# Patient Record
Sex: MALE | Employment: OTHER | URBAN - METROPOLITAN AREA
[De-identification: names, ages, dates, MRNs, and addresses within clinical notes are randomized per-mention and may not be internally consistent; named-entity substitution may affect disease eponyms.]

---

## 2020-01-01 ENCOUNTER — PATIENT OUTREACH (OUTPATIENT)
Dept: HEALTH INFORMATION MANAGEMENT | Facility: OTHER | Age: 75
End: 2020-01-01

## 2020-01-01 ENCOUNTER — APPOINTMENT (OUTPATIENT)
Dept: RADIOLOGY | Facility: MEDICAL CENTER | Age: 75
DRG: 246 | End: 2020-01-01
Attending: INTERNAL MEDICINE
Payer: MEDICARE

## 2020-01-01 ENCOUNTER — APPOINTMENT (OUTPATIENT)
Dept: RADIOLOGY | Facility: MEDICAL CENTER | Age: 75
DRG: 246 | End: 2020-01-01
Attending: STUDENT IN AN ORGANIZED HEALTH CARE EDUCATION/TRAINING PROGRAM
Payer: MEDICARE

## 2020-01-01 ENCOUNTER — APPOINTMENT (OUTPATIENT)
Dept: CARDIOLOGY | Facility: MEDICAL CENTER | Age: 75
DRG: 246 | End: 2020-01-01
Attending: INTERNAL MEDICINE
Payer: MEDICARE

## 2020-01-01 ENCOUNTER — APPOINTMENT (OUTPATIENT)
Dept: RADIOLOGY | Facility: MEDICAL CENTER | Age: 75
DRG: 246 | End: 2020-01-01
Attending: EMERGENCY MEDICINE
Payer: MEDICARE

## 2020-01-01 ENCOUNTER — APPOINTMENT (OUTPATIENT)
Dept: CARDIOLOGY | Facility: MEDICAL CENTER | Age: 75
DRG: 246 | End: 2020-01-01
Attending: EMERGENCY MEDICINE
Payer: MEDICARE

## 2020-01-01 ENCOUNTER — HOSPITAL ENCOUNTER (INPATIENT)
Facility: MEDICAL CENTER | Age: 75
LOS: 11 days | DRG: 246 | End: 2020-08-13
Attending: EMERGENCY MEDICINE | Admitting: INTERNAL MEDICINE
Payer: MEDICARE

## 2020-01-01 ENCOUNTER — HOSPITAL ENCOUNTER (OUTPATIENT)
Facility: MEDICAL CENTER | Age: 75
End: 2020-08-02
Payer: MEDICARE

## 2020-01-01 VITALS
TEMPERATURE: 99.3 F | RESPIRATION RATE: 35 BRPM | WEIGHT: 161.16 LBS | BODY MASS INDEX: 27.51 KG/M2 | OXYGEN SATURATION: 83 % | SYSTOLIC BLOOD PRESSURE: 74 MMHG | HEIGHT: 64 IN | DIASTOLIC BLOOD PRESSURE: 54 MMHG | HEART RATE: 35 BPM

## 2020-01-01 DIAGNOSIS — I21.3 ST ELEVATION MYOCARDIAL INFARCTION (STEMI), UNSPECIFIED ARTERY (HCC): ICD-10-CM

## 2020-01-01 DIAGNOSIS — R41.82 ALTERED MENTAL STATUS, UNSPECIFIED ALTERED MENTAL STATUS TYPE: ICD-10-CM

## 2020-01-01 DIAGNOSIS — I49.01 VENTRICULAR FIBRILLATION (HCC): ICD-10-CM

## 2020-01-01 DIAGNOSIS — I46.9 CARDIAC ARREST (HCC): ICD-10-CM

## 2020-01-01 LAB
ABO + RH BLD: NORMAL
ABO GROUP BLD: ABNORMAL
ACT BLD: 202 SEC (ref 74–137)
ACT BLD: 241 SEC (ref 74–137)
ACT BLD: 268 SEC (ref 74–137)
ACTION RANGE TRIGGERED IACRT: NO
ACTION RANGE TRIGGERED IACRT: YES
ALBUMIN SERPL BCP-MCNC: 2.3 G/DL (ref 3.2–4.9)
ALBUMIN SERPL BCP-MCNC: 2.6 G/DL (ref 3.2–4.9)
ALBUMIN/GLOB SERPL: 0.7 G/DL
ALBUMIN/GLOB SERPL: 1.1 G/DL
ALP SERPL-CCNC: 64 U/L (ref 30–99)
ALP SERPL-CCNC: 72 U/L (ref 30–99)
ALT SERPL-CCNC: 48 U/L (ref 2–50)
ALT SERPL-CCNC: 55 U/L (ref 2–50)
AMPHET UR QL SCN: NEGATIVE
ANION GAP SERPL CALC-SCNC: 10 MMOL/L (ref 7–16)
ANION GAP SERPL CALC-SCNC: 11 MMOL/L (ref 7–16)
ANION GAP SERPL CALC-SCNC: 13 MMOL/L (ref 7–16)
ANION GAP SERPL CALC-SCNC: 14 MMOL/L (ref 7–16)
ANION GAP SERPL CALC-SCNC: 14 MMOL/L (ref 7–16)
ANION GAP SERPL CALC-SCNC: 15 MMOL/L (ref 7–16)
ANION GAP SERPL CALC-SCNC: 17 MMOL/L (ref 7–16)
ANION GAP SERPL CALC-SCNC: 19 MMOL/L (ref 7–16)
ANION GAP SERPL CALC-SCNC: 6 MMOL/L (ref 7–16)
ANISOCYTOSIS BLD QL SMEAR: ABNORMAL
APPEARANCE FLD: NORMAL
APPEARANCE UR: CLEAR
APTT PPP: 30.5 SEC (ref 24.7–36)
AST SERPL-CCNC: 192 U/L (ref 12–45)
AST SERPL-CCNC: 53 U/L (ref 12–45)
BACTERIA #/AREA URNS HPF: NEGATIVE /HPF
BACTERIA SPEC RESP CULT: NORMAL
BACTERIA SPEC RESP CULT: NORMAL
BARBITURATES UR QL SCN: NEGATIVE
BARCODED ABORH UBTYP: 600
BARCODED ABORH UBTYP: 600
BARCODED PRD CODE UBPRD: ABNORMAL
BARCODED PRD CODE UBPRD: ABNORMAL
BARCODED UNIT NUM UBUNT: ABNORMAL
BARCODED UNIT NUM UBUNT: ABNORMAL
BASE EXCESS BLDA CALC-SCNC: -10 MMOL/L (ref -4–3)
BASE EXCESS BLDA CALC-SCNC: -10 MMOL/L (ref -4–3)
BASE EXCESS BLDA CALC-SCNC: -4 MMOL/L (ref -4–3)
BASE EXCESS BLDA CALC-SCNC: -7 MMOL/L (ref -4–3)
BASE EXCESS BLDA CALC-SCNC: 3 MMOL/L (ref -4–3)
BASE EXCESS BLDA CALC-SCNC: 5 MMOL/L (ref -4–3)
BASE EXCESS BLDA CALC-SCNC: 9 MMOL/L (ref -4–3)
BASE EXCESS BLDV CALC-SCNC: 11 MMOL/L (ref -4–3)
BASE EXCESS BLDV CALC-SCNC: 13 MMOL/L (ref -4–3)
BASE EXCESS BLDV CALC-SCNC: 7 MMOL/L (ref -4–3)
BASOPHILS # BLD AUTO: 0 % (ref 0–1.8)
BASOPHILS # BLD AUTO: 0.1 % (ref 0–1.8)
BASOPHILS # BLD AUTO: 0.2 % (ref 0–1.8)
BASOPHILS # BLD AUTO: 0.2 % (ref 0–1.8)
BASOPHILS # BLD AUTO: 0.3 % (ref 0–1.8)
BASOPHILS # BLD AUTO: 0.4 % (ref 0–1.8)
BASOPHILS # BLD AUTO: 0.4 % (ref 0–1.8)
BASOPHILS # BLD AUTO: 0.5 % (ref 0–1.8)
BASOPHILS # BLD: 0 K/UL (ref 0–0.12)
BASOPHILS # BLD: 0.03 K/UL (ref 0–0.12)
BASOPHILS # BLD: 0.03 K/UL (ref 0–0.12)
BASOPHILS # BLD: 0.04 K/UL (ref 0–0.12)
BASOPHILS # BLD: 0.05 K/UL (ref 0–0.12)
BASOPHILS # BLD: 0.06 K/UL (ref 0–0.12)
BASOPHILS # BLD: 0.08 K/UL (ref 0–0.12)
BASOPHILS # BLD: 0.08 K/UL (ref 0–0.12)
BASOPHILS # BLD: 0.09 K/UL (ref 0–0.12)
BASOPHILS # BLD: 0.11 K/UL (ref 0–0.12)
BENZODIAZ UR QL SCN: POSITIVE
BILIRUB SERPL-MCNC: 0.6 MG/DL (ref 0.1–1.5)
BILIRUB SERPL-MCNC: 0.6 MG/DL (ref 0.1–1.5)
BILIRUB UR QL STRIP.AUTO: NEGATIVE
BLD GP AB SCN SERPL QL: ABNORMAL
BODY FLD TYPE: NORMAL
BODY TEMPERATURE: ABNORMAL DEGREES
BUN SERPL-MCNC: 24 MG/DL (ref 8–22)
BUN SERPL-MCNC: 25 MG/DL (ref 8–22)
BUN SERPL-MCNC: 26 MG/DL (ref 8–22)
BUN SERPL-MCNC: 29 MG/DL (ref 8–22)
BUN SERPL-MCNC: 29 MG/DL (ref 8–22)
BUN SERPL-MCNC: 32 MG/DL (ref 8–22)
BUN SERPL-MCNC: 38 MG/DL (ref 8–22)
BUN SERPL-MCNC: 40 MG/DL (ref 8–22)
BUN SERPL-MCNC: 41 MG/DL (ref 8–22)
BUN SERPL-MCNC: 47 MG/DL (ref 8–22)
BUN SERPL-MCNC: 48 MG/DL (ref 8–22)
BUN SERPL-MCNC: 50 MG/DL (ref 8–22)
BUN SERPL-MCNC: 52 MG/DL (ref 8–22)
BZE UR QL SCN: NEGATIVE
CA-I BLD ISE-SCNC: 1.14 MMOL/L (ref 1.1–1.3)
CA-I BLD ISE-SCNC: 1.17 MMOL/L (ref 1.1–1.3)
CA-I SERPL-SCNC: 1.1 MMOL/L (ref 1.1–1.3)
CA-I SERPL-SCNC: 1.1 MMOL/L (ref 1.1–1.3)
CALCIUM SERPL-MCNC: 6.9 MG/DL (ref 8.5–10.5)
CALCIUM SERPL-MCNC: 7.1 MG/DL (ref 8.5–10.5)
CALCIUM SERPL-MCNC: 7.5 MG/DL (ref 8.5–10.5)
CALCIUM SERPL-MCNC: 7.7 MG/DL (ref 8.5–10.5)
CALCIUM SERPL-MCNC: 7.8 MG/DL (ref 8.5–10.5)
CALCIUM SERPL-MCNC: 7.8 MG/DL (ref 8.5–10.5)
CALCIUM SERPL-MCNC: 8.1 MG/DL (ref 8.5–10.5)
CALCIUM SERPL-MCNC: 8.2 MG/DL (ref 8.5–10.5)
CALCIUM SERPL-MCNC: 8.2 MG/DL (ref 8.5–10.5)
CALCIUM SERPL-MCNC: 8.3 MG/DL (ref 8.5–10.5)
CALCIUM SERPL-MCNC: 8.3 MG/DL (ref 8.5–10.5)
CALCIUM SERPL-MCNC: 8.5 MG/DL (ref 8.5–10.5)
CANNABINOIDS UR QL SCN: NEGATIVE
CFT BLD TEG: 4.7 MIN (ref 5–10)
CFT P HPASE BLD TEG: 4.2 MIN (ref 5–10)
CHLORIDE SERPL-SCNC: 101 MMOL/L (ref 96–112)
CHLORIDE SERPL-SCNC: 102 MMOL/L (ref 96–112)
CHLORIDE SERPL-SCNC: 103 MMOL/L (ref 96–112)
CHLORIDE SERPL-SCNC: 103 MMOL/L (ref 96–112)
CHLORIDE SERPL-SCNC: 104 MMOL/L (ref 96–112)
CHLORIDE SERPL-SCNC: 104 MMOL/L (ref 96–112)
CHLORIDE SERPL-SCNC: 106 MMOL/L (ref 96–112)
CHLORIDE SERPL-SCNC: 106 MMOL/L (ref 96–112)
CHLORIDE SERPL-SCNC: 107 MMOL/L (ref 96–112)
CHLORIDE SERPL-SCNC: 108 MMOL/L (ref 96–112)
CHLORIDE SERPL-SCNC: 113 MMOL/L (ref 96–112)
CHLORIDE SERPL-SCNC: 113 MMOL/L (ref 96–112)
CHLORIDE SERPL-SCNC: 115 MMOL/L (ref 96–112)
CHLORIDE SERPL-SCNC: 116 MMOL/L (ref 96–112)
CHLORIDE SERPL-SCNC: 99 MMOL/L (ref 96–112)
CHOLEST SERPL-MCNC: 91 MG/DL (ref 100–199)
CLOT ANGLE BLD TEG: 65.3 DEGREES (ref 53–72)
CLOT ANGLE P HPASE BLD TEG: 67.6 DEGREES (ref 53–72)
CLOT INIT P HPASE BLD TEG: 1.6 MIN (ref 1–3)
CLOT LYSIS 30M P MA LENFR BLD TEG: 0 % (ref 0–8)
CLOT LYSIS 30M P MA LENFR BLD TEG: 0 % (ref 0–8)
CO2 BLDA-SCNC: 18 MMOL/L (ref 20–33)
CO2 BLDA-SCNC: 20 MMOL/L (ref 20–33)
CO2 BLDA-SCNC: 23 MMOL/L (ref 20–33)
CO2 BLDA-SCNC: 24 MMOL/L (ref 20–33)
CO2 BLDA-SCNC: 28 MMOL/L (ref 20–33)
CO2 BLDA-SCNC: 31 MMOL/L (ref 20–33)
CO2 BLDA-SCNC: 33 MMOL/L (ref 20–33)
CO2 BLDV-SCNC: 35 MMOL/L (ref 20–33)
CO2 BLDV-SCNC: 37 MMOL/L (ref 20–33)
CO2 BLDV-SCNC: 38 MMOL/L (ref 20–33)
CO2 SERPL-SCNC: 17 MMOL/L (ref 20–33)
CO2 SERPL-SCNC: 18 MMOL/L (ref 20–33)
CO2 SERPL-SCNC: 18 MMOL/L (ref 20–33)
CO2 SERPL-SCNC: 22 MMOL/L (ref 20–33)
CO2 SERPL-SCNC: 24 MMOL/L (ref 20–33)
CO2 SERPL-SCNC: 29 MMOL/L (ref 20–33)
CO2 SERPL-SCNC: 30 MMOL/L (ref 20–33)
CO2 SERPL-SCNC: 33 MMOL/L (ref 20–33)
CO2 SERPL-SCNC: 34 MMOL/L (ref 20–33)
CO2 SERPL-SCNC: 34 MMOL/L (ref 20–33)
COLOR FLD: NORMAL
COLOR UR: YELLOW
COMPONENT R 8504R: ABNORMAL
COMPONENT R 8504R: ABNORMAL
CORTIS SERPL-MCNC: 16.4 UG/DL (ref 0–23)
CREAT SERPL-MCNC: 0.91 MG/DL (ref 0.5–1.4)
CREAT SERPL-MCNC: 1 MG/DL (ref 0.5–1.4)
CREAT SERPL-MCNC: 1.02 MG/DL (ref 0.5–1.4)
CREAT SERPL-MCNC: 1.09 MG/DL (ref 0.5–1.4)
CREAT SERPL-MCNC: 1.14 MG/DL (ref 0.5–1.4)
CREAT SERPL-MCNC: 1.14 MG/DL (ref 0.5–1.4)
CREAT SERPL-MCNC: 1.16 MG/DL (ref 0.5–1.4)
CREAT SERPL-MCNC: 1.17 MG/DL (ref 0.5–1.4)
CREAT SERPL-MCNC: 1.18 MG/DL (ref 0.5–1.4)
CREAT SERPL-MCNC: 1.19 MG/DL (ref 0.5–1.4)
CREAT SERPL-MCNC: 1.2 MG/DL (ref 0.5–1.4)
CREAT SERPL-MCNC: 1.21 MG/DL (ref 0.5–1.4)
CREAT SERPL-MCNC: 1.23 MG/DL (ref 0.5–1.4)
CREAT SERPL-MCNC: 1.26 MG/DL (ref 0.5–1.4)
CREAT SERPL-MCNC: 1.35 MG/DL (ref 0.5–1.4)
CRP SERPL HS-MCNC: 16.17 MG/DL (ref 0–0.75)
CRP SERPL HS-MCNC: 6.9 MG/DL (ref 0–0.75)
CRP SERPL HS-MCNC: 8.71 MG/DL (ref 0–0.75)
CSF COMMENTS 1658: NORMAL
CT.EXTRINSIC BLD ROTEM: 1.8 MIN (ref 1–3)
CYTOLOGY REG CYTOL: NORMAL
EKG IMPRESSION: NORMAL
EOSINOPHIL # BLD AUTO: 0 K/UL (ref 0–0.51)
EOSINOPHIL # BLD AUTO: 0.01 K/UL (ref 0–0.51)
EOSINOPHIL # BLD AUTO: 0.01 K/UL (ref 0–0.51)
EOSINOPHIL # BLD AUTO: 0.02 K/UL (ref 0–0.51)
EOSINOPHIL # BLD AUTO: 0.04 K/UL (ref 0–0.51)
EOSINOPHIL # BLD AUTO: 0.05 K/UL (ref 0–0.51)
EOSINOPHIL # BLD AUTO: 0.05 K/UL (ref 0–0.51)
EOSINOPHIL # BLD AUTO: 0.08 K/UL (ref 0–0.51)
EOSINOPHIL # BLD AUTO: 0.09 K/UL (ref 0–0.51)
EOSINOPHIL # BLD AUTO: 0.09 K/UL (ref 0–0.51)
EOSINOPHIL # BLD AUTO: 0.13 K/UL (ref 0–0.51)
EOSINOPHIL # BLD AUTO: 0.53 K/UL (ref 0–0.51)
EOSINOPHIL NFR BLD: 0 % (ref 0–6.9)
EOSINOPHIL NFR BLD: 0 % (ref 0–6.9)
EOSINOPHIL NFR BLD: 0.1 % (ref 0–6.9)
EOSINOPHIL NFR BLD: 0.1 % (ref 0–6.9)
EOSINOPHIL NFR BLD: 0.2 % (ref 0–6.9)
EOSINOPHIL NFR BLD: 0.3 % (ref 0–6.9)
EOSINOPHIL NFR BLD: 0.3 % (ref 0–6.9)
EOSINOPHIL NFR BLD: 0.4 % (ref 0–6.9)
EOSINOPHIL NFR BLD: 0.6 % (ref 0–6.9)
EOSINOPHIL NFR BLD: 0.7 % (ref 0–6.9)
EOSINOPHIL NFR BLD: 1.1 % (ref 0–6.9)
EOSINOPHIL NFR BLD: 1.7 % (ref 0–6.9)
EOSINOPHIL NFR FLD: 1 %
EPI CELLS #/AREA URNS HPF: NEGATIVE /HPF
ERYTHROCYTE [DISTWIDTH] IN BLOOD BY AUTOMATED COUNT: 59.7 FL (ref 35.9–50)
ERYTHROCYTE [DISTWIDTH] IN BLOOD BY AUTOMATED COUNT: 60.1 FL (ref 35.9–50)
ERYTHROCYTE [DISTWIDTH] IN BLOOD BY AUTOMATED COUNT: 60.1 FL (ref 35.9–50)
ERYTHROCYTE [DISTWIDTH] IN BLOOD BY AUTOMATED COUNT: 60.3 FL (ref 35.9–50)
ERYTHROCYTE [DISTWIDTH] IN BLOOD BY AUTOMATED COUNT: 61.2 FL (ref 35.9–50)
ERYTHROCYTE [DISTWIDTH] IN BLOOD BY AUTOMATED COUNT: 61.4 FL (ref 35.9–50)
ERYTHROCYTE [DISTWIDTH] IN BLOOD BY AUTOMATED COUNT: 62.4 FL (ref 35.9–50)
ERYTHROCYTE [DISTWIDTH] IN BLOOD BY AUTOMATED COUNT: 62.4 FL (ref 35.9–50)
ERYTHROCYTE [DISTWIDTH] IN BLOOD BY AUTOMATED COUNT: 62.5 FL (ref 35.9–50)
ERYTHROCYTE [DISTWIDTH] IN BLOOD BY AUTOMATED COUNT: 64 FL (ref 35.9–50)
FUNGUS SPEC FUNGUS STN: NORMAL
FUNGUS SPEC FUNGUS STN: NORMAL
GLOBULIN SER CALC-MCNC: 2.4 G/DL (ref 1.9–3.5)
GLOBULIN SER CALC-MCNC: 3.3 G/DL (ref 1.9–3.5)
GLUCOSE BLD-MCNC: 110 MG/DL (ref 65–99)
GLUCOSE BLD-MCNC: 139 MG/DL (ref 65–99)
GLUCOSE BLD-MCNC: 142 MG/DL (ref 65–99)
GLUCOSE BLD-MCNC: 154 MG/DL (ref 65–99)
GLUCOSE BLD-MCNC: 199 MG/DL (ref 65–99)
GLUCOSE SERPL-MCNC: 107 MG/DL (ref 65–99)
GLUCOSE SERPL-MCNC: 120 MG/DL (ref 65–99)
GLUCOSE SERPL-MCNC: 124 MG/DL (ref 65–99)
GLUCOSE SERPL-MCNC: 125 MG/DL (ref 65–99)
GLUCOSE SERPL-MCNC: 126 MG/DL (ref 65–99)
GLUCOSE SERPL-MCNC: 131 MG/DL (ref 65–99)
GLUCOSE SERPL-MCNC: 136 MG/DL (ref 65–99)
GLUCOSE SERPL-MCNC: 141 MG/DL (ref 65–99)
GLUCOSE SERPL-MCNC: 148 MG/DL (ref 65–99)
GLUCOSE SERPL-MCNC: 152 MG/DL (ref 65–99)
GLUCOSE SERPL-MCNC: 160 MG/DL (ref 65–99)
GLUCOSE SERPL-MCNC: 172 MG/DL (ref 65–99)
GLUCOSE SERPL-MCNC: 188 MG/DL (ref 65–99)
GLUCOSE SERPL-MCNC: 202 MG/DL (ref 65–99)
GLUCOSE SERPL-MCNC: 209 MG/DL (ref 65–99)
GLUCOSE UR STRIP.AUTO-MCNC: NEGATIVE MG/DL
GRAM STN SPEC: NORMAL
HCO3 BLDA-SCNC: 17 MMOL/L (ref 17–25)
HCO3 BLDA-SCNC: 18.8 MMOL/L (ref 17–25)
HCO3 BLDA-SCNC: 21.5 MMOL/L (ref 17–25)
HCO3 BLDA-SCNC: 21.9 MMOL/L (ref 17–25)
HCO3 BLDA-SCNC: 26.7 MMOL/L (ref 17–25)
HCO3 BLDA-SCNC: 29.6 MMOL/L (ref 17–25)
HCO3 BLDA-SCNC: 32 MMOL/L (ref 17–25)
HCO3 BLDV-SCNC: 32.9 MMOL/L (ref 24–28)
HCO3 BLDV-SCNC: 35.7 MMOL/L (ref 24–28)
HCO3 BLDV-SCNC: 37.1 MMOL/L (ref 24–28)
HCT VFR BLD AUTO: 24.2 % (ref 42–52)
HCT VFR BLD AUTO: 29.8 % (ref 42–52)
HCT VFR BLD AUTO: 30 % (ref 42–52)
HCT VFR BLD AUTO: 30.1 % (ref 42–52)
HCT VFR BLD AUTO: 31.4 % (ref 42–52)
HCT VFR BLD AUTO: 31.9 % (ref 42–52)
HCT VFR BLD AUTO: 32.3 % (ref 42–52)
HCT VFR BLD AUTO: 33 % (ref 42–52)
HCT VFR BLD AUTO: 33.4 % (ref 42–52)
HCT VFR BLD AUTO: 37.1 % (ref 42–52)
HCT VFR BLD AUTO: 37.1 % (ref 42–52)
HCT VFR BLD AUTO: 37.8 % (ref 42–52)
HCT VFR BLD CALC: 31 % (ref 42–52)
HCT VFR BLD CALC: 33 % (ref 42–52)
HDLC SERPL-MCNC: 51 MG/DL
HGB BLD-MCNC: 10 G/DL (ref 14–18)
HGB BLD-MCNC: 10 G/DL (ref 14–18)
HGB BLD-MCNC: 10.5 G/DL (ref 14–18)
HGB BLD-MCNC: 10.9 G/DL (ref 14–18)
HGB BLD-MCNC: 11.2 G/DL (ref 14–18)
HGB BLD-MCNC: 11.9 G/DL (ref 14–18)
HGB BLD-MCNC: 12.4 G/DL (ref 14–18)
HGB BLD-MCNC: 12.6 G/DL (ref 14–18)
HGB BLD-MCNC: 12.6 G/DL (ref 14–18)
HGB BLD-MCNC: 12.8 G/DL (ref 14–18)
HGB BLD-MCNC: 7.8 G/DL (ref 14–18)
HGB BLD-MCNC: 9.6 G/DL (ref 14–18)
HGB BLD-MCNC: 9.7 G/DL (ref 14–18)
HGB BLD-MCNC: 9.7 G/DL (ref 14–18)
HOROWITZ INDEX BLDA+IHG-RTO: 150 MM[HG]
HOROWITZ INDEX BLDA+IHG-RTO: 153 MM[HG]
HOROWITZ INDEX BLDA+IHG-RTO: 180 MM[HG]
HOROWITZ INDEX BLDA+IHG-RTO: 200 MM[HG]
HOROWITZ INDEX BLDA+IHG-RTO: 245 MM[HG]
HOROWITZ INDEX BLDA+IHG-RTO: 277 MM[HG]
HOROWITZ INDEX BLDA+IHG-RTO: 306 MM[HG]
HOROWITZ INDEX BLDV+IHG-RTO: 113 MM[HG]
HOROWITZ INDEX BLDV+IHG-RTO: 130 MM[HG]
HOROWITZ INDEX BLDV+IHG-RTO: 90 MM[HG]
HYALINE CASTS #/AREA URNS LPF: ABNORMAL /LPF
HYPOCHROMIA BLD QL SMEAR: ABNORMAL
IMM GRANULOCYTES # BLD AUTO: 0.05 K/UL (ref 0–0.11)
IMM GRANULOCYTES # BLD AUTO: 0.06 K/UL (ref 0–0.11)
IMM GRANULOCYTES # BLD AUTO: 0.06 K/UL (ref 0–0.11)
IMM GRANULOCYTES # BLD AUTO: 0.07 K/UL (ref 0–0.11)
IMM GRANULOCYTES # BLD AUTO: 0.11 K/UL (ref 0–0.11)
IMM GRANULOCYTES # BLD AUTO: 0.16 K/UL (ref 0–0.11)
IMM GRANULOCYTES # BLD AUTO: 0.2 K/UL (ref 0–0.11)
IMM GRANULOCYTES # BLD AUTO: 0.21 K/UL (ref 0–0.11)
IMM GRANULOCYTES # BLD AUTO: 0.23 K/UL (ref 0–0.11)
IMM GRANULOCYTES # BLD AUTO: 0.32 K/UL (ref 0–0.11)
IMM GRANULOCYTES # BLD AUTO: 0.38 K/UL (ref 0–0.11)
IMM GRANULOCYTES NFR BLD AUTO: 0.4 % (ref 0–0.9)
IMM GRANULOCYTES NFR BLD AUTO: 0.5 % (ref 0–0.9)
IMM GRANULOCYTES NFR BLD AUTO: 0.7 % (ref 0–0.9)
IMM GRANULOCYTES NFR BLD AUTO: 0.7 % (ref 0–0.9)
IMM GRANULOCYTES NFR BLD AUTO: 1 % (ref 0–0.9)
IMM GRANULOCYTES NFR BLD AUTO: 1.2 % (ref 0–0.9)
IMM GRANULOCYTES NFR BLD AUTO: 1.3 % (ref 0–0.9)
INST. QUALIFIED PATIENT IIQPT: YES
KETONES UR STRIP.AUTO-MCNC: NEGATIVE MG/DL
LDLC SERPL CALC-MCNC: 32 MG/DL
LEUKOCYTE ESTERASE UR QL STRIP.AUTO: ABNORMAL
LV EJECT FRACT  99904: 35
LV EJECT FRACT MOD 2C 99903: 21.47
LV EJECT FRACT MOD 4C 99902: 15.53
LV EJECT FRACT MOD BP 99901: 19.78
LYMPHOCYTES # BLD AUTO: 0.11 K/UL (ref 1–4.8)
LYMPHOCYTES # BLD AUTO: 0.35 K/UL (ref 1–4.8)
LYMPHOCYTES # BLD AUTO: 0.44 K/UL (ref 1–4.8)
LYMPHOCYTES # BLD AUTO: 0.47 K/UL (ref 1–4.8)
LYMPHOCYTES # BLD AUTO: 0.7 K/UL (ref 1–4.8)
LYMPHOCYTES # BLD AUTO: 0.78 K/UL (ref 1–4.8)
LYMPHOCYTES # BLD AUTO: 0.95 K/UL (ref 1–4.8)
LYMPHOCYTES # BLD AUTO: 0.95 K/UL (ref 1–4.8)
LYMPHOCYTES # BLD AUTO: 1.13 K/UL (ref 1–4.8)
LYMPHOCYTES # BLD AUTO: 1.42 K/UL (ref 1–4.8)
LYMPHOCYTES # BLD AUTO: 1.5 K/UL (ref 1–4.8)
LYMPHOCYTES # BLD AUTO: 2.47 K/UL (ref 1–4.8)
LYMPHOCYTES NFR BLD: 0.9 % (ref 22–41)
LYMPHOCYTES NFR BLD: 11.6 % (ref 22–41)
LYMPHOCYTES NFR BLD: 2.3 % (ref 22–41)
LYMPHOCYTES NFR BLD: 2.9 % (ref 22–41)
LYMPHOCYTES NFR BLD: 3.7 % (ref 22–41)
LYMPHOCYTES NFR BLD: 4 % (ref 22–41)
LYMPHOCYTES NFR BLD: 4.7 % (ref 22–41)
LYMPHOCYTES NFR BLD: 4.8 % (ref 22–41)
LYMPHOCYTES NFR BLD: 4.9 % (ref 22–41)
LYMPHOCYTES NFR BLD: 5 % (ref 22–41)
LYMPHOCYTES NFR BLD: 5.2 % (ref 22–41)
LYMPHOCYTES NFR BLD: 5.6 % (ref 22–41)
LYMPHOCYTES NFR FLD: 6 %
MACROCYTES BLD QL SMEAR: ABNORMAL
MAGNESIUM SERPL-MCNC: 1.9 MG/DL (ref 1.5–2.5)
MAGNESIUM SERPL-MCNC: 2 MG/DL (ref 1.5–2.5)
MAGNESIUM SERPL-MCNC: 2 MG/DL (ref 1.5–2.5)
MAGNESIUM SERPL-MCNC: 2.1 MG/DL (ref 1.5–2.5)
MAGNESIUM SERPL-MCNC: 2.5 MG/DL (ref 1.5–2.5)
MAGNESIUM SERPL-MCNC: 2.5 MG/DL (ref 1.5–2.5)
MANUAL DIFF BLD: NORMAL
MCF BLD TEG: 61.1 MM (ref 50–70)
MCF P HPASE BLD TEG: 67.6 MM (ref 50–70)
MCH RBC QN AUTO: 30.8 PG (ref 27–33)
MCH RBC QN AUTO: 30.9 PG (ref 27–33)
MCH RBC QN AUTO: 30.9 PG (ref 27–33)
MCH RBC QN AUTO: 31 PG (ref 27–33)
MCH RBC QN AUTO: 31.1 PG (ref 27–33)
MCH RBC QN AUTO: 31.1 PG (ref 27–33)
MCH RBC QN AUTO: 31.3 PG (ref 27–33)
MCH RBC QN AUTO: 31.5 PG (ref 27–33)
MCH RBC QN AUTO: 31.6 PG (ref 27–33)
MCH RBC QN AUTO: 31.7 PG (ref 27–33)
MCH RBC QN AUTO: 32.6 PG (ref 27–33)
MCH RBC QN AUTO: 33.1 PG (ref 27–33)
MCHC RBC AUTO-ENTMCNC: 31.3 G/DL (ref 33.7–35.3)
MCHC RBC AUTO-ENTMCNC: 31.8 G/DL (ref 33.7–35.3)
MCHC RBC AUTO-ENTMCNC: 32.1 G/DL (ref 33.7–35.3)
MCHC RBC AUTO-ENTMCNC: 32.2 G/DL (ref 33.7–35.3)
MCHC RBC AUTO-ENTMCNC: 32.2 G/DL (ref 33.7–35.3)
MCHC RBC AUTO-ENTMCNC: 32.3 G/DL (ref 33.7–35.3)
MCHC RBC AUTO-ENTMCNC: 32.4 G/DL (ref 33.7–35.3)
MCHC RBC AUTO-ENTMCNC: 32.5 G/DL (ref 33.7–35.3)
MCHC RBC AUTO-ENTMCNC: 32.6 G/DL (ref 33.7–35.3)
MCHC RBC AUTO-ENTMCNC: 32.8 G/DL (ref 33.7–35.3)
MCHC RBC AUTO-ENTMCNC: 33.4 G/DL (ref 33.7–35.3)
MCHC RBC AUTO-ENTMCNC: 34.5 G/DL (ref 33.7–35.3)
MCV RBC AUTO: 100.8 FL (ref 81.4–97.8)
MCV RBC AUTO: 103.3 FL (ref 81.4–97.8)
MCV RBC AUTO: 89.8 FL (ref 81.4–97.8)
MCV RBC AUTO: 93.8 FL (ref 81.4–97.8)
MCV RBC AUTO: 94.3 FL (ref 81.4–97.8)
MCV RBC AUTO: 95.6 FL (ref 81.4–97.8)
MCV RBC AUTO: 96.2 FL (ref 81.4–97.8)
MCV RBC AUTO: 96.8 FL (ref 81.4–97.8)
MCV RBC AUTO: 97.1 FL (ref 81.4–97.8)
MCV RBC AUTO: 97.1 FL (ref 81.4–97.8)
MCV RBC AUTO: 98.3 FL (ref 81.4–97.8)
MCV RBC AUTO: 98.5 FL (ref 81.4–97.8)
METHADONE UR QL SCN: NEGATIVE
MICRO URNS: ABNORMAL
MONOCYTES # BLD AUTO: 0.43 K/UL (ref 0–0.85)
MONOCYTES # BLD AUTO: 0.98 K/UL (ref 0–0.85)
MONOCYTES # BLD AUTO: 1.06 K/UL (ref 0–0.85)
MONOCYTES # BLD AUTO: 1.08 K/UL (ref 0–0.85)
MONOCYTES # BLD AUTO: 1.08 K/UL (ref 0–0.85)
MONOCYTES # BLD AUTO: 1.11 K/UL (ref 0–0.85)
MONOCYTES # BLD AUTO: 1.34 K/UL (ref 0–0.85)
MONOCYTES # BLD AUTO: 1.37 K/UL (ref 0–0.85)
MONOCYTES # BLD AUTO: 1.47 K/UL (ref 0–0.85)
MONOCYTES # BLD AUTO: 1.54 K/UL (ref 0–0.85)
MONOCYTES # BLD AUTO: 1.68 K/UL (ref 0–0.85)
MONOCYTES # BLD AUTO: 1.87 K/UL (ref 0–0.85)
MONOCYTES NFR BLD AUTO: 10.2 % (ref 0–13.4)
MONOCYTES NFR BLD AUTO: 12.5 % (ref 0–13.4)
MONOCYTES NFR BLD AUTO: 3.5 % (ref 0–13.4)
MONOCYTES NFR BLD AUTO: 3.5 % (ref 0–13.4)
MONOCYTES NFR BLD AUTO: 5.1 % (ref 0–13.4)
MONOCYTES NFR BLD AUTO: 5.4 % (ref 0–13.4)
MONOCYTES NFR BLD AUTO: 5.5 % (ref 0–13.4)
MONOCYTES NFR BLD AUTO: 6.4 % (ref 0–13.4)
MONOCYTES NFR BLD AUTO: 6.5 % (ref 0–13.4)
MONOCYTES NFR BLD AUTO: 7.3 % (ref 0–13.4)
MONOCYTES NFR BLD AUTO: 8.1 % (ref 0–13.4)
MONOCYTES NFR BLD AUTO: 9.1 % (ref 0–13.4)
MONONUC CELLS NFR FLD: 5 %
MORPHOLOGY BLD-IMP: NORMAL
MRSA DNA SPEC QL NAA+PROBE: NORMAL
MYELOCYTES NFR BLD MANUAL: 0.9 %
NEUTROPHILS # BLD AUTO: 10.08 K/UL (ref 1.82–7.42)
NEUTROPHILS # BLD AUTO: 11.59 K/UL (ref 1.82–7.42)
NEUTROPHILS # BLD AUTO: 11.74 K/UL (ref 1.82–7.42)
NEUTROPHILS # BLD AUTO: 12.47 K/UL (ref 1.82–7.42)
NEUTROPHILS # BLD AUTO: 13.07 K/UL (ref 1.82–7.42)
NEUTROPHILS # BLD AUTO: 14.23 K/UL (ref 1.82–7.42)
NEUTROPHILS # BLD AUTO: 14.74 K/UL (ref 1.82–7.42)
NEUTROPHILS # BLD AUTO: 17.23 K/UL (ref 1.82–7.42)
NEUTROPHILS # BLD AUTO: 17.51 K/UL (ref 1.82–7.42)
NEUTROPHILS # BLD AUTO: 25.46 K/UL (ref 1.82–7.42)
NEUTROPHILS # BLD AUTO: 26.76 K/UL (ref 1.82–7.42)
NEUTROPHILS # BLD AUTO: 27.24 K/UL (ref 1.82–7.42)
NEUTROPHILS NFR BLD: 80.9 % (ref 44–72)
NEUTROPHILS NFR BLD: 83 % (ref 44–72)
NEUTROPHILS NFR BLD: 85.1 % (ref 44–72)
NEUTROPHILS NFR BLD: 85.9 % (ref 44–72)
NEUTROPHILS NFR BLD: 86.2 % (ref 44–72)
NEUTROPHILS NFR BLD: 86.3 % (ref 44–72)
NEUTROPHILS NFR BLD: 86.7 % (ref 44–72)
NEUTROPHILS NFR BLD: 87.9 % (ref 44–72)
NEUTROPHILS NFR BLD: 88.3 % (ref 44–72)
NEUTROPHILS NFR BLD: 88.8 % (ref 44–72)
NEUTROPHILS NFR BLD: 89.2 % (ref 44–72)
NEUTROPHILS NFR BLD: 92.1 % (ref 44–72)
NEUTROPHILS NFR FLD: 88 %
NEUTS BAND NFR BLD MANUAL: 2.6 % (ref 0–10)
NITRITE UR QL STRIP.AUTO: NEGATIVE
NRBC # BLD AUTO: 0 K/UL
NRBC # BLD AUTO: 0.02 K/UL
NRBC # BLD AUTO: 0.03 K/UL
NRBC # BLD AUTO: 0.03 K/UL
NRBC BLD-RTO: 0 /100 WBC
NRBC BLD-RTO: 0.1 /100 WBC
NRBC BLD-RTO: 0.2 /100 WBC
NRBC BLD-RTO: 0.2 /100 WBC
NT-PROBNP SERPL IA-MCNC: 2024 PG/ML (ref 0–125)
O2/TOTAL GAS SETTING VFR VENT: 100 %
O2/TOTAL GAS SETTING VFR VENT: 100 %
O2/TOTAL GAS SETTING VFR VENT: 30 %
O2/TOTAL GAS SETTING VFR VENT: 40 %
O2/TOTAL GAS SETTING VFR VENT: 60 %
OPIATES UR QL SCN: POSITIVE
OVALOCYTES BLD QL SMEAR: NORMAL
OXYCODONE UR QL SCN: NEGATIVE
PA AA BLD-ACNC: 91.3 %
PA ADP BLD-ACNC: ABNORMAL %
PCO2 BLDA: 37.9 MMHG (ref 26–37)
PCO2 BLDA: 38.4 MMHG (ref 26–37)
PCO2 BLDA: 38.8 MMHG (ref 26–37)
PCO2 BLDA: 41.2 MMHG (ref 26–37)
PCO2 BLDA: 42 MMHG (ref 26–37)
PCO2 BLDA: 51.3 MMHG (ref 26–37)
PCO2 BLDA: 57.9 MMHG (ref 26–37)
PCO2 BLDV: 44.7 MMHG (ref 41–51)
PCO2 BLDV: 46.7 MMHG (ref 41–51)
PCO2 BLDV: 52.2 MMHG (ref 41–51)
PCO2 TEMP ADJ BLDA: 31.9 MMHG (ref 26–37)
PCO2 TEMP ADJ BLDA: 35.6 MMHG (ref 26–37)
PCO2 TEMP ADJ BLDA: 38.5 MMHG (ref 26–37)
PCO2 TEMP ADJ BLDA: 39.4 MMHG (ref 26–37)
PCO2 TEMP ADJ BLDA: 42 MMHG (ref 26–37)
PCO2 TEMP ADJ BLDA: 45 MMHG (ref 26–37)
PCO2 TEMP ADJ BLDA: 57.9 MMHG (ref 26–37)
PCO2 TEMP ADJ BLDV: 44.1 MMHG (ref 41–51)
PCO2 TEMP ADJ BLDV: 45.9 MMHG (ref 41–51)
PCO2 TEMP ADJ BLDV: 54.8 MMHG (ref 41–51)
PCP UR QL SCN: NEGATIVE
PH BLDA: 7.17 [PH] (ref 7.4–7.5)
PH BLDA: 7.18 [PH] (ref 7.4–7.5)
PH BLDA: 7.21 [PH] (ref 7.4–7.5)
PH BLDA: 7.35 [PH] (ref 7.4–7.5)
PH BLDA: 7.46 [PH] (ref 7.4–7.5)
PH BLDA: 7.46 [PH] (ref 7.4–7.5)
PH BLDA: 7.53 [PH] (ref 7.4–7.5)
PH BLDV: 7.41 [PH] (ref 7.31–7.45)
PH BLDV: 7.49 [PH] (ref 7.31–7.45)
PH BLDV: 7.53 [PH] (ref 7.31–7.45)
PH TEMP ADJ BLDA: 7.18 [PH] (ref 7.4–7.5)
PH TEMP ADJ BLDA: 7.21 [PH] (ref 7.4–7.5)
PH TEMP ADJ BLDA: 7.21 [PH] (ref 7.4–7.5)
PH TEMP ADJ BLDA: 7.42 [PH] (ref 7.4–7.5)
PH TEMP ADJ BLDA: 7.48 [PH] (ref 7.4–7.5)
PH TEMP ADJ BLDA: 7.48 [PH] (ref 7.4–7.5)
PH TEMP ADJ BLDA: 7.53 [PH] (ref 7.4–7.5)
PH TEMP ADJ BLDV: 7.39 [PH] (ref 7.31–7.45)
PH TEMP ADJ BLDV: 7.5 [PH] (ref 7.31–7.45)
PH TEMP ADJ BLDV: 7.53 [PH] (ref 7.31–7.45)
PH UR STRIP.AUTO: 8.5 [PH] (ref 5–8)
PHOSPHATE SERPL-MCNC: 1.4 MG/DL (ref 2.5–4.5)
PHOSPHATE SERPL-MCNC: 2.1 MG/DL (ref 2.5–4.5)
PHOSPHATE SERPL-MCNC: 2.3 MG/DL (ref 2.5–4.5)
PHOSPHATE SERPL-MCNC: 3.2 MG/DL (ref 2.5–4.5)
PHOSPHATE SERPL-MCNC: 6 MG/DL (ref 2.5–4.5)
PHOSPHATE SERPL-MCNC: 6.6 MG/DL (ref 2.5–4.5)
PLATELET # BLD AUTO: 150 K/UL (ref 164–446)
PLATELET # BLD AUTO: 155 K/UL (ref 164–446)
PLATELET # BLD AUTO: 168 K/UL (ref 164–446)
PLATELET # BLD AUTO: 205 K/UL (ref 164–446)
PLATELET # BLD AUTO: 206 K/UL (ref 164–446)
PLATELET # BLD AUTO: 217 K/UL (ref 164–446)
PLATELET # BLD AUTO: 237 K/UL (ref 164–446)
PLATELET # BLD AUTO: 278 K/UL (ref 164–446)
PLATELET # BLD AUTO: 297 K/UL (ref 164–446)
PLATELET # BLD AUTO: 359 K/UL (ref 164–446)
PLATELET # BLD AUTO: 366 K/UL (ref 164–446)
PLATELET # BLD AUTO: 370 K/UL (ref 164–446)
PLATELET BLD QL SMEAR: NORMAL
PMV BLD AUTO: 10 FL (ref 9–12.9)
PMV BLD AUTO: 10.4 FL (ref 9–12.9)
PMV BLD AUTO: 10.5 FL (ref 9–12.9)
PMV BLD AUTO: 10.6 FL (ref 9–12.9)
PMV BLD AUTO: 10.7 FL (ref 9–12.9)
PMV BLD AUTO: 10.7 FL (ref 9–12.9)
PMV BLD AUTO: 11 FL (ref 9–12.9)
PMV BLD AUTO: 9.3 FL (ref 9–12.9)
PMV BLD AUTO: 9.3 FL (ref 9–12.9)
PMV BLD AUTO: 9.5 FL (ref 9–12.9)
PO2 BLDA: 108 MMHG (ref 64–87)
PO2 BLDA: 277 MMHG (ref 64–87)
PO2 BLDA: 306 MMHG (ref 64–87)
PO2 BLDA: 60 MMHG (ref 64–87)
PO2 BLDA: 60 MMHG (ref 64–87)
PO2 BLDA: 61 MMHG (ref 64–87)
PO2 BLDA: 98 MMHG (ref 64–87)
PO2 BLDV: 27 MMHG (ref 25–40)
PO2 BLDV: 39 MMHG (ref 25–40)
PO2 BLDV: 45 MMHG (ref 25–40)
PO2 TEMP ADJ BLDA: 263 MMHG (ref 64–87)
PO2 TEMP ADJ BLDA: 285 MMHG (ref 64–87)
PO2 TEMP ADJ BLDA: 56 MMHG (ref 64–87)
PO2 TEMP ADJ BLDA: 60 MMHG (ref 64–87)
PO2 TEMP ADJ BLDA: 61 MMHG (ref 64–87)
PO2 TEMP ADJ BLDA: 98 MMHG (ref 64–87)
PO2 TEMP ADJ BLDA: 99 MMHG (ref 64–87)
PO2 TEMP ADJ BLDV: 26 MMHG (ref 25–40)
PO2 TEMP ADJ BLDV: 38 MMHG (ref 25–40)
PO2 TEMP ADJ BLDV: 48 MMHG (ref 25–40)
POIKILOCYTOSIS BLD QL SMEAR: NORMAL
POTASSIUM BLD-SCNC: 4 MMOL/L (ref 3.6–5.5)
POTASSIUM BLD-SCNC: 5.1 MMOL/L (ref 3.6–5.5)
POTASSIUM SERPL-SCNC: 2.9 MMOL/L (ref 3.6–5.5)
POTASSIUM SERPL-SCNC: 3.1 MMOL/L (ref 3.6–5.5)
POTASSIUM SERPL-SCNC: 3.1 MMOL/L (ref 3.6–5.5)
POTASSIUM SERPL-SCNC: 3.2 MMOL/L (ref 3.6–5.5)
POTASSIUM SERPL-SCNC: 3.3 MMOL/L (ref 3.6–5.5)
POTASSIUM SERPL-SCNC: 3.4 MMOL/L (ref 3.6–5.5)
POTASSIUM SERPL-SCNC: 3.5 MMOL/L (ref 3.6–5.5)
POTASSIUM SERPL-SCNC: 3.7 MMOL/L (ref 3.6–5.5)
POTASSIUM SERPL-SCNC: 3.7 MMOL/L (ref 3.6–5.5)
POTASSIUM SERPL-SCNC: 4 MMOL/L (ref 3.6–5.5)
POTASSIUM SERPL-SCNC: 4.6 MMOL/L (ref 3.6–5.5)
POTASSIUM SERPL-SCNC: 5.4 MMOL/L (ref 3.6–5.5)
PREALB SERPL-MCNC: 11.8 MG/DL (ref 18–38)
PREALB SERPL-MCNC: 8.9 MG/DL (ref 18–38)
PROCALCITONIN SERPL-MCNC: 0.35 NG/ML
PRODUCT TYPE UPROD: ABNORMAL
PRODUCT TYPE UPROD: ABNORMAL
PROPOXYPH UR QL SCN: NEGATIVE
PROT SERPL-MCNC: 5 G/DL (ref 6–8.2)
PROT SERPL-MCNC: 5.6 G/DL (ref 6–8.2)
PROT UR QL STRIP: NEGATIVE MG/DL
RBC # BLD AUTO: 2.36 M/UL (ref 4.7–6.1)
RBC # BLD AUTO: 3.03 M/UL (ref 4.7–6.1)
RBC # BLD AUTO: 3.1 M/UL (ref 4.7–6.1)
RBC # BLD AUTO: 3.12 M/UL (ref 4.7–6.1)
RBC # BLD AUTO: 3.24 M/UL (ref 4.7–6.1)
RBC # BLD AUTO: 3.33 M/UL (ref 4.7–6.1)
RBC # BLD AUTO: 3.38 M/UL (ref 4.7–6.1)
RBC # BLD AUTO: 3.4 M/UL (ref 4.7–6.1)
RBC # BLD AUTO: 3.45 M/UL (ref 4.7–6.1)
RBC # BLD AUTO: 3.59 M/UL (ref 4.7–6.1)
RBC # BLD AUTO: 4.03 M/UL (ref 4.7–6.1)
RBC # BLD AUTO: 4.13 M/UL (ref 4.7–6.1)
RBC # URNS HPF: ABNORMAL /HPF
RBC BLD AUTO: PRESENT
RBC UR QL AUTO: NEGATIVE
RH BLD: ABNORMAL
SAO2 % BLDA: 100 % (ref 93–99)
SAO2 % BLDA: 100 % (ref 93–99)
SAO2 % BLDA: 85 % (ref 93–99)
SAO2 % BLDA: 92 % (ref 93–99)
SAO2 % BLDA: 93 % (ref 93–99)
SAO2 % BLDA: 95 % (ref 93–99)
SAO2 % BLDA: 98 % (ref 93–99)
SAO2 % BLDV: 57 %
SAO2 % BLDV: 78 %
SAO2 % BLDV: 80 %
SIGNIFICANT IND 70042: NORMAL
SITE SITE: NORMAL
SODIUM BLD-SCNC: 132 MMOL/L (ref 135–145)
SODIUM BLD-SCNC: 136 MMOL/L (ref 135–145)
SODIUM SERPL-SCNC: 135 MMOL/L (ref 135–145)
SODIUM SERPL-SCNC: 137 MMOL/L (ref 135–145)
SODIUM SERPL-SCNC: 139 MMOL/L (ref 135–145)
SODIUM SERPL-SCNC: 140 MMOL/L (ref 135–145)
SODIUM SERPL-SCNC: 141 MMOL/L (ref 135–145)
SODIUM SERPL-SCNC: 141 MMOL/L (ref 135–145)
SODIUM SERPL-SCNC: 142 MMOL/L (ref 135–145)
SODIUM SERPL-SCNC: 147 MMOL/L (ref 135–145)
SODIUM SERPL-SCNC: 147 MMOL/L (ref 135–145)
SODIUM SERPL-SCNC: 152 MMOL/L (ref 135–145)
SODIUM SERPL-SCNC: 153 MMOL/L (ref 135–145)
SODIUM SERPL-SCNC: 153 MMOL/L (ref 135–145)
SODIUM SERPL-SCNC: 155 MMOL/L (ref 135–145)
SODIUM SERPL-SCNC: 157 MMOL/L (ref 135–145)
SODIUM SERPL-SCNC: 159 MMOL/L (ref 135–145)
SOURCE SOURCE: NORMAL
SP GR UR STRIP.AUTO: 1.02
SPECIMEN DRAWN FROM PATIENT: ABNORMAL
TEG ALGORITHM TGALG: ABNORMAL
TRIGL SERPL-MCNC: 38 MG/DL (ref 0–149)
TROPONIN T SERPL-MCNC: 323 NG/L (ref 6–19)
TROPONIN T SERPL-MCNC: 3931 NG/L (ref 6–19)
TSH SERPL DL<=0.005 MIU/L-ACNC: 4.72 UIU/ML (ref 0.38–5.33)
UNIT STATUS USTAT: ABNORMAL
UNIT STATUS USTAT: ABNORMAL
UROBILINOGEN UR STRIP.AUTO-MCNC: 1 MG/DL
WBC # BLD AUTO: 11.9 K/UL (ref 4.8–10.8)
WBC # BLD AUTO: 12.4 K/UL (ref 4.8–10.8)
WBC # BLD AUTO: 13.5 K/UL (ref 4.8–10.8)
WBC # BLD AUTO: 15 K/UL (ref 4.8–10.8)
WBC # BLD AUTO: 15.1 K/UL (ref 4.8–10.8)
WBC # BLD AUTO: 16.6 K/UL (ref 4.8–10.8)
WBC # BLD AUTO: 17 K/UL (ref 4.8–10.8)
WBC # BLD AUTO: 19.9 K/UL (ref 4.8–10.8)
WBC # BLD AUTO: 21.3 K/UL (ref 4.8–10.8)
WBC # BLD AUTO: 28.7 K/UL (ref 4.8–10.8)
WBC # BLD AUTO: 29.7 K/UL (ref 4.8–10.8)
WBC # BLD AUTO: 30.3 K/UL (ref 4.8–10.8)
WBC # FLD: NORMAL CELLS/UL
WBC #/AREA URNS HPF: ABNORMAL /HPF

## 2020-01-01 PROCEDURE — 87641 MR-STAPH DNA AMP PROBE: CPT

## 2020-01-01 PROCEDURE — 86901 BLOOD TYPING SEROLOGIC RH(D): CPT

## 2020-01-01 PROCEDURE — 96375 TX/PRO/DX INJ NEW DRUG ADDON: CPT

## 2020-01-01 PROCEDURE — 302978 HCHG BRONCHOSCOPY-DIAGNOSTIC

## 2020-01-01 PROCEDURE — 93005 ELECTROCARDIOGRAM TRACING: CPT | Performed by: EMERGENCY MEDICINE

## 2020-01-01 PROCEDURE — 93010 ELECTROCARDIOGRAM REPORT: CPT | Performed by: INTERNAL MEDICINE

## 2020-01-01 PROCEDURE — A9270 NON-COVERED ITEM OR SERVICE: HCPCS | Performed by: INTERNAL MEDICINE

## 2020-01-01 PROCEDURE — 700105 HCHG RX REV CODE 258

## 2020-01-01 PROCEDURE — 700101 HCHG RX REV CODE 250: Performed by: INTERNAL MEDICINE

## 2020-01-01 PROCEDURE — A9270 NON-COVERED ITEM OR SERVICE: HCPCS | Performed by: STUDENT IN AN ORGANIZED HEALTH CARE EDUCATION/TRAINING PROGRAM

## 2020-01-01 PROCEDURE — 99233 SBSQ HOSP IP/OBS HIGH 50: CPT | Performed by: INTERNAL MEDICINE

## 2020-01-01 PROCEDURE — 85014 HEMATOCRIT: CPT

## 2020-01-01 PROCEDURE — 94003 VENT MGMT INPAT SUBQ DAY: CPT

## 2020-01-01 PROCEDURE — 99291 CRITICAL CARE FIRST HOUR: CPT | Performed by: INTERNAL MEDICINE

## 2020-01-01 PROCEDURE — 80048 BASIC METABOLIC PNL TOTAL CA: CPT

## 2020-01-01 PROCEDURE — 84484 ASSAY OF TROPONIN QUANT: CPT

## 2020-01-01 PROCEDURE — 4A023N7 MEASUREMENT OF CARDIAC SAMPLING AND PRESSURE, LEFT HEART, PERCUTANEOUS APPROACH: ICD-10-PCS | Performed by: INTERNAL MEDICINE

## 2020-01-01 PROCEDURE — 700105 HCHG RX REV CODE 258: Performed by: STUDENT IN AN ORGANIZED HEALTH CARE EDUCATION/TRAINING PROGRAM

## 2020-01-01 PROCEDURE — 5A1955Z RESPIRATORY VENTILATION, GREATER THAN 96 CONSECUTIVE HOURS: ICD-10-PCS | Performed by: INTERNAL MEDICINE

## 2020-01-01 PROCEDURE — 88305 TISSUE EXAM BY PATHOLOGIST: CPT

## 2020-01-01 PROCEDURE — 700111 HCHG RX REV CODE 636 W/ 250 OVERRIDE (IP): Performed by: STUDENT IN AN ORGANIZED HEALTH CARE EDUCATION/TRAINING PROGRAM

## 2020-01-01 PROCEDURE — 31645 BRNCHSC W/THER ASPIR 1ST: CPT | Performed by: INTERNAL MEDICINE

## 2020-01-01 PROCEDURE — 700111 HCHG RX REV CODE 636 W/ 250 OVERRIDE (IP): Performed by: INTERNAL MEDICINE

## 2020-01-01 PROCEDURE — 85730 THROMBOPLASTIN TIME PARTIAL: CPT

## 2020-01-01 PROCEDURE — 0B988ZZ DRAINAGE OF LEFT UPPER LOBE BRONCHUS, VIA NATURAL OR ARTIFICIAL OPENING ENDOSCOPIC: ICD-10-PCS | Performed by: INTERNAL MEDICINE

## 2020-01-01 PROCEDURE — 36600 WITHDRAWAL OF ARTERIAL BLOOD: CPT

## 2020-01-01 PROCEDURE — 99232 SBSQ HOSP IP/OBS MODERATE 35: CPT | Performed by: INTERNAL MEDICINE

## 2020-01-01 PROCEDURE — 71045 X-RAY EXAM CHEST 1 VIEW: CPT

## 2020-01-01 PROCEDURE — 70450 CT HEAD/BRAIN W/O DYE: CPT

## 2020-01-01 PROCEDURE — 700105 HCHG RX REV CODE 258: Performed by: INTERNAL MEDICINE

## 2020-01-01 PROCEDURE — 700102 HCHG RX REV CODE 250 W/ 637 OVERRIDE(OP): Performed by: INTERNAL MEDICINE

## 2020-01-01 PROCEDURE — 770022 HCHG ROOM/CARE - ICU (200)

## 2020-01-01 PROCEDURE — 93010 ELECTROCARDIOGRAM REPORT: CPT | Mod: 76 | Performed by: INTERNAL MEDICINE

## 2020-01-01 PROCEDURE — 94640 AIRWAY INHALATION TREATMENT: CPT

## 2020-01-01 PROCEDURE — 99291 CRITICAL CARE FIRST HOUR: CPT | Mod: 25 | Performed by: INTERNAL MEDICINE

## 2020-01-01 PROCEDURE — 92526 ORAL FUNCTION THERAPY: CPT

## 2020-01-01 PROCEDURE — 027035Z DILATION OF CORONARY ARTERY, ONE ARTERY WITH TWO DRUG-ELUTING INTRALUMINAL DEVICES, PERCUTANEOUS APPROACH: ICD-10-PCS | Performed by: INTERNAL MEDICINE

## 2020-01-01 PROCEDURE — 700102 HCHG RX REV CODE 250 W/ 637 OVERRIDE(OP): Performed by: STUDENT IN AN ORGANIZED HEALTH CARE EDUCATION/TRAINING PROGRAM

## 2020-01-01 PROCEDURE — 37799 UNLISTED PX VASCULAR SURGERY: CPT

## 2020-01-01 PROCEDURE — 700117 HCHG RX CONTRAST REV CODE 255: Performed by: EMERGENCY MEDICINE

## 2020-01-01 PROCEDURE — 93922 UPR/L XTREMITY ART 2 LEVELS: CPT

## 2020-01-01 PROCEDURE — 99152 MOD SED SAME PHYS/QHP 5/>YRS: CPT | Performed by: INTERNAL MEDICINE

## 2020-01-01 PROCEDURE — 86140 C-REACTIVE PROTEIN: CPT

## 2020-01-01 PROCEDURE — 85025 COMPLETE CBC W/AUTO DIFF WBC: CPT

## 2020-01-01 PROCEDURE — 84295 ASSAY OF SERUM SODIUM: CPT

## 2020-01-01 PROCEDURE — 700111 HCHG RX REV CODE 636 W/ 250 OVERRIDE (IP)

## 2020-01-01 PROCEDURE — 93005 ELECTROCARDIOGRAM TRACING: CPT | Performed by: INTERNAL MEDICINE

## 2020-01-01 PROCEDURE — C1751 CATH, INF, PER/CENT/MIDLINE: HCPCS

## 2020-01-01 PROCEDURE — 82947 ASSAY GLUCOSE BLOOD QUANT: CPT

## 2020-01-01 PROCEDURE — 85018 HEMOGLOBIN: CPT | Mod: 91

## 2020-01-01 PROCEDURE — 88112 CYTOPATH CELL ENHANCE TECH: CPT

## 2020-01-01 PROCEDURE — 99291 CRITICAL CARE FIRST HOUR: CPT

## 2020-01-01 PROCEDURE — 99231 SBSQ HOSP IP/OBS SF/LOW 25: CPT | Performed by: INTERNAL MEDICINE

## 2020-01-01 PROCEDURE — 31624 DX BRONCHOSCOPE/LAVAGE: CPT | Performed by: INTERNAL MEDICINE

## 2020-01-01 PROCEDURE — 82533 TOTAL CORTISOL: CPT

## 2020-01-01 PROCEDURE — 99292 CRITICAL CARE ADDL 30 MIN: CPT | Mod: 25 | Performed by: INTERNAL MEDICINE

## 2020-01-01 PROCEDURE — 84100 ASSAY OF PHOSPHORUS: CPT

## 2020-01-01 PROCEDURE — 0B9B8ZZ DRAINAGE OF LEFT LOWER LOBE BRONCHUS, VIA NATURAL OR ARTIFICIAL OPENING ENDOSCOPIC: ICD-10-PCS | Performed by: INTERNAL MEDICINE

## 2020-01-01 PROCEDURE — 94690 O2 UPTK REST INDIRECT: CPT

## 2020-01-01 PROCEDURE — 87102 FUNGUS ISOLATION CULTURE: CPT

## 2020-01-01 PROCEDURE — 36620 INSERTION CATHETER ARTERY: CPT | Performed by: INTERNAL MEDICINE

## 2020-01-01 PROCEDURE — 96365 THER/PROPH/DIAG IV INF INIT: CPT

## 2020-01-01 PROCEDURE — 306565 RIGID MIT RESTRAINT(PAIR): Performed by: STUDENT IN AN ORGANIZED HEALTH CARE EDUCATION/TRAINING PROGRAM

## 2020-01-01 PROCEDURE — 84132 ASSAY OF SERUM POTASSIUM: CPT

## 2020-01-01 PROCEDURE — 93925 LOWER EXTREMITY STUDY: CPT | Mod: 26 | Performed by: INTERNAL MEDICINE

## 2020-01-01 PROCEDURE — 92610 EVALUATE SWALLOWING FUNCTION: CPT

## 2020-01-01 PROCEDURE — 86900 BLOOD TYPING SEROLOGIC ABO: CPT

## 2020-01-01 PROCEDURE — 86923 COMPATIBILITY TEST ELECTRIC: CPT | Mod: 91

## 2020-01-01 PROCEDURE — 82803 BLOOD GASES ANY COMBINATION: CPT

## 2020-01-01 PROCEDURE — 96361 HYDRATE IV INFUSION ADD-ON: CPT

## 2020-01-01 PROCEDURE — 82803 BLOOD GASES ANY COMBINATION: CPT | Mod: 91

## 2020-01-01 PROCEDURE — 80048 BASIC METABOLIC PNL TOTAL CA: CPT | Mod: 91

## 2020-01-01 PROCEDURE — 85025 COMPLETE CBC W/AUTO DIFF WBC: CPT | Mod: 91

## 2020-01-01 PROCEDURE — 03HY32Z INSERTION OF MONITORING DEVICE INTO UPPER ARTERY, PERCUTANEOUS APPROACH: ICD-10-PCS | Performed by: INTERNAL MEDICINE

## 2020-01-01 PROCEDURE — 0B9G8ZX DRAINAGE OF LEFT UPPER LUNG LOBE, VIA NATURAL OR ARTIFICIAL OPENING ENDOSCOPIC, DIAGNOSTIC: ICD-10-PCS | Performed by: INTERNAL MEDICINE

## 2020-01-01 PROCEDURE — 36430 TRANSFUSION BLD/BLD COMPNT: CPT

## 2020-01-01 PROCEDURE — 96368 THER/DIAG CONCURRENT INF: CPT

## 2020-01-01 PROCEDURE — 92941 PRQ TRLML REVSC TOT OCCL AMI: CPT | Mod: RC | Performed by: INTERNAL MEDICINE

## 2020-01-01 PROCEDURE — 76937 US GUIDE VASCULAR ACCESS: CPT

## 2020-01-01 PROCEDURE — 93306 TTE W/DOPPLER COMPLETE: CPT

## 2020-01-01 PROCEDURE — 36620 INSERTION CATHETER ARTERY: CPT

## 2020-01-01 PROCEDURE — 94150 VITAL CAPACITY TEST: CPT

## 2020-01-01 PROCEDURE — 94002 VENT MGMT INPAT INIT DAY: CPT

## 2020-01-01 PROCEDURE — 93458 L HRT ARTERY/VENTRICLE ANGIO: CPT | Mod: 26,59 | Performed by: INTERNAL MEDICINE

## 2020-01-01 PROCEDURE — 302136 NUTRITION PUMP: Performed by: INTERNAL MEDICINE

## 2020-01-01 PROCEDURE — 85007 BL SMEAR W/DIFF WBC COUNT: CPT

## 2020-01-01 PROCEDURE — C9803 HOPD COVID-19 SPEC COLLECT: HCPCS | Performed by: INTERNAL MEDICINE

## 2020-01-01 PROCEDURE — 5A12012 PERFORMANCE OF CARDIAC OUTPUT, SINGLE, MANUAL: ICD-10-PCS | Performed by: INTERNAL MEDICINE

## 2020-01-01 PROCEDURE — B2111ZZ FLUOROSCOPY OF MULTIPLE CORONARY ARTERIES USING LOW OSMOLAR CONTRAST: ICD-10-PCS | Performed by: INTERNAL MEDICINE

## 2020-01-01 PROCEDURE — 36556 INSERT NON-TUNNEL CV CATH: CPT

## 2020-01-01 PROCEDURE — 87070 CULTURE OTHR SPECIMN AEROBIC: CPT

## 2020-01-01 PROCEDURE — 81001 URINALYSIS AUTO W/SCOPE: CPT

## 2020-01-01 PROCEDURE — 94770 HCHG CO2 EXPIRED GAS DETERMINATION: CPT

## 2020-01-01 PROCEDURE — 94660 CPAP INITIATION&MGMT: CPT

## 2020-01-01 PROCEDURE — 84145 PROCALCITONIN (PCT): CPT

## 2020-01-01 PROCEDURE — 71260 CT THORAX DX C+: CPT

## 2020-01-01 PROCEDURE — 0B938ZZ DRAINAGE OF RIGHT MAIN BRONCHUS, VIA NATURAL OR ARTIFICIAL OPENING ENDOSCOPIC: ICD-10-PCS | Performed by: INTERNAL MEDICINE

## 2020-01-01 PROCEDURE — 05HC33Z INSERTION OF INFUSION DEVICE INTO LEFT BASILIC VEIN, PERCUTANEOUS APPROACH: ICD-10-PCS | Performed by: INTERNAL MEDICINE

## 2020-01-01 PROCEDURE — 83735 ASSAY OF MAGNESIUM: CPT | Mod: 91

## 2020-01-01 PROCEDURE — 83880 ASSAY OF NATRIURETIC PEPTIDE: CPT

## 2020-01-01 PROCEDURE — 99153 MOD SED SAME PHYS/QHP EA: CPT

## 2020-01-01 PROCEDURE — 0BB38ZZ EXCISION OF RIGHT MAIN BRONCHUS, VIA NATURAL OR ARTIFICIAL OPENING ENDOSCOPIC: ICD-10-PCS | Performed by: INTERNAL MEDICINE

## 2020-01-01 PROCEDURE — 82962 GLUCOSE BLOOD TEST: CPT

## 2020-01-01 PROCEDURE — 86850 RBC ANTIBODY SCREEN: CPT

## 2020-01-01 PROCEDURE — 84443 ASSAY THYROID STIM HORMONE: CPT

## 2020-01-01 PROCEDURE — 96360 HYDRATION IV INFUSION INIT: CPT

## 2020-01-01 PROCEDURE — 99221 1ST HOSP IP/OBS SF/LOW 40: CPT | Mod: 25 | Performed by: INTERNAL MEDICINE

## 2020-01-01 PROCEDURE — 85384 FIBRINOGEN ACTIVITY: CPT | Mod: 91

## 2020-01-01 PROCEDURE — 302146: Performed by: INTERNAL MEDICINE

## 2020-01-01 PROCEDURE — 85027 COMPLETE CBC AUTOMATED: CPT

## 2020-01-01 PROCEDURE — 96366 THER/PROPH/DIAG IV INF ADDON: CPT

## 2020-01-01 PROCEDURE — 93306 TTE W/DOPPLER COMPLETE: CPT | Mod: 26 | Performed by: INTERNAL MEDICINE

## 2020-01-01 PROCEDURE — P9016 RBC LEUKOCYTES REDUCED: HCPCS | Mod: 91

## 2020-01-01 PROCEDURE — 84134 ASSAY OF PREALBUMIN: CPT

## 2020-01-01 PROCEDURE — 93922 UPR/L XTREMITY ART 2 LEVELS: CPT | Mod: 26 | Performed by: INTERNAL MEDICINE

## 2020-01-01 PROCEDURE — 85347 COAGULATION TIME ACTIVATED: CPT

## 2020-01-01 PROCEDURE — 82330 ASSAY OF CALCIUM: CPT

## 2020-01-01 PROCEDURE — 85576 BLOOD PLATELET AGGREGATION: CPT | Mod: 91

## 2020-01-01 PROCEDURE — 30233N1 TRANSFUSION OF NONAUTOLOGOUS RED BLOOD CELLS INTO PERIPHERAL VEIN, PERCUTANEOUS APPROACH: ICD-10-PCS | Performed by: INTERNAL MEDICINE

## 2020-01-01 PROCEDURE — 93925 LOWER EXTREMITY STUDY: CPT

## 2020-01-01 PROCEDURE — 87040 BLOOD CULTURE FOR BACTERIA: CPT

## 2020-01-01 PROCEDURE — 97166 OT EVAL MOD COMPLEX 45 MIN: CPT

## 2020-01-01 PROCEDURE — 80053 COMPREHEN METABOLIC PANEL: CPT

## 2020-01-01 PROCEDURE — 700117 HCHG RX CONTRAST REV CODE 255: Performed by: INTERNAL MEDICINE

## 2020-01-01 PROCEDURE — 80061 LIPID PANEL: CPT

## 2020-01-01 PROCEDURE — 36620 INSERTION CATHETER ARTERY: CPT | Mod: GC | Performed by: INTERNAL MEDICINE

## 2020-01-01 PROCEDURE — 83735 ASSAY OF MAGNESIUM: CPT

## 2020-01-01 PROCEDURE — 36415 COLL VENOUS BLD VENIPUNCTURE: CPT

## 2020-01-01 PROCEDURE — 87205 SMEAR GRAM STAIN: CPT | Mod: 91

## 2020-01-01 PROCEDURE — 80307 DRUG TEST PRSMV CHEM ANLYZR: CPT

## 2020-01-01 PROCEDURE — 92950 HEART/LUNG RESUSCITATION CPR: CPT | Performed by: INTERNAL MEDICINE

## 2020-01-01 PROCEDURE — 97162 PT EVAL MOD COMPLEX 30 MIN: CPT

## 2020-01-01 PROCEDURE — 0B968ZZ DRAINAGE OF RIGHT LOWER LOBE BRONCHUS, VIA NATURAL OR ARTIFICIAL OPENING ENDOSCOPIC: ICD-10-PCS | Performed by: INTERNAL MEDICINE

## 2020-01-01 PROCEDURE — 05HY33Z INSERTION OF INFUSION DEVICE INTO UPPER VEIN, PERCUTANEOUS APPROACH: ICD-10-PCS | Performed by: INTERNAL MEDICINE

## 2020-01-01 PROCEDURE — 36556 INSERT NON-TUNNEL CV CATH: CPT | Mod: RT,GC | Performed by: INTERNAL MEDICINE

## 2020-01-01 PROCEDURE — 04HY32Z INSERTION OF MONITORING DEVICE INTO LOWER ARTERY, PERCUTANEOUS APPROACH: ICD-10-PCS | Performed by: INTERNAL MEDICINE

## 2020-01-01 PROCEDURE — 85347 COAGULATION TIME ACTIVATED: CPT | Mod: 91

## 2020-01-01 PROCEDURE — 92523 SPEECH SOUND LANG COMPREHEN: CPT

## 2020-01-01 PROCEDURE — 700101 HCHG RX REV CODE 250

## 2020-01-01 PROCEDURE — 89051 BODY FLUID CELL COUNT: CPT

## 2020-01-01 RX ORDER — SODIUM CHLORIDE 9 MG/ML
INJECTION, SOLUTION INTRAVENOUS
Status: COMPLETED
Start: 2020-01-01 | End: 2020-01-01

## 2020-01-01 RX ORDER — DEXTROSE MONOHYDRATE 25 G/50ML
50 INJECTION, SOLUTION INTRAVENOUS
Status: DISCONTINUED | OUTPATIENT
Start: 2020-01-01 | End: 2020-01-01

## 2020-01-01 RX ORDER — LIDOCAINE HYDROCHLORIDE 20 MG/ML
JELLY TOPICAL
Status: COMPLETED | OUTPATIENT
Start: 2020-01-01 | End: 2020-01-01

## 2020-01-01 RX ORDER — MIDAZOLAM HYDROCHLORIDE 1 MG/ML
INJECTION INTRAMUSCULAR; INTRAVENOUS
Status: ACTIVE
Start: 2020-01-01 | End: 2020-01-01

## 2020-01-01 RX ORDER — ONDANSETRON 2 MG/ML
8 INJECTION INTRAMUSCULAR; INTRAVENOUS EVERY 8 HOURS PRN
Status: DISCONTINUED | OUTPATIENT
Start: 2020-01-01 | End: 2020-01-01 | Stop reason: HOSPADM

## 2020-01-01 RX ORDER — BISACODYL 10 MG
10 SUPPOSITORY, RECTAL RECTAL
Status: DISCONTINUED | OUTPATIENT
Start: 2020-01-01 | End: 2020-01-01 | Stop reason: HOSPADM

## 2020-01-01 RX ORDER — ONDANSETRON 4 MG/1
4 TABLET, ORALLY DISINTEGRATING ORAL EVERY 4 HOURS PRN
Status: DISCONTINUED | OUTPATIENT
Start: 2020-01-01 | End: 2020-01-01

## 2020-01-01 RX ORDER — POLYETHYLENE GLYCOL 3350 17 G/17G
1 POWDER, FOR SOLUTION ORAL
Status: DISCONTINUED | OUTPATIENT
Start: 2020-01-01 | End: 2020-01-01

## 2020-01-01 RX ORDER — HEPARIN SODIUM 1000 [USP'U]/ML
3700 INJECTION, SOLUTION INTRAVENOUS; SUBCUTANEOUS PRN
Status: DISCONTINUED | OUTPATIENT
Start: 2020-01-01 | End: 2020-01-01

## 2020-01-01 RX ORDER — METOPROLOL TARTRATE 1 MG/ML
5 INJECTION, SOLUTION INTRAVENOUS
Status: DISCONTINUED | OUTPATIENT
Start: 2020-01-01 | End: 2020-01-01 | Stop reason: HOSPADM

## 2020-01-01 RX ORDER — ASPIRIN 300 MG/1
300 SUPPOSITORY RECTAL DAILY
Status: DISCONTINUED | OUTPATIENT
Start: 2020-01-01 | End: 2020-01-01

## 2020-01-01 RX ORDER — LISINOPRIL 5 MG/1
2.5 TABLET ORAL
Status: DISCONTINUED | OUTPATIENT
Start: 2020-01-01 | End: 2020-01-01

## 2020-01-01 RX ORDER — ASPIRIN 325 MG
325 TABLET ORAL DAILY
Status: DISCONTINUED | OUTPATIENT
Start: 2020-01-01 | End: 2020-01-01

## 2020-01-01 RX ORDER — NOREPINEPHRINE BITARTRATE 0.03 MG/ML
INJECTION, SOLUTION INTRAVENOUS
Status: ACTIVE
Start: 2020-01-01 | End: 2020-01-01

## 2020-01-01 RX ORDER — CLOPIDOGREL BISULFATE 75 MG/1
75 TABLET ORAL DAILY
Status: DISCONTINUED | OUTPATIENT
Start: 2020-01-01 | End: 2020-01-01

## 2020-01-01 RX ORDER — HEPARIN SODIUM 5000 [USP'U]/100ML
INJECTION, SOLUTION INTRAVENOUS CONTINUOUS
Status: DISCONTINUED | OUTPATIENT
Start: 2020-01-01 | End: 2020-01-01

## 2020-01-01 RX ORDER — DEXTROSE MONOHYDRATE 50 MG/ML
INJECTION, SOLUTION INTRAVENOUS CONTINUOUS
Status: DISCONTINUED | OUTPATIENT
Start: 2020-01-01 | End: 2020-01-01

## 2020-01-01 RX ORDER — FUROSEMIDE 10 MG/ML
20 INJECTION INTRAMUSCULAR; INTRAVENOUS 2 TIMES DAILY
Status: DISCONTINUED | OUTPATIENT
Start: 2020-01-01 | End: 2020-01-01

## 2020-01-01 RX ORDER — FUROSEMIDE 10 MG/ML
20 INJECTION INTRAMUSCULAR; INTRAVENOUS
Status: DISCONTINUED | OUTPATIENT
Start: 2020-01-01 | End: 2020-01-01

## 2020-01-01 RX ORDER — ATORVASTATIN CALCIUM 40 MG/1
40 TABLET, FILM COATED ORAL EVERY EVENING
Status: DISCONTINUED | OUTPATIENT
Start: 2020-01-01 | End: 2020-01-01

## 2020-01-01 RX ORDER — IPRATROPIUM BROMIDE AND ALBUTEROL SULFATE 2.5; .5 MG/3ML; MG/3ML
3 SOLUTION RESPIRATORY (INHALATION)
Status: DISCONTINUED | OUTPATIENT
Start: 2020-01-01 | End: 2020-01-01

## 2020-01-01 RX ORDER — MORPHINE SULFATE 10 MG/ML
10 INJECTION, SOLUTION INTRAMUSCULAR; INTRAVENOUS
Status: DISCONTINUED | OUTPATIENT
Start: 2020-01-01 | End: 2020-01-01 | Stop reason: HOSPADM

## 2020-01-01 RX ORDER — EPINEPHRINE HCL IN 0.9 % NACL 4MG/250ML
0-20 PLASTIC BAG, INJECTION (ML) INTRAVENOUS CONTINUOUS
Status: DISCONTINUED | OUTPATIENT
Start: 2020-01-01 | End: 2020-01-01

## 2020-01-01 RX ORDER — ASPIRIN 81 MG/1
81 TABLET, CHEWABLE ORAL DAILY
Status: DISCONTINUED | OUTPATIENT
Start: 2020-01-01 | End: 2020-01-01

## 2020-01-01 RX ORDER — HEPARIN SODIUM,PORCINE 1000/ML
VIAL (ML) INJECTION
Status: COMPLETED
Start: 2020-01-01 | End: 2020-01-01

## 2020-01-01 RX ORDER — FAMOTIDINE 20 MG/1
20 TABLET, FILM COATED ORAL DAILY
Status: DISCONTINUED | OUTPATIENT
Start: 2020-01-01 | End: 2020-01-01

## 2020-01-01 RX ORDER — MAGNESIUM SULFATE HEPTAHYDRATE 40 MG/ML
2 INJECTION, SOLUTION INTRAVENOUS ONCE
Status: COMPLETED | OUTPATIENT
Start: 2020-01-01 | End: 2020-01-01

## 2020-01-01 RX ORDER — ACETAMINOPHEN 325 MG/1
650 TABLET ORAL EVERY 6 HOURS PRN
Status: DISCONTINUED | OUTPATIENT
Start: 2020-01-01 | End: 2020-01-01

## 2020-01-01 RX ORDER — LIDOCAINE HYDROCHLORIDE 20 MG/ML
INJECTION, SOLUTION INFILTRATION; PERINEURAL
Status: COMPLETED
Start: 2020-01-01 | End: 2020-01-01

## 2020-01-01 RX ORDER — POTASSIUM CHLORIDE 29.8 MG/ML
40 INJECTION INTRAVENOUS ONCE
Status: COMPLETED | OUTPATIENT
Start: 2020-01-01 | End: 2020-01-01

## 2020-01-01 RX ORDER — VERAPAMIL HYDROCHLORIDE 2.5 MG/ML
INJECTION, SOLUTION INTRAVENOUS
Status: COMPLETED
Start: 2020-01-01 | End: 2020-01-01

## 2020-01-01 RX ORDER — BUDESONIDE AND FORMOTEROL FUMARATE DIHYDRATE 80; 4.5 UG/1; UG/1
2 AEROSOL RESPIRATORY (INHALATION)
Status: DISCONTINUED | OUTPATIENT
Start: 2020-01-01 | End: 2020-01-01

## 2020-01-01 RX ORDER — ATROPINE SULFATE 10 MG/ML
2 SOLUTION/ DROPS OPHTHALMIC EVERY 4 HOURS PRN
Status: DISCONTINUED | OUTPATIENT
Start: 2020-01-01 | End: 2020-01-01 | Stop reason: HOSPADM

## 2020-01-01 RX ORDER — HEPARIN SODIUM 200 [USP'U]/100ML
INJECTION, SOLUTION INTRAVENOUS
Status: COMPLETED
Start: 2020-01-01 | End: 2020-01-01

## 2020-01-01 RX ORDER — FAMOTIDINE 20 MG/1
20 TABLET, FILM COATED ORAL EVERY 12 HOURS
Status: DISCONTINUED | OUTPATIENT
Start: 2020-01-01 | End: 2020-01-01

## 2020-01-01 RX ORDER — NOREPINEPHRINE BITARTRATE 0.03 MG/ML
0-30 INJECTION, SOLUTION INTRAVENOUS CONTINUOUS
Status: CANCELLED | OUTPATIENT
Start: 2020-01-01

## 2020-01-01 RX ORDER — ENEMA 19; 7 G/133ML; G/133ML
1 ENEMA RECTAL ONCE
Status: COMPLETED | OUTPATIENT
Start: 2020-01-01 | End: 2020-01-01

## 2020-01-01 RX ORDER — LABETALOL HYDROCHLORIDE 5 MG/ML
10 INJECTION, SOLUTION INTRAVENOUS EVERY 4 HOURS PRN
Status: DISCONTINUED | OUTPATIENT
Start: 2020-01-01 | End: 2020-01-01 | Stop reason: HOSPADM

## 2020-01-01 RX ORDER — DOPAMINE HYDROCHLORIDE 160 MG/100ML
0-20 INJECTION, SOLUTION INTRAVENOUS CONTINUOUS
Status: DISCONTINUED | OUTPATIENT
Start: 2020-01-01 | End: 2020-01-01

## 2020-01-01 RX ORDER — NOREPINEPHRINE BITARTRATE 0.03 MG/ML
0-30 INJECTION, SOLUTION INTRAVENOUS CONTINUOUS
Status: DISCONTINUED | OUTPATIENT
Start: 2020-01-01 | End: 2020-01-01

## 2020-01-01 RX ORDER — MIDAZOLAM HYDROCHLORIDE 1 MG/ML
2 INJECTION INTRAMUSCULAR; INTRAVENOUS ONCE
Status: COMPLETED | OUTPATIENT
Start: 2020-01-01 | End: 2020-01-01

## 2020-01-01 RX ORDER — EPINEPHRINE IN SOD CHLOR,ISO 1 MG/10 ML
SYRINGE (ML) INTRAVENOUS
Status: COMPLETED | OUTPATIENT
Start: 2020-01-01 | End: 2020-01-01

## 2020-01-01 RX ORDER — LORAZEPAM 2 MG/ML
INJECTION INTRAMUSCULAR
Status: COMPLETED
Start: 2020-01-01 | End: 2020-01-01

## 2020-01-01 RX ORDER — MIDAZOLAM HYDROCHLORIDE 1 MG/ML
INJECTION INTRAMUSCULAR; INTRAVENOUS
Status: COMPLETED
Start: 2020-01-01 | End: 2020-01-01

## 2020-01-01 RX ORDER — CLOPIDOGREL 300 MG/1
300 TABLET, FILM COATED ORAL ONCE
Status: COMPLETED | OUTPATIENT
Start: 2020-01-01 | End: 2020-01-01

## 2020-01-01 RX ORDER — PROTAMINE SULFATE 10 MG/ML
10 INJECTION, SOLUTION INTRAVENOUS ONCE
Status: COMPLETED | OUTPATIENT
Start: 2020-01-01 | End: 2020-01-01

## 2020-01-01 RX ORDER — FAMOTIDINE 20 MG/1
20 TABLET, FILM COATED ORAL 2 TIMES DAILY
Status: DISCONTINUED | OUTPATIENT
Start: 2020-01-01 | End: 2020-01-01

## 2020-01-01 RX ORDER — METOPROLOL TARTRATE 1 MG/ML
5 INJECTION, SOLUTION INTRAVENOUS ONCE
Status: COMPLETED | OUTPATIENT
Start: 2020-01-01 | End: 2020-01-01

## 2020-01-01 RX ORDER — DOPAMINE HYDROCHLORIDE 160 MG/100ML
INJECTION, SOLUTION INTRAVENOUS
Status: COMPLETED
Start: 2020-01-01 | End: 2020-01-01

## 2020-01-01 RX ORDER — ONDANSETRON 4 MG/1
8 TABLET, ORALLY DISINTEGRATING ORAL EVERY 8 HOURS PRN
Status: DISCONTINUED | OUTPATIENT
Start: 2020-01-01 | End: 2020-01-01 | Stop reason: HOSPADM

## 2020-01-01 RX ORDER — AMOXICILLIN 250 MG
2 CAPSULE ORAL 2 TIMES DAILY
Status: DISCONTINUED | OUTPATIENT
Start: 2020-01-01 | End: 2020-01-01

## 2020-01-01 RX ORDER — BISACODYL 10 MG
10 SUPPOSITORY, RECTAL RECTAL
Status: DISCONTINUED | OUTPATIENT
Start: 2020-01-01 | End: 2020-01-01

## 2020-01-01 RX ORDER — IBUPROFEN 600 MG/1
600 TABLET ORAL EVERY 6 HOURS PRN
Status: DISCONTINUED | OUTPATIENT
Start: 2020-01-01 | End: 2020-01-01 | Stop reason: HOSPADM

## 2020-01-01 RX ORDER — HALOPERIDOL 2 MG/ML
1 SOLUTION ORAL EVERY 6 HOURS PRN
Status: DISCONTINUED | OUTPATIENT
Start: 2020-01-01 | End: 2020-01-01 | Stop reason: HOSPADM

## 2020-01-01 RX ORDER — POTASSIUM CHLORIDE 20 MEQ/1
60 TABLET, EXTENDED RELEASE ORAL ONCE
Status: COMPLETED | OUTPATIENT
Start: 2020-01-01 | End: 2020-01-01

## 2020-01-01 RX ORDER — LORAZEPAM 2 MG/ML
1 INJECTION INTRAMUSCULAR ONCE
Status: COMPLETED | OUTPATIENT
Start: 2020-01-01 | End: 2020-01-01

## 2020-01-01 RX ORDER — LACTULOSE 20 G/30ML
10 SOLUTION ORAL
Status: DISCONTINUED | OUTPATIENT
Start: 2020-01-01 | End: 2020-01-01 | Stop reason: HOSPADM

## 2020-01-01 RX ORDER — AMIODARONE HYDROCHLORIDE 200 MG/1
400 TABLET ORAL
Status: DISCONTINUED | OUTPATIENT
Start: 2020-01-01 | End: 2020-01-01

## 2020-01-01 RX ORDER — PHENYLEPHRINE HCL IN 0.9% NACL 0.5 MG/5ML
SYRINGE (ML) INTRAVENOUS
Status: COMPLETED
Start: 2020-01-01 | End: 2020-01-01

## 2020-01-01 RX ORDER — DEXMEDETOMIDINE HYDROCHLORIDE 4 UG/ML
0-1.5 INJECTION, SOLUTION INTRAVENOUS CONTINUOUS
Status: DISCONTINUED | OUTPATIENT
Start: 2020-01-01 | End: 2020-01-01

## 2020-01-01 RX ORDER — ASPIRIN 81 MG/1
324 TABLET, CHEWABLE ORAL DAILY
Status: DISCONTINUED | OUTPATIENT
Start: 2020-01-01 | End: 2020-01-01

## 2020-01-01 RX ORDER — MORPHINE SULFATE 10 MG/ML
5 INJECTION, SOLUTION INTRAMUSCULAR; INTRAVENOUS
Status: DISCONTINUED | OUTPATIENT
Start: 2020-01-01 | End: 2020-01-01 | Stop reason: HOSPADM

## 2020-01-01 RX ORDER — SODIUM CHLORIDE 9 MG/ML
INJECTION, SOLUTION INTRAVENOUS
Status: ACTIVE
Start: 2020-01-01 | End: 2020-01-01

## 2020-01-01 RX ORDER — SODIUM CHLORIDE 9 MG/ML
INJECTION, SOLUTION INTRAVENOUS CONTINUOUS
Status: ACTIVE | OUTPATIENT
Start: 2020-01-01 | End: 2020-01-01

## 2020-01-01 RX ORDER — HALOPERIDOL 5 MG/ML
1 INJECTION INTRAMUSCULAR EVERY 6 HOURS PRN
Status: DISCONTINUED | OUTPATIENT
Start: 2020-01-01 | End: 2020-01-01 | Stop reason: HOSPADM

## 2020-01-01 RX ORDER — RISPERIDONE 1 MG/1
1 TABLET ORAL 2 TIMES DAILY
Status: DISCONTINUED | OUTPATIENT
Start: 2020-01-01 | End: 2020-01-01

## 2020-01-01 RX ORDER — TRANEXAMIC ACID 100 MG/ML
1000 INJECTION, SOLUTION INTRAVENOUS ONCE
Status: COMPLETED | OUTPATIENT
Start: 2020-01-01 | End: 2020-01-01

## 2020-01-01 RX ORDER — ONDANSETRON 2 MG/ML
4 INJECTION INTRAMUSCULAR; INTRAVENOUS EVERY 4 HOURS PRN
Status: DISCONTINUED | OUTPATIENT
Start: 2020-01-01 | End: 2020-01-01

## 2020-01-01 RX ORDER — DOCUSATE SODIUM 100 MG/1
100 CAPSULE, LIQUID FILLED ORAL EVERY 12 HOURS
Status: DISCONTINUED | OUTPATIENT
Start: 2020-01-01 | End: 2020-01-01

## 2020-01-01 RX ADMIN — HYDROCORTISONE SODIUM SUCCINATE 50 MG: 100 INJECTION, POWDER, FOR SOLUTION INTRAMUSCULAR; INTRAVENOUS at 05:06

## 2020-01-01 RX ADMIN — MAGNESIUM SULFATE 2 G: 2 INJECTION INTRAVENOUS at 02:50

## 2020-01-01 RX ADMIN — AMPICILLIN AND SULBACTAM 3 G: 2; 1 INJECTION, POWDER, FOR SOLUTION INTRAVENOUS at 12:14

## 2020-01-01 RX ADMIN — AMIODARONE HYDROCHLORIDE 1 MG/MIN: 1.8 INJECTION, SOLUTION INTRAVENOUS at 01:01

## 2020-01-01 RX ADMIN — MAGNESIUM SULFATE 2 G: 2 INJECTION INTRAVENOUS at 21:18

## 2020-01-01 RX ADMIN — FAMOTIDINE 20 MG: 10 INJECTION, SOLUTION INTRAVENOUS at 00:28

## 2020-01-01 RX ADMIN — DEXMEDETOMIDINE HYDROCHLORIDE 1 MCG/KG/HR: 100 INJECTION, SOLUTION INTRAVENOUS at 23:46

## 2020-01-01 RX ADMIN — DEXMEDETOMIDINE HYDROCHLORIDE 0.7 MCG/KG/HR: 100 INJECTION, SOLUTION INTRAVENOUS at 05:26

## 2020-01-01 RX ADMIN — AMIODARONE HYDROCHLORIDE 1 MG/MIN: 1.8 INJECTION, SOLUTION INTRAVENOUS at 05:28

## 2020-01-01 RX ADMIN — ACETAMINOPHEN 650 MG: 325 TABLET, FILM COATED ORAL at 00:35

## 2020-01-01 RX ADMIN — CEFEPIME 2 G: 2 INJECTION, POWDER, FOR SOLUTION INTRAVENOUS at 09:43

## 2020-01-01 RX ADMIN — FAMOTIDINE 20 MG: 20 TABLET, FILM COATED ORAL at 05:06

## 2020-01-01 RX ADMIN — AMPICILLIN AND SULBACTAM 3 G: 2; 1 INJECTION, POWDER, FOR SOLUTION INTRAVENOUS at 05:13

## 2020-01-01 RX ADMIN — SODIUM BICARBONATE: 84 INJECTION, SOLUTION INTRAVENOUS at 13:37

## 2020-01-01 RX ADMIN — AMIODARONE HYDROCHLORIDE 400 MG: 200 TABLET ORAL at 06:15

## 2020-01-01 RX ADMIN — FAMOTIDINE 20 MG: 20 TABLET, FILM COATED ORAL at 05:01

## 2020-01-01 RX ADMIN — POTASSIUM BICARBONATE 25 MEQ: 978 TABLET, EFFERVESCENT ORAL at 17:28

## 2020-01-01 RX ADMIN — CLOPIDOGREL BISULFATE 75 MG: 75 TABLET ORAL at 05:01

## 2020-01-01 RX ADMIN — HYDROCORTISONE SODIUM SUCCINATE 50 MG: 100 INJECTION, POWDER, FOR SOLUTION INTRAMUSCULAR; INTRAVENOUS at 21:58

## 2020-01-01 RX ADMIN — HEPARIN SODIUM 3700 UNITS: 1000 INJECTION, SOLUTION INTRAVENOUS; SUBCUTANEOUS at 20:07

## 2020-01-01 RX ADMIN — HEPARIN SODIUM 750 UNITS: 5000 INJECTION, SOLUTION INTRAVENOUS at 20:10

## 2020-01-01 RX ADMIN — LABETALOL HYDROCHLORIDE 10 MG: 5 INJECTION, SOLUTION INTRAVENOUS at 01:10

## 2020-01-01 RX ADMIN — AMPICILLIN AND SULBACTAM 3 G: 2; 1 INJECTION, POWDER, FOR SOLUTION INTRAVENOUS at 17:49

## 2020-01-01 RX ADMIN — ASPIRIN 81 MG 81 MG: 81 TABLET ORAL at 06:15

## 2020-01-01 RX ADMIN — IPRATROPIUM BROMIDE AND ALBUTEROL SULFATE 3 ML: .5; 3 SOLUTION RESPIRATORY (INHALATION) at 23:39

## 2020-01-01 RX ADMIN — AMPICILLIN AND SULBACTAM 3 G: 2; 1 INJECTION, POWDER, FOR SOLUTION INTRAVENOUS at 00:30

## 2020-01-01 RX ADMIN — SODIUM BICARBONATE: 84 INJECTION, SOLUTION INTRAVENOUS at 02:11

## 2020-01-01 RX ADMIN — AMIODARONE HYDROCHLORIDE 1 MG/MIN: 1.8 INJECTION, SOLUTION INTRAVENOUS at 10:05

## 2020-01-01 RX ADMIN — AMPICILLIN AND SULBACTAM 3 G: 2; 1 INJECTION, POWDER, FOR SOLUTION INTRAVENOUS at 23:17

## 2020-01-01 RX ADMIN — LABETALOL HYDROCHLORIDE 10 MG: 5 INJECTION, SOLUTION INTRAVENOUS at 01:11

## 2020-01-01 RX ADMIN — APIXABAN 5 MG: 5 TABLET, FILM COATED ORAL at 16:52

## 2020-01-01 RX ADMIN — MIDAZOLAM HYDROCHLORIDE 2 MG: 1 INJECTION, SOLUTION INTRAMUSCULAR; INTRAVENOUS at 23:20

## 2020-01-01 RX ADMIN — HYDROCORTISONE SODIUM SUCCINATE 50 MG: 100 INJECTION, POWDER, FOR SOLUTION INTRAMUSCULAR; INTRAVENOUS at 05:01

## 2020-01-01 RX ADMIN — LORAZEPAM 2 MG: 2 INJECTION INTRAMUSCULAR; INTRAVENOUS at 12:41

## 2020-01-01 RX ADMIN — ATORVASTATIN CALCIUM 40 MG: 40 TABLET, FILM COATED ORAL at 16:50

## 2020-01-01 RX ADMIN — DEXMEDETOMIDINE HYDROCHLORIDE 0.8 MCG/KG/HR: 100 INJECTION, SOLUTION INTRAVENOUS at 03:15

## 2020-01-01 RX ADMIN — DOCUSATE SODIUM 50 MG AND SENNOSIDES 8.6 MG 2 TABLET: 8.6; 5 TABLET, FILM COATED ORAL at 17:34

## 2020-01-01 RX ADMIN — DEXMEDETOMIDINE HYDROCHLORIDE 0.2 MCG/KG/HR: 100 INJECTION, SOLUTION INTRAVENOUS at 00:01

## 2020-01-01 RX ADMIN — METHYLNALTREXONE BROMIDE 12 MG: 12 INJECTION, SOLUTION SUBCUTANEOUS at 13:48

## 2020-01-01 RX ADMIN — IPRATROPIUM BROMIDE AND ALBUTEROL SULFATE 3 ML: .5; 3 SOLUTION RESPIRATORY (INHALATION) at 10:16

## 2020-01-01 RX ADMIN — ATORVASTATIN CALCIUM 40 MG: 40 TABLET, FILM COATED ORAL at 17:34

## 2020-01-01 RX ADMIN — NOREPINEPHRINE BITARTRATE 14 MCG/MIN: 1 INJECTION INTRAVENOUS at 20:28

## 2020-01-01 RX ADMIN — ASPIRIN 81 MG 81 MG: 81 TABLET ORAL at 05:45

## 2020-01-01 RX ADMIN — IPRATROPIUM BROMIDE AND ALBUTEROL SULFATE 3 ML: .5; 3 SOLUTION RESPIRATORY (INHALATION) at 06:42

## 2020-01-01 RX ADMIN — ASPIRIN 81 MG 81 MG: 81 TABLET ORAL at 06:05

## 2020-01-01 RX ADMIN — NOREPINEPHRINE BITARTRATE 1 MCG/MIN: 1 INJECTION INTRAVENOUS at 02:46

## 2020-01-01 RX ADMIN — DOCUSATE SODIUM 50 MG AND SENNOSIDES 8.6 MG 2 TABLET: 8.6; 5 TABLET, FILM COATED ORAL at 17:11

## 2020-01-01 RX ADMIN — POTASSIUM BICARBONATE 50 MEQ: 978 TABLET, EFFERVESCENT ORAL at 07:34

## 2020-01-01 RX ADMIN — IPRATROPIUM BROMIDE AND ALBUTEROL SULFATE 3 ML: .5; 3 SOLUTION RESPIRATORY (INHALATION) at 02:06

## 2020-01-01 RX ADMIN — FUROSEMIDE 20 MG: 10 INJECTION, SOLUTION INTRAMUSCULAR; INTRAVENOUS at 18:00

## 2020-01-01 RX ADMIN — POTASSIUM CHLORIDE 60 MEQ: 1500 TABLET, EXTENDED RELEASE ORAL at 06:16

## 2020-01-01 RX ADMIN — IPRATROPIUM BROMIDE AND ALBUTEROL SULFATE 3 ML: .5; 3 SOLUTION RESPIRATORY (INHALATION) at 14:32

## 2020-01-01 RX ADMIN — METOPROLOL TARTRATE 5 MG: 5 INJECTION, SOLUTION INTRAVENOUS at 15:04

## 2020-01-01 RX ADMIN — BUDESONIDE AND FORMOTEROL FUMARATE DIHYDRATE 2 PUFF: 80; 4.5 AEROSOL RESPIRATORY (INHALATION) at 07:41

## 2020-01-01 RX ADMIN — POTASSIUM BICARBONATE 50 MEQ: 978 TABLET, EFFERVESCENT ORAL at 17:48

## 2020-01-01 RX ADMIN — FENTANYL CITRATE 100 MCG: 50 INJECTION INTRAMUSCULAR; INTRAVENOUS at 20:42

## 2020-01-01 RX ADMIN — IOHEXOL 75 ML: 350 INJECTION, SOLUTION INTRAVENOUS at 19:36

## 2020-01-01 RX ADMIN — HYDROCORTISONE SODIUM SUCCINATE 50 MG: 100 INJECTION, POWDER, FOR SOLUTION INTRAMUSCULAR; INTRAVENOUS at 22:27

## 2020-01-01 RX ADMIN — MAGNESIUM HYDROXIDE 30 ML: 400 SUSPENSION ORAL at 17:32

## 2020-01-01 RX ADMIN — AMIODARONE HYDROCHLORIDE 400 MG: 200 TABLET ORAL at 06:05

## 2020-01-01 RX ADMIN — POTASSIUM BICARBONATE 25 MEQ: 978 TABLET, EFFERVESCENT ORAL at 06:46

## 2020-01-01 RX ADMIN — DOCUSATE SODIUM 50 MG AND SENNOSIDES 8.6 MG 2 TABLET: 8.6; 5 TABLET, FILM COATED ORAL at 06:15

## 2020-01-01 RX ADMIN — ATORVASTATIN CALCIUM 40 MG: 40 TABLET, FILM COATED ORAL at 17:25

## 2020-01-01 RX ADMIN — HEPARIN SODIUM 2000 UNITS: 200 INJECTION, SOLUTION INTRAVENOUS at 22:19

## 2020-01-01 RX ADMIN — POTASSIUM BICARBONATE 50 MEQ: 978 TABLET, EFFERVESCENT ORAL at 05:01

## 2020-01-01 RX ADMIN — APIXABAN 5 MG: 5 TABLET, FILM COATED ORAL at 09:54

## 2020-01-01 RX ADMIN — FENTANYL CITRATE 100 MCG: 50 INJECTION INTRAMUSCULAR; INTRAVENOUS at 03:03

## 2020-01-01 RX ADMIN — FUROSEMIDE 20 MG: 10 INJECTION, SOLUTION INTRAMUSCULAR; INTRAVENOUS at 05:07

## 2020-01-01 RX ADMIN — AMPICILLIN AND SULBACTAM 3 G: 2; 1 INJECTION, POWDER, FOR SOLUTION INTRAVENOUS at 05:45

## 2020-01-01 RX ADMIN — HYDROCORTISONE SODIUM SUCCINATE 50 MG: 100 INJECTION, POWDER, FOR SOLUTION INTRAMUSCULAR; INTRAVENOUS at 16:50

## 2020-01-01 RX ADMIN — DIBASIC SODIUM PHOSPHATE, MONOBASIC POTASSIUM PHOSPHATE AND MONOBASIC SODIUM PHOSPHATE 250 MG: 852; 155; 130 TABLET ORAL at 07:53

## 2020-01-01 RX ADMIN — ASPIRIN 81 MG: 81 TABLET, COATED ORAL at 06:12

## 2020-01-01 RX ADMIN — IPRATROPIUM BROMIDE AND ALBUTEROL SULFATE 3 ML: .5; 3 SOLUTION RESPIRATORY (INHALATION) at 06:30

## 2020-01-01 RX ADMIN — IOHEXOL 55 ML: 350 INJECTION, SOLUTION INTRAVENOUS at 22:23

## 2020-01-01 RX ADMIN — FUROSEMIDE 20 MG: 10 INJECTION, SOLUTION INTRAMUSCULAR; INTRAVENOUS at 07:54

## 2020-01-01 RX ADMIN — NOREPINEPHRINE BITARTRATE 25 MCG/MIN: 1 INJECTION INTRAVENOUS at 01:25

## 2020-01-01 RX ADMIN — IPRATROPIUM BROMIDE AND ALBUTEROL SULFATE 3 ML: .5; 3 SOLUTION RESPIRATORY (INHALATION) at 15:03

## 2020-01-01 RX ADMIN — IPRATROPIUM BROMIDE AND ALBUTEROL SULFATE 3 ML: .5; 3 SOLUTION RESPIRATORY (INHALATION) at 15:01

## 2020-01-01 RX ADMIN — FENTANYL CITRATE 100 MCG: 50 INJECTION INTRAMUSCULAR; INTRAVENOUS at 11:09

## 2020-01-01 RX ADMIN — HEPARIN SODIUM: 1000 INJECTION, SOLUTION INTRAVENOUS; SUBCUTANEOUS at 21:45

## 2020-01-01 RX ADMIN — AMIODARONE HYDROCHLORIDE 0.5 MG/MIN: 1.8 INJECTION, SOLUTION INTRAVENOUS at 10:00

## 2020-01-01 RX ADMIN — IPRATROPIUM BROMIDE AND ALBUTEROL SULFATE 3 ML: .5; 3 SOLUTION RESPIRATORY (INHALATION) at 07:41

## 2020-01-01 RX ADMIN — CLOPIDOGREL BISULFATE 75 MG: 75 TABLET ORAL at 06:16

## 2020-01-01 RX ADMIN — IPRATROPIUM BROMIDE AND ALBUTEROL SULFATE 3 ML: .5; 3 SOLUTION RESPIRATORY (INHALATION) at 06:23

## 2020-01-01 RX ADMIN — IPRATROPIUM BROMIDE AND ALBUTEROL SULFATE 3 ML: .5; 3 SOLUTION RESPIRATORY (INHALATION) at 14:53

## 2020-01-01 RX ADMIN — AMPICILLIN AND SULBACTAM 3 G: 2; 1 INJECTION, POWDER, FOR SOLUTION INTRAVENOUS at 13:29

## 2020-01-01 RX ADMIN — IPRATROPIUM BROMIDE AND ALBUTEROL SULFATE 3 ML: .5; 3 SOLUTION RESPIRATORY (INHALATION) at 02:51

## 2020-01-01 RX ADMIN — POTASSIUM BICARBONATE 50 MEQ: 978 TABLET, EFFERVESCENT ORAL at 09:54

## 2020-01-01 RX ADMIN — DEXMEDETOMIDINE HYDROCHLORIDE 0.8 MCG/KG/HR: 100 INJECTION, SOLUTION INTRAVENOUS at 01:01

## 2020-01-01 RX ADMIN — POTASSIUM PHOSPHATE, MONOBASIC AND POTASSIUM PHOSPHATE, DIBASIC 15 MMOL: 224; 236 INJECTION, SOLUTION, CONCENTRATE INTRAVENOUS at 07:46

## 2020-01-01 RX ADMIN — ASPIRIN 81 MG 81 MG: 81 TABLET ORAL at 04:59

## 2020-01-01 RX ADMIN — DOCUSATE SODIUM 50 MG AND SENNOSIDES 8.6 MG 2 TABLET: 8.6; 5 TABLET, FILM COATED ORAL at 17:42

## 2020-01-01 RX ADMIN — RISPERIDONE 1 MG: 1 TABLET ORAL at 16:50

## 2020-01-01 RX ADMIN — CLOPIDOGREL BISULFATE 75 MG: 75 TABLET ORAL at 05:06

## 2020-01-01 RX ADMIN — CLOPIDOGREL BISULFATE 75 MG: 75 TABLET ORAL at 13:30

## 2020-01-01 RX ADMIN — DOCUSATE SODIUM 50 MG AND SENNOSIDES 8.6 MG 2 TABLET: 8.6; 5 TABLET, FILM COATED ORAL at 16:52

## 2020-01-01 RX ADMIN — AMIODARONE HYDROCHLORIDE 0.5 MG/MIN: 1.8 INJECTION, SOLUTION INTRAVENOUS at 00:37

## 2020-01-01 RX ADMIN — DEXMEDETOMIDINE HYDROCHLORIDE 1.5 MCG/KG/HR: 100 INJECTION, SOLUTION INTRAVENOUS at 03:49

## 2020-01-01 RX ADMIN — AMPICILLIN AND SULBACTAM 3 G: 2; 1 INJECTION, POWDER, FOR SOLUTION INTRAVENOUS at 11:31

## 2020-01-01 RX ADMIN — NITROGLYCERIN 10 ML: 20 INJECTION INTRAVENOUS at 21:44

## 2020-01-01 RX ADMIN — FENTANYL CITRATE 100 MCG: 50 INJECTION INTRAMUSCULAR; INTRAVENOUS at 13:33

## 2020-01-01 RX ADMIN — LIDOCAINE HYDROCHLORIDE: 20 INJECTION, SOLUTION INFILTRATION; PERINEURAL at 21:44

## 2020-01-01 RX ADMIN — AMPICILLIN AND SULBACTAM 3 G: 2; 1 INJECTION, POWDER, FOR SOLUTION INTRAVENOUS at 04:59

## 2020-01-01 RX ADMIN — CLOPIDOGREL BISULFATE 75 MG: 75 TABLET ORAL at 06:04

## 2020-01-01 RX ADMIN — BISACODYL 10 MG: 10 SUPPOSITORY RECTAL at 17:35

## 2020-01-01 RX ADMIN — DOCUSATE SODIUM 50 MG AND SENNOSIDES 8.6 MG 2 TABLET: 8.6; 5 TABLET, FILM COATED ORAL at 05:45

## 2020-01-01 RX ADMIN — FENTANYL CITRATE 100 MCG: 50 INJECTION INTRAMUSCULAR; INTRAVENOUS at 11:50

## 2020-01-01 RX ADMIN — CALCIUM CHLORIDE 1000 MG: 100 INJECTION, SOLUTION INTRAVENOUS at 19:56

## 2020-01-01 RX ADMIN — LORAZEPAM 2 MG: 2 INJECTION INTRAMUSCULAR at 12:41

## 2020-01-01 RX ADMIN — CALCIUM CHLORIDE 1000 MG: 100 INJECTION, SOLUTION INTRAVENOUS at 10:20

## 2020-01-01 RX ADMIN — BUDESONIDE AND FORMOTEROL FUMARATE DIHYDRATE 2 PUFF: 80; 4.5 AEROSOL RESPIRATORY (INHALATION) at 18:31

## 2020-01-01 RX ADMIN — DOPAMINE HYDROCHLORIDE 5 MCG/KG/MIN: 160 INJECTION, SOLUTION INTRAVENOUS at 22:03

## 2020-01-01 RX ADMIN — IPRATROPIUM BROMIDE AND ALBUTEROL SULFATE 3 ML: .5; 3 SOLUTION RESPIRATORY (INHALATION) at 22:40

## 2020-01-01 RX ADMIN — FENTANYL CITRATE 100 MCG: 50 INJECTION INTRAMUSCULAR; INTRAVENOUS at 14:38

## 2020-01-01 RX ADMIN — MIDAZOLAM HYDROCHLORIDE 2 MG: 1 INJECTION, SOLUTION INTRAMUSCULAR; INTRAVENOUS at 21:44

## 2020-01-01 RX ADMIN — DOCUSATE SODIUM 50 MG AND SENNOSIDES 8.6 MG 2 TABLET: 8.6; 5 TABLET, FILM COATED ORAL at 05:27

## 2020-01-01 RX ADMIN — PROTAMINE SULFATE 10 MG: 10 INJECTION, SOLUTION INTRAVENOUS at 02:50

## 2020-01-01 RX ADMIN — FENTANYL CITRATE 200 MCG: 50 INJECTION INTRAMUSCULAR; INTRAVENOUS at 06:00

## 2020-01-01 RX ADMIN — DOCUSATE SODIUM 50 MG AND SENNOSIDES 8.6 MG 2 TABLET: 8.6; 5 TABLET, FILM COATED ORAL at 06:04

## 2020-01-01 RX ADMIN — DOCUSATE SODIUM 50 MG AND SENNOSIDES 8.6 MG 2 TABLET: 8.6; 5 TABLET, FILM COATED ORAL at 05:21

## 2020-01-01 RX ADMIN — IPRATROPIUM BROMIDE AND ALBUTEROL SULFATE 3 ML: .5; 3 SOLUTION RESPIRATORY (INHALATION) at 19:24

## 2020-01-01 RX ADMIN — EPINEPHRINE 1 MG: 0.1 INJECTION, SOLUTION ENDOTRACHEAL; INTRACARDIAC; INTRAVENOUS at 01:20

## 2020-01-01 RX ADMIN — CLOPIDOGREL BISULFATE 75 MG: 75 TABLET ORAL at 06:12

## 2020-01-01 RX ADMIN — METOPROLOL TARTRATE 25 MG: 25 TABLET, FILM COATED ORAL at 13:30

## 2020-01-01 RX ADMIN — FAMOTIDINE 20 MG: 20 TABLET, FILM COATED ORAL at 17:42

## 2020-01-01 RX ADMIN — METOPROLOL TARTRATE 25 MG: 25 TABLET, FILM COATED ORAL at 16:50

## 2020-01-01 RX ADMIN — ASPIRIN 81 MG 81 MG: 81 TABLET ORAL at 05:27

## 2020-01-01 RX ADMIN — SODIUM CHLORIDE: 9 INJECTION, SOLUTION INTRAVENOUS at 23:00

## 2020-01-01 RX ADMIN — ASPIRIN 81 MG 81 MG: 81 TABLET ORAL at 13:31

## 2020-01-01 RX ADMIN — IPRATROPIUM BROMIDE AND ALBUTEROL SULFATE 3 ML: .5; 3 SOLUTION RESPIRATORY (INHALATION) at 14:12

## 2020-01-01 RX ADMIN — APIXABAN 5 MG: 5 TABLET, FILM COATED ORAL at 16:50

## 2020-01-01 RX ADMIN — HYDROCORTISONE SODIUM SUCCINATE 50 MG: 100 INJECTION, POWDER, FOR SOLUTION INTRAMUSCULAR; INTRAVENOUS at 15:28

## 2020-01-01 RX ADMIN — IPRATROPIUM BROMIDE AND ALBUTEROL SULFATE 3 ML: .5; 3 SOLUTION RESPIRATORY (INHALATION) at 13:44

## 2020-01-01 RX ADMIN — IPRATROPIUM BROMIDE AND ALBUTEROL SULFATE 3 ML: .5; 3 SOLUTION RESPIRATORY (INHALATION) at 18:29

## 2020-01-01 RX ADMIN — CLOPIDOGREL BISULFATE 300 MG: 300 TABLET, FILM COATED ORAL at 20:04

## 2020-01-01 RX ADMIN — Medication 1000 MCG: at 01:20

## 2020-01-01 RX ADMIN — Medication 150 MCG/HR: at 04:32

## 2020-01-01 RX ADMIN — ASPIRIN 81 MG 81 MG: 81 TABLET ORAL at 05:06

## 2020-01-01 RX ADMIN — IPRATROPIUM BROMIDE AND ALBUTEROL SULFATE 3 ML: .5; 3 SOLUTION RESPIRATORY (INHALATION) at 19:08

## 2020-01-01 RX ADMIN — IPRATROPIUM BROMIDE AND ALBUTEROL SULFATE 3 ML: .5; 3 SOLUTION RESPIRATORY (INHALATION) at 02:02

## 2020-01-01 RX ADMIN — ACETAMINOPHEN 650 MG: 325 TABLET, FILM COATED ORAL at 14:26

## 2020-01-01 RX ADMIN — AMPICILLIN AND SULBACTAM 3 G: 2; 1 INJECTION, POWDER, FOR SOLUTION INTRAVENOUS at 05:06

## 2020-01-01 RX ADMIN — RISPERIDONE 1 MG: 1 TABLET ORAL at 04:59

## 2020-01-01 RX ADMIN — IPRATROPIUM BROMIDE AND ALBUTEROL SULFATE 3 ML: .5; 3 SOLUTION RESPIRATORY (INHALATION) at 06:25

## 2020-01-01 RX ADMIN — Medication 150 MCG/HR: at 10:42

## 2020-01-01 RX ADMIN — FENTANYL CITRATE 100 MCG: 50 INJECTION INTRAMUSCULAR; INTRAVENOUS at 21:44

## 2020-01-01 RX ADMIN — FAMOTIDINE 20 MG: 10 INJECTION, SOLUTION INTRAVENOUS at 06:12

## 2020-01-01 RX ADMIN — FENTANYL CITRATE 100 MCG: 50 INJECTION INTRAMUSCULAR; INTRAVENOUS at 23:38

## 2020-01-01 RX ADMIN — AMPICILLIN AND SULBACTAM 3 G: 2; 1 INJECTION, POWDER, FOR SOLUTION INTRAVENOUS at 22:51

## 2020-01-01 RX ADMIN — IPRATROPIUM BROMIDE AND ALBUTEROL SULFATE 3 ML: .5; 3 SOLUTION RESPIRATORY (INHALATION) at 02:40

## 2020-01-01 RX ADMIN — Medication 300 MCG/HR: at 22:46

## 2020-01-01 RX ADMIN — TRANEXAMIC ACID 1000 MG: 100 INJECTION, SOLUTION INTRAVENOUS at 01:03

## 2020-01-01 RX ADMIN — APIXABAN 5 MG: 5 TABLET, FILM COATED ORAL at 09:45

## 2020-01-01 RX ADMIN — IPRATROPIUM BROMIDE AND ALBUTEROL SULFATE 3 ML: .5; 3 SOLUTION RESPIRATORY (INHALATION) at 06:26

## 2020-01-01 RX ADMIN — DOCUSATE SODIUM 50 MG AND SENNOSIDES 8.6 MG 2 TABLET: 8.6; 5 TABLET, FILM COATED ORAL at 17:33

## 2020-01-01 RX ADMIN — METOPROLOL TARTRATE 25 MG: 25 TABLET, FILM COATED ORAL at 07:37

## 2020-01-01 RX ADMIN — DOCUSATE SODIUM 50 MG AND SENNOSIDES 8.6 MG 2 TABLET: 8.6; 5 TABLET, FILM COATED ORAL at 17:48

## 2020-01-01 RX ADMIN — MORPHINE SULFATE 5 MG: 10 INJECTION INTRAVENOUS at 06:18

## 2020-01-01 RX ADMIN — BUDESONIDE AND FORMOTEROL FUMARATE DIHYDRATE 2 PUFF: 80; 4.5 AEROSOL RESPIRATORY (INHALATION) at 06:28

## 2020-01-01 RX ADMIN — BUDESONIDE AND FORMOTEROL FUMARATE DIHYDRATE 2 PUFF: 80; 4.5 AEROSOL RESPIRATORY (INHALATION) at 18:49

## 2020-01-01 RX ADMIN — DEXMEDETOMIDINE HYDROCHLORIDE 0.2 MCG/KG/HR: 100 INJECTION, SOLUTION INTRAVENOUS at 23:24

## 2020-01-01 RX ADMIN — IPRATROPIUM BROMIDE AND ALBUTEROL SULFATE 3 ML: .5; 3 SOLUTION RESPIRATORY (INHALATION) at 18:49

## 2020-01-01 RX ADMIN — AMPICILLIN AND SULBACTAM 3 G: 2; 1 INJECTION, POWDER, FOR SOLUTION INTRAVENOUS at 00:46

## 2020-01-01 RX ADMIN — Medication 150 MCG/HR: at 18:55

## 2020-01-01 RX ADMIN — IPRATROPIUM BROMIDE AND ALBUTEROL SULFATE 3 ML: .5; 3 SOLUTION RESPIRATORY (INHALATION) at 22:43

## 2020-01-01 RX ADMIN — FAMOTIDINE 20 MG: 20 TABLET, FILM COATED ORAL at 17:33

## 2020-01-01 RX ADMIN — DOCUSATE SODIUM 50 MG AND SENNOSIDES 8.6 MG 2 TABLET: 8.6; 5 TABLET, FILM COATED ORAL at 05:06

## 2020-01-01 RX ADMIN — LABETALOL HYDROCHLORIDE 10 MG: 5 INJECTION, SOLUTION INTRAVENOUS at 20:16

## 2020-01-01 RX ADMIN — HYDROCORTISONE SODIUM SUCCINATE 50 MG: 100 INJECTION, POWDER, FOR SOLUTION INTRAMUSCULAR; INTRAVENOUS at 00:09

## 2020-01-01 RX ADMIN — AMPICILLIN AND SULBACTAM 3 G: 2; 1 INJECTION, POWDER, FOR SOLUTION INTRAVENOUS at 17:53

## 2020-01-01 RX ADMIN — DEXMEDETOMIDINE HYDROCHLORIDE 0.4 MCG/KG/HR: 100 INJECTION, SOLUTION INTRAVENOUS at 08:29

## 2020-01-01 RX ADMIN — APIXABAN 5 MG: 5 TABLET, FILM COATED ORAL at 09:36

## 2020-01-01 RX ADMIN — HYDROCORTISONE SODIUM SUCCINATE 50 MG: 100 INJECTION, POWDER, FOR SOLUTION INTRAMUSCULAR; INTRAVENOUS at 07:54

## 2020-01-01 RX ADMIN — NOREPINEPHRINE BITARTRATE 16 MCG/MIN: 1 INJECTION INTRAVENOUS at 13:30

## 2020-01-01 RX ADMIN — RISPERIDONE 1 MG: 1 TABLET ORAL at 09:54

## 2020-01-01 RX ADMIN — IPRATROPIUM BROMIDE AND ALBUTEROL SULFATE 3 ML: .5; 3 SOLUTION RESPIRATORY (INHALATION) at 23:08

## 2020-01-01 RX ADMIN — AMPICILLIN AND SULBACTAM 3 G: 2; 1 INJECTION, POWDER, FOR SOLUTION INTRAVENOUS at 17:34

## 2020-01-01 RX ADMIN — AMPICILLIN AND SULBACTAM 3 G: 2; 1 INJECTION, POWDER, FOR SOLUTION INTRAVENOUS at 00:09

## 2020-01-01 RX ADMIN — DEXMEDETOMIDINE HYDROCHLORIDE 1.5 MCG/KG/HR: 100 INJECTION, SOLUTION INTRAVENOUS at 22:46

## 2020-01-01 RX ADMIN — DOCUSATE SODIUM 50 MG AND SENNOSIDES 8.6 MG 2 TABLET: 8.6; 5 TABLET, FILM COATED ORAL at 17:27

## 2020-01-01 RX ADMIN — FENTANYL CITRATE 200 MCG: 50 INJECTION INTRAMUSCULAR; INTRAVENOUS at 09:04

## 2020-01-01 RX ADMIN — SODIUM PHOSPHATE, MONOBASIC, MONOHYDRATE 15 MMOL: 276; 142 INJECTION, SOLUTION INTRAVENOUS at 07:51

## 2020-01-01 RX ADMIN — DIBASIC SODIUM PHOSPHATE, MONOBASIC POTASSIUM PHOSPHATE AND MONOBASIC SODIUM PHOSPHATE 250 MG: 852; 155; 130 TABLET ORAL at 11:34

## 2020-01-01 RX ADMIN — AMIODARONE HYDROCHLORIDE 1 MG/MIN: 1.8 INJECTION, SOLUTION INTRAVENOUS at 19:37

## 2020-01-01 RX ADMIN — IPRATROPIUM BROMIDE AND ALBUTEROL SULFATE 3 ML: .5; 3 SOLUTION RESPIRATORY (INHALATION) at 06:11

## 2020-01-01 RX ADMIN — ATORVASTATIN CALCIUM 40 MG: 40 TABLET, FILM COATED ORAL at 17:33

## 2020-01-01 RX ADMIN — HYDROCORTISONE SODIUM SUCCINATE 50 MG: 100 INJECTION, POWDER, FOR SOLUTION INTRAMUSCULAR; INTRAVENOUS at 12:41

## 2020-01-01 RX ADMIN — INSULIN HUMAN 1 UNITS: 100 INJECTION, SOLUTION PARENTERAL at 00:31

## 2020-01-01 RX ADMIN — APIXABAN 5 MG: 5 TABLET, FILM COATED ORAL at 21:50

## 2020-01-01 RX ADMIN — HYDROCORTISONE SODIUM SUCCINATE 50 MG: 100 INJECTION, POWDER, FOR SOLUTION INTRAMUSCULAR; INTRAVENOUS at 15:13

## 2020-01-01 RX ADMIN — LISINOPRIL 2.5 MG: 5 TABLET ORAL at 06:45

## 2020-01-01 RX ADMIN — DEXMEDETOMIDINE HYDROCHLORIDE 0.5 MCG/KG/HR: 100 INJECTION, SOLUTION INTRAVENOUS at 15:07

## 2020-01-01 RX ADMIN — POTASSIUM BICARBONATE 50 MEQ: 978 TABLET, EFFERVESCENT ORAL at 07:40

## 2020-01-01 RX ADMIN — IPRATROPIUM BROMIDE AND ALBUTEROL SULFATE 3 ML: .5; 3 SOLUTION RESPIRATORY (INHALATION) at 19:00

## 2020-01-01 RX ADMIN — FUROSEMIDE 20 MG: 10 INJECTION, SOLUTION INTRAMUSCULAR; INTRAVENOUS at 05:01

## 2020-01-01 RX ADMIN — AMIODARONE HYDROCHLORIDE 1 MG/MIN: 1.8 INJECTION, SOLUTION INTRAVENOUS at 01:00

## 2020-01-01 RX ADMIN — HEPARIN SODIUM: 1000 INJECTION, SOLUTION INTRAVENOUS; SUBCUTANEOUS at 22:18

## 2020-01-01 RX ADMIN — FAMOTIDINE 20 MG: 10 INJECTION, SOLUTION INTRAVENOUS at 05:27

## 2020-01-01 RX ADMIN — SODIUM PHOSPHATE 133 ML: 7; 19 ENEMA RECTAL at 19:30

## 2020-01-01 RX ADMIN — AMPICILLIN AND SULBACTAM 3 G: 2; 1 INJECTION, POWDER, FOR SOLUTION INTRAVENOUS at 11:55

## 2020-01-01 RX ADMIN — DEXMEDETOMIDINE HYDROCHLORIDE 1 MCG/KG/HR: 100 INJECTION, SOLUTION INTRAVENOUS at 21:01

## 2020-01-01 RX ADMIN — ATORVASTATIN CALCIUM 40 MG: 40 TABLET, FILM COATED ORAL at 16:52

## 2020-01-01 RX ADMIN — AMPICILLIN AND SULBACTAM 3 G: 2; 1 INJECTION, POWDER, FOR SOLUTION INTRAVENOUS at 06:00

## 2020-01-01 RX ADMIN — POTASSIUM BICARBONATE 50 MEQ: 978 TABLET, EFFERVESCENT ORAL at 05:41

## 2020-01-01 RX ADMIN — FAMOTIDINE 20 MG: 20 TABLET, FILM COATED ORAL at 05:46

## 2020-01-01 RX ADMIN — FENTANYL CITRATE 200 MCG: 50 INJECTION INTRAMUSCULAR; INTRAVENOUS at 13:56

## 2020-01-01 RX ADMIN — APIXABAN 5 MG: 5 TABLET, FILM COATED ORAL at 13:29

## 2020-01-01 RX ADMIN — IPRATROPIUM BROMIDE AND ALBUTEROL SULFATE 3 ML: .5; 3 SOLUTION RESPIRATORY (INHALATION) at 19:38

## 2020-01-01 RX ADMIN — IPRATROPIUM BROMIDE AND ALBUTEROL SULFATE 3 ML: .5; 3 SOLUTION RESPIRATORY (INHALATION) at 01:20

## 2020-01-01 RX ADMIN — POTASSIUM BICARBONATE 25 MEQ: 978 TABLET, EFFERVESCENT ORAL at 06:04

## 2020-01-01 RX ADMIN — POLYETHYLENE GLYCOL 3350 1 PACKET: 17 POWDER, FOR SOLUTION ORAL at 17:25

## 2020-01-01 RX ADMIN — POTASSIUM PHOSPHATE, MONOBASIC AND POTASSIUM PHOSPHATE, DIBASIC 15 MMOL: 224; 236 INJECTION, SOLUTION, CONCENTRATE INTRAVENOUS at 16:35

## 2020-01-01 RX ADMIN — LIDOCAINE HYDROCHLORIDE 1 APPLICATION: 20 JELLY TOPICAL at 12:36

## 2020-01-01 RX ADMIN — FENTANYL CITRATE 100 MCG: 50 INJECTION INTRAMUSCULAR; INTRAVENOUS at 08:19

## 2020-01-01 RX ADMIN — AMIODARONE HYDROCHLORIDE 150 MG: 1.5 INJECTION, SOLUTION INTRAVENOUS at 09:54

## 2020-01-01 RX ADMIN — LABETALOL HYDROCHLORIDE 10 MG: 5 INJECTION, SOLUTION INTRAVENOUS at 10:08

## 2020-01-01 RX ADMIN — IPRATROPIUM BROMIDE AND ALBUTEROL SULFATE 3 ML: .5; 3 SOLUTION RESPIRATORY (INHALATION) at 23:02

## 2020-01-01 RX ADMIN — AMIODARONE HYDROCHLORIDE 400 MG: 200 TABLET ORAL at 16:50

## 2020-01-01 RX ADMIN — IPRATROPIUM BROMIDE AND ALBUTEROL SULFATE 3 ML: .5; 3 SOLUTION RESPIRATORY (INHALATION) at 15:04

## 2020-01-01 RX ADMIN — CLOPIDOGREL BISULFATE 75 MG: 75 TABLET ORAL at 05:21

## 2020-01-01 RX ADMIN — FAMOTIDINE 20 MG: 20 TABLET, FILM COATED ORAL at 17:48

## 2020-01-01 RX ADMIN — AMIODARONE HYDROCHLORIDE 1 MG/MIN: 1.8 INJECTION, SOLUTION INTRAVENOUS at 19:54

## 2020-01-01 RX ADMIN — FENTANYL CITRATE 200 MCG: 50 INJECTION INTRAMUSCULAR; INTRAVENOUS at 17:03

## 2020-01-01 RX ADMIN — ASPIRIN 81 MG 81 MG: 81 TABLET ORAL at 05:21

## 2020-01-01 RX ADMIN — IPRATROPIUM BROMIDE AND ALBUTEROL SULFATE 3 ML: .5; 3 SOLUTION RESPIRATORY (INHALATION) at 09:01

## 2020-01-01 RX ADMIN — DIBASIC SODIUM PHOSPHATE, MONOBASIC POTASSIUM PHOSPHATE AND MONOBASIC SODIUM PHOSPHATE 250 MG: 852; 155; 130 TABLET ORAL at 17:41

## 2020-01-01 RX ADMIN — IPRATROPIUM BROMIDE AND ALBUTEROL SULFATE 3 ML: .5; 3 SOLUTION RESPIRATORY (INHALATION) at 10:01

## 2020-01-01 RX ADMIN — LISINOPRIL 2.5 MG: 5 TABLET ORAL at 07:35

## 2020-01-01 RX ADMIN — POLYETHYLENE GLYCOL 3350 1 PACKET: 17 POWDER, FOR SOLUTION ORAL at 17:29

## 2020-01-01 RX ADMIN — SODIUM CHLORIDE 500 ML: 9 INJECTION, SOLUTION INTRAVENOUS at 02:48

## 2020-01-01 RX ADMIN — AMPICILLIN AND SULBACTAM 3 G: 2; 1 INJECTION, POWDER, FOR SOLUTION INTRAVENOUS at 12:00

## 2020-01-01 RX ADMIN — LABETALOL HYDROCHLORIDE 10 MG: 5 INJECTION, SOLUTION INTRAVENOUS at 17:04

## 2020-01-01 RX ADMIN — ATORVASTATIN CALCIUM 40 MG: 40 TABLET, FILM COATED ORAL at 17:11

## 2020-01-01 RX ADMIN — SODIUM CHLORIDE 500 ML: 9 INJECTION, SOLUTION INTRAVENOUS at 11:46

## 2020-01-01 RX ADMIN — EPINEPHRINE 8 MCG/MIN: 1 INJECTION INTRAMUSCULAR; INTRAVENOUS; SUBCUTANEOUS at 01:13

## 2020-01-01 RX ADMIN — METOPROLOL TARTRATE 25 MG: 25 TABLET, FILM COATED ORAL at 06:15

## 2020-01-01 RX ADMIN — AMPICILLIN AND SULBACTAM 3 G: 2; 1 INJECTION, POWDER, FOR SOLUTION INTRAVENOUS at 12:42

## 2020-01-01 RX ADMIN — POLYETHYLENE GLYCOL 3350 1 PACKET: 17 POWDER, FOR SOLUTION ORAL at 10:15

## 2020-01-01 RX ADMIN — IPRATROPIUM BROMIDE AND ALBUTEROL SULFATE 3 ML: .5; 3 SOLUTION RESPIRATORY (INHALATION) at 03:04

## 2020-01-01 RX ADMIN — ASPIRIN 81 MG 81 MG: 81 TABLET ORAL at 05:01

## 2020-01-01 RX ADMIN — FENTANYL CITRATE 100 MCG: 50 INJECTION INTRAMUSCULAR; INTRAVENOUS at 12:40

## 2020-01-01 RX ADMIN — NOREPINEPHRINE BITARTRATE 10 MCG/MIN: 1 INJECTION INTRAVENOUS at 05:26

## 2020-01-01 RX ADMIN — IPRATROPIUM BROMIDE AND ALBUTEROL SULFATE 3 ML: .5; 3 SOLUTION RESPIRATORY (INHALATION) at 02:38

## 2020-01-01 RX ADMIN — AMPICILLIN AND SULBACTAM 3 G: 2; 1 INJECTION, POWDER, FOR SOLUTION INTRAVENOUS at 11:46

## 2020-01-01 RX ADMIN — ATORVASTATIN CALCIUM 40 MG: 40 TABLET, FILM COATED ORAL at 17:41

## 2020-01-01 RX ADMIN — SODIUM BICARBONATE: 84 INJECTION, SOLUTION INTRAVENOUS at 23:24

## 2020-01-01 RX ADMIN — FAMOTIDINE 20 MG: 20 TABLET, FILM COATED ORAL at 05:21

## 2020-01-01 RX ADMIN — AMPICILLIN AND SULBACTAM 3 G: 2; 1 INJECTION, POWDER, FOR SOLUTION INTRAVENOUS at 05:21

## 2020-01-01 RX ADMIN — POTASSIUM BICARBONATE 25 MEQ: 978 TABLET, EFFERVESCENT ORAL at 07:56

## 2020-01-01 RX ADMIN — IPRATROPIUM BROMIDE AND ALBUTEROL SULFATE 3 ML: .5; 3 SOLUTION RESPIRATORY (INHALATION) at 10:20

## 2020-01-01 RX ADMIN — IPRATROPIUM BROMIDE AND ALBUTEROL SULFATE 3 ML: .5; 3 SOLUTION RESPIRATORY (INHALATION) at 22:34

## 2020-01-01 RX ADMIN — IPRATROPIUM BROMIDE AND ALBUTEROL SULFATE 3 ML: .5; 3 SOLUTION RESPIRATORY (INHALATION) at 19:18

## 2020-01-01 RX ADMIN — AMPICILLIN AND SULBACTAM 3 G: 2; 1 INJECTION, POWDER, FOR SOLUTION INTRAVENOUS at 05:27

## 2020-01-01 RX ADMIN — AMPICILLIN AND SULBACTAM 3 G: 2; 1 INJECTION, POWDER, FOR SOLUTION INTRAVENOUS at 01:02

## 2020-01-01 RX ADMIN — VERAPAMIL HYDROCHLORIDE 5 MG: 2.5 INJECTION, SOLUTION INTRAVENOUS at 21:45

## 2020-01-01 RX ADMIN — METOPROLOL TARTRATE 25 MG: 25 TABLET, FILM COATED ORAL at 17:23

## 2020-01-01 RX ADMIN — HYDROCORTISONE SODIUM SUCCINATE 50 MG: 100 INJECTION, POWDER, FOR SOLUTION INTRAMUSCULAR; INTRAVENOUS at 06:01

## 2020-01-01 RX ADMIN — POTASSIUM CHLORIDE 40 MEQ: 29.8 INJECTION, SOLUTION INTRAVENOUS at 05:42

## 2020-01-01 RX ADMIN — POTASSIUM PHOSPHATE, MONOBASIC AND POTASSIUM PHOSPHATE, DIBASIC 30 MMOL: 224; 236 INJECTION, SOLUTION, CONCENTRATE INTRAVENOUS at 07:48

## 2020-01-01 RX ADMIN — METOPROLOL TARTRATE 25 MG: 25 TABLET, FILM COATED ORAL at 16:52

## 2020-01-01 RX ADMIN — IPRATROPIUM BROMIDE AND ALBUTEROL SULFATE 3 ML: .5; 3 SOLUTION RESPIRATORY (INHALATION) at 11:37

## 2020-01-01 RX ADMIN — AMPICILLIN AND SULBACTAM 3 G: 2; 1 INJECTION, POWDER, FOR SOLUTION INTRAVENOUS at 17:11

## 2020-01-01 RX ADMIN — AMIODARONE HYDROCHLORIDE 1 MG/MIN: 1.8 INJECTION, SOLUTION INTRAVENOUS at 15:31

## 2020-01-01 RX ADMIN — DEXMEDETOMIDINE HYDROCHLORIDE 0.2 MCG/KG/HR: 100 INJECTION, SOLUTION INTRAVENOUS at 17:48

## 2020-01-01 RX ADMIN — DOPAMINE HYDROCHLORIDE 10 MCG/KG/MIN: 160 INJECTION, SOLUTION INTRAVENOUS at 23:46

## 2020-01-01 RX ADMIN — METOPROLOL TARTRATE 25 MG: 25 TABLET, FILM COATED ORAL at 04:59

## 2020-01-01 RX ADMIN — IPRATROPIUM BROMIDE AND ALBUTEROL SULFATE 3 ML: .5; 3 SOLUTION RESPIRATORY (INHALATION) at 23:10

## 2020-01-01 RX ADMIN — IPRATROPIUM BROMIDE AND ALBUTEROL SULFATE 3 ML: .5; 3 SOLUTION RESPIRATORY (INHALATION) at 10:00

## 2020-01-01 RX ADMIN — IPRATROPIUM BROMIDE AND ALBUTEROL SULFATE 3 ML: .5; 3 SOLUTION RESPIRATORY (INHALATION) at 11:19

## 2020-01-01 RX ADMIN — FENTANYL CITRATE 100 MCG: 50 INJECTION INTRAMUSCULAR; INTRAVENOUS at 17:11

## 2020-01-01 RX ADMIN — ATORVASTATIN CALCIUM 40 MG: 40 TABLET, FILM COATED ORAL at 17:48

## 2020-01-01 RX ADMIN — AMPICILLIN AND SULBACTAM 3 G: 2; 1 INJECTION, POWDER, FOR SOLUTION INTRAVENOUS at 23:46

## 2020-01-01 RX ADMIN — IPRATROPIUM BROMIDE AND ALBUTEROL SULFATE 3 ML: .5; 3 SOLUTION RESPIRATORY (INHALATION) at 14:50

## 2020-01-01 RX ADMIN — AMIODARONE HYDROCHLORIDE 0.5 MG/MIN: 1.8 INJECTION, SOLUTION INTRAVENOUS at 13:06

## 2020-01-01 RX ADMIN — AMPICILLIN AND SULBACTAM 3 G: 2; 1 INJECTION, POWDER, FOR SOLUTION INTRAVENOUS at 16:51

## 2020-01-01 RX ADMIN — INSULIN HUMAN 1 UNITS: 100 INJECTION, SOLUTION PARENTERAL at 06:13

## 2020-01-01 RX ADMIN — DOCUSATE SODIUM 50 MG AND SENNOSIDES 8.6 MG 2 TABLET: 8.6; 5 TABLET, FILM COATED ORAL at 05:01

## 2020-01-01 RX ADMIN — AMPICILLIN AND SULBACTAM 3 G: 2; 1 INJECTION, POWDER, FOR SOLUTION INTRAVENOUS at 17:33

## 2020-01-01 RX ADMIN — DEXMEDETOMIDINE HYDROCHLORIDE 0.6 MCG/KG/HR: 100 INJECTION, SOLUTION INTRAVENOUS at 00:34

## 2020-01-01 RX ADMIN — HYDROCORTISONE SODIUM SUCCINATE 50 MG: 100 INJECTION, POWDER, FOR SOLUTION INTRAMUSCULAR; INTRAVENOUS at 04:59

## 2020-01-01 RX ADMIN — POTASSIUM BICARBONATE 50 MEQ: 978 TABLET, EFFERVESCENT ORAL at 17:41

## 2020-01-01 RX ADMIN — CLOPIDOGREL BISULFATE 75 MG: 75 TABLET ORAL at 05:27

## 2020-01-01 RX ADMIN — IPRATROPIUM BROMIDE AND ALBUTEROL SULFATE 3 ML: .5; 3 SOLUTION RESPIRATORY (INHALATION) at 11:13

## 2020-01-01 RX ADMIN — DEXMEDETOMIDINE HYDROCHLORIDE 0.6 MCG/KG/HR: 100 INJECTION, SOLUTION INTRAVENOUS at 15:19

## 2020-01-01 RX ADMIN — APIXABAN 5 MG: 5 TABLET, FILM COATED ORAL at 04:58

## 2020-01-01 RX ADMIN — CLOPIDOGREL BISULFATE 75 MG: 75 TABLET ORAL at 05:46

## 2020-01-01 RX ADMIN — FENTANYL CITRATE 100 MCG: 50 INJECTION INTRAMUSCULAR; INTRAVENOUS at 07:36

## 2020-01-01 RX ADMIN — AMPICILLIN AND SULBACTAM 3 G: 2; 1 INJECTION, POWDER, FOR SOLUTION INTRAVENOUS at 17:50

## 2020-01-01 RX ADMIN — FAMOTIDINE 20 MG: 20 TABLET, FILM COATED ORAL at 17:11

## 2020-01-01 RX ADMIN — DEXMEDETOMIDINE HYDROCHLORIDE 1 MCG/KG/HR: 100 INJECTION, SOLUTION INTRAVENOUS at 17:54

## 2020-01-01 RX ADMIN — AMIODARONE HYDROCHLORIDE 400 MG: 200 TABLET ORAL at 04:59

## 2020-01-01 RX ADMIN — BUDESONIDE AND FORMOTEROL FUMARATE DIHYDRATE 2 PUFF: 80; 4.5 AEROSOL RESPIRATORY (INHALATION) at 19:46

## 2020-01-01 RX ADMIN — CLOPIDOGREL BISULFATE 75 MG: 75 TABLET ORAL at 04:59

## 2020-01-01 RX ADMIN — DEXTROSE MONOHYDRATE: 50 INJECTION, SOLUTION INTRAVENOUS at 10:20

## 2020-01-01 RX ADMIN — Medication 100 MCG/HR: at 00:00

## 2020-01-01 RX ADMIN — IPRATROPIUM BROMIDE AND ALBUTEROL SULFATE 3 ML: .5; 3 SOLUTION RESPIRATORY (INHALATION) at 18:16

## 2020-01-01 RX ADMIN — Medication 150 MCG/HR: at 01:59

## 2020-01-01 ASSESSMENT — COGNITIVE AND FUNCTIONAL STATUS - GENERAL
TURNING FROM BACK TO SIDE WHILE IN FLAT BAD: UNABLE
MOBILITY SCORE: 6
SUGGESTED CMS G CODE MODIFIER MOBILITY: CN
HELP NEEDED FOR BATHING: A LOT
CLIMB 3 TO 5 STEPS WITH RAILING: TOTAL
DAILY ACTIVITIY SCORE: 12
WALKING IN HOSPITAL ROOM: TOTAL
WALKING IN HOSPITAL ROOM: TOTAL
SUGGESTED CMS G CODE MODIFIER DAILY ACTIVITY: CN
CLIMB 3 TO 5 STEPS WITH RAILING: TOTAL
STANDING UP FROM CHAIR USING ARMS: TOTAL
SUGGESTED CMS G CODE MODIFIER MOBILITY: CN
DRESSING REGULAR LOWER BODY CLOTHING: A LOT
HELP NEEDED FOR BATHING: TOTAL
PERSONAL GROOMING: TOTAL
EATING MEALS: A LOT
PERSONAL GROOMING: A LOT
DRESSING REGULAR LOWER BODY CLOTHING: TOTAL
MOVING FROM LYING ON BACK TO SITTING ON SIDE OF FLAT BED: UNABLE
STANDING UP FROM CHAIR USING ARMS: TOTAL
SUGGESTED CMS G CODE MODIFIER DAILY ACTIVITY: CL
TOILETING: TOTAL
TURNING FROM BACK TO SIDE WHILE IN FLAT BAD: UNABLE
DRESSING REGULAR UPPER BODY CLOTHING: TOTAL
TOILETING: A LOT
MOVING TO AND FROM BED TO CHAIR: UNABLE
MOVING TO AND FROM BED TO CHAIR: UNABLE
DAILY ACTIVITIY SCORE: 6
MOVING FROM LYING ON BACK TO SITTING ON SIDE OF FLAT BED: UNABLE
MOBILITY SCORE: 6
DRESSING REGULAR UPPER BODY CLOTHING: A LOT
EATING MEALS: TOTAL

## 2020-01-01 ASSESSMENT — ENCOUNTER SYMPTOMS
ABDOMINAL PAIN: 0
COUGH: 0
NAUSEA: 0
PHOTOPHOBIA: 0
FEVER: 0
BLOOD IN STOOL: 0
FOCAL WEAKNESS: 0
HEADACHES: 0
CONSTIPATION: 0
INSOMNIA: 0
TINGLING: 0
SHORTNESS OF BREATH: 0
SHORTNESS OF BREATH: 0
ORTHOPNEA: 0
COUGH: 0
DIARRHEA: 0
SINUS PAIN: 0
FOCAL WEAKNESS: 0
COUGH: 0
SHORTNESS OF BREATH: 0
BLURRED VISION: 0
DIARRHEA: 0
TINGLING: 0
SINUS PAIN: 0
CONSTIPATION: 0
FEVER: 0
DOUBLE VISION: 0
WEAKNESS: 1
PHOTOPHOBIA: 0
NERVOUS/ANXIOUS: 0
FEVER: 0
SPUTUM PRODUCTION: 1
BLURRED VISION: 0
ORTHOPNEA: 0
ORTHOPNEA: 0
NAUSEA: 0
STRIDOR: 0
SINUS PAIN: 0
STRIDOR: 0
ORTHOPNEA: 0
STRIDOR: 0
NERVOUS/ANXIOUS: 0
ABDOMINAL PAIN: 0
PHOTOPHOBIA: 0
STRIDOR: 0
SPUTUM PRODUCTION: 1
INSOMNIA: 0
NERVOUS/ANXIOUS: 0
WEAKNESS: 1
BLOOD IN STOOL: 0
SINUS PAIN: 0
WEAKNESS: 1
NERVOUS/ANXIOUS: 0
MYALGIAS: 0
ABDOMINAL PAIN: 0
DOUBLE VISION: 0
CONSTIPATION: 0
BLURRED VISION: 0
INSOMNIA: 0
BLOOD IN STOOL: 0
SHORTNESS OF BREATH: 0
WEAKNESS: 1
FOCAL WEAKNESS: 0
FEVER: 0
DOUBLE VISION: 0
TINGLING: 0
SPUTUM PRODUCTION: 1
HEADACHES: 0
PHOTOPHOBIA: 0
FOCAL WEAKNESS: 0
MYALGIAS: 0
NAUSEA: 0
TINGLING: 0
ABDOMINAL PAIN: 0
SPUTUM PRODUCTION: 1
COUGH: 0
VOMITING: 0
NAUSEA: 0
BLURRED VISION: 0
HEADACHES: 0
VOMITING: 0
DIARRHEA: 0
CONSTIPATION: 0
INSOMNIA: 0
VOMITING: 0
MYALGIAS: 0
DIARRHEA: 0
DOUBLE VISION: 0
BLOOD IN STOOL: 0
VOMITING: 0
HEADACHES: 0
MYALGIAS: 0

## 2020-01-01 ASSESSMENT — LIFESTYLE VARIABLES: EVER_SMOKED: YES

## 2020-01-01 ASSESSMENT — GAIT ASSESSMENTS: GAIT LEVEL OF ASSIST: UNABLE TO PARTICIPATE

## 2020-01-01 ASSESSMENT — FIBROSIS 4 INDEX
FIB4 SCORE: 8.83
FIB4 SCORE: 12.36
FIB4 SCORE: 9.3
FIB4 SCORE: 8.83
FIB4 SCORE: 1.55
FIB4 SCORE: 1.58
FIB4 SCORE: 6.89
FIB4 SCORE: 11.4
FIB4 SCORE: 8.08

## 2020-01-01 ASSESSMENT — CHA2DS2 SCORE
CHA2DS2 VASC SCORE: 3
HYPERTENSION: NO
VASCULAR DISEASE: YES
AGE 75 OR GREATER: NO
SEX: MALE
AGE 65 TO 74: YES
DIABETES: NO
CHF OR LEFT VENTRICULAR DYSFUNCTION: YES
PRIOR STROKE OR TIA OR THROMBOEMBOLISM: NO

## 2020-01-01 ASSESSMENT — PULMONARY FUNCTION TESTS
EPAP_CMH2O: 8
FVC: 1.2
EPAP_CMH2O: 8

## 2020-01-01 ASSESSMENT — ACTIVITIES OF DAILY LIVING (ADL): TOILETING: UNABLE TO DETERMINE AT THIS TIME

## 2020-01-01 ASSESSMENT — PATIENT HEALTH QUESTIONNAIRE - PHQ9
1. LITTLE INTEREST OR PLEASURE IN DOING THINGS: NOT AT ALL
2. FEELING DOWN, DEPRESSED, IRRITABLE, OR HOPELESS: NOT AT ALL
SUM OF ALL RESPONSES TO PHQ9 QUESTIONS 1 AND 2: 0

## 2020-08-03 PROBLEM — R73.9 HYPERGLYCEMIA: Status: ACTIVE | Noted: 2020-01-01

## 2020-08-03 PROBLEM — R04.2 HEMOPTYSIS: Status: ACTIVE | Noted: 2020-01-01

## 2020-08-03 PROBLEM — E87.20 METABOLIC ACIDOSIS: Status: ACTIVE | Noted: 2020-01-01

## 2020-08-03 PROBLEM — I21.3 ACUTE ST ELEVATION MYOCARDIAL INFARCTION (STEMI) DUE TO OCCLUSION OF RIGHT CORONARY ARTERY (HCC): Status: ACTIVE | Noted: 2020-01-01

## 2020-08-03 PROBLEM — I42.9 CARDIOMYOPATHY (HCC): Status: ACTIVE | Noted: 2020-01-01

## 2020-08-03 PROBLEM — I25.10 CORONARY ARTERY DISEASE DUE TO CALCIFIED CORONARY LESION: Status: ACTIVE | Noted: 2020-01-01

## 2020-08-03 PROBLEM — D72.829 LEUKOCYTOSIS: Status: ACTIVE | Noted: 2020-01-01

## 2020-08-03 PROBLEM — I25.84 CORONARY ARTERY DISEASE DUE TO CALCIFIED CORONARY LESION: Status: ACTIVE | Noted: 2020-01-01

## 2020-08-03 PROBLEM — I46.9 CARDIAC ARREST (HCC): Status: ACTIVE | Noted: 2020-01-01

## 2020-08-03 PROBLEM — C34.90 LUNG CANCER (HCC): Status: ACTIVE | Noted: 2020-01-01

## 2020-08-03 PROBLEM — J44.9 COPD (CHRONIC OBSTRUCTIVE PULMONARY DISEASE) (HCC): Status: ACTIVE | Noted: 2020-01-01

## 2020-08-03 PROBLEM — I49.01 VENTRICULAR FIBRILLATION (HCC): Status: ACTIVE | Noted: 2020-01-01

## 2020-08-03 PROBLEM — G93.40 ENCEPHALOPATHY ACUTE: Status: ACTIVE | Noted: 2020-01-01

## 2020-08-03 NOTE — ASSESSMENT & PLAN NOTE
Not in acute exacerbation, no wheeze present  RT/O2 Protocols  Titrate supplemental FiO2 to maintain SpO2 >92%

## 2020-08-03 NOTE — PROGRESS NOTES
Reason for consultation /admission : Inf STEMI, VF arrest in outside ER  S/p RCA stent 8/2  Residual 70% Left main and  LCX  EF 35%  Hemoptysis (s/p t-PA), remote lung CA (2005) post chemo RX    Still on pressor  Remains intubated  Awake and following command when off sedation     Bronchoscopy reportedly showed Rt main stem mass    Review of systems; difficult to obtain (intubated)    Denies chest pain  General: No fever, chills, no weakness  HENT: + sore throat, no neck pain  Eyes: No blurred vision or double vision  Heart: No palpitation,   Abdomen: No abdominal pain, no nausea vomiting  : No dysuria, no frequency or hesitancy  Musculoskeletal: No myalgia, no back pain, some joint pain  Hematology: No easy bruising  Skin: No rash or itching  Neurological: No headache, no new focal weakness or numbness  Psychological: + anxiety  All other review of systems are negative       Temp:  [33 °C (91.4 °F)-36.2 °C (97.2 °F)] 33 °C (91.4 °F)  Pulse:  [] 67  Resp:  [0-26] 18  BP: ()/(6-88) 129/66  SpO2:  [23 %-100 %] 100 %  GENERAL not in acute distress, not dyspnic at rest, intubated  Head atraumatic, normocephalic  Eyes EOMI  ENT neck supple, no JVD, no carotid bruits or thyromegaly  Lung good expansion, distant sound, no rales or wheezing  Heart RRR, normal rate, no murmur,   no gallop or rub  Abd soft, no tenderness, mass or bruits  Ext no edema  Skin no ecchymosis or petechiae  Musculoskeletal no deformity  Neuro grossly intact  Psych normal mood, normal affect    Monitor reviewed by me showed frequent PVC, SR, s-ja, lat ST elevation.    Labs: Cr 1.3, K+ 4    Hb down to 8 last night upto 13 after 2 units?    Tn this AM 3931    Assessment and plans    1. Inf STEMI D#2 s/p RCA stent  still viktor vasopressor but at lower dose  No sig arrhythmias  EF 35%  On DAPT  Has residual  prox LCX and around 70% eccentric calcified left main which may nolt be suitable for PCI and require CABG if revascularization  deems appropriate depend on lung mass issue  Residual CAD disease likely could be managed medically for now    2. Hemoptysis/lung mass and respiratory failure  Per primary  Hemoptysis appears to have stopped  Would prefer to keep DAPT for now     3. OOH VF arrest  Appear to have no major neurological deficit    4. Anemia likely due to #2  Improved post transfusion  per priamry    Will follow    Please note that this dictation was created using voice recognition software. I have worked with consultants from the vendor as well as technical experts from [facility] to optimize the interface. I have made every reasonable attempt to correct obvious errors, but I expect that there are errors of grammar and possibly content I did not discover before finalizing the note

## 2020-08-03 NOTE — PROGRESS NOTES
Dr Gaitan updated on arterial line beginning to fail.  Wave form decreasing.  Aggressive flushing will allow it to work again.  Per Dr Gaitan, will place radial arterial line, can d/c fem line when placed.

## 2020-08-03 NOTE — ASSESSMENT & PLAN NOTE
Likely due to friable endobronchial mass versus tracheal trauma during intubation  Bronchoscopy not consistent with diffuse alveolar hemorrhage  Inhaled TXA  Protamine for heparin reversal  Trend hemoglobin, transfuse to maintain hemoglobin greater than 8

## 2020-08-03 NOTE — PROCEDURES
Procedures  Procedure Note    Date: 8/2/2020  Time: 11:20 PM    Procedure: Bronchoscopy    Indication: Massive mopped assist, inability to pass a suction catheter, toxic respiratory failure, elevated peak pressures  Consent: Emergent    Procedure: A time-out was performed. Respiratory therapy and nursing at bedside throughout procedure. Patient provided sedation and analgesia throughout the procedure. Placed on full ventilator support with an FiO2 of 100% throughout the procedure. Using a fiberoptic bronchoscope, trachea entered via ET tube.  6 mL of local anesthetic sprayed at the vimal (2% lidocaine) achieving appropriate comfort level for patient. Airways visualized directly and the following intervention was performed: Removal of obstructing tissue, BAL, instillation of chilled saline. Findings as below. Patient tolerated procedure well without any difficulties and left in care of bedside nurse/RT.     Medications: Precedex, lidocaine    Findings: Upper airway - Not visualized as bronchoscope passed through ETT.  Large tissue obstruction at the distal tip of endotracheal tube removed using forceps and sent for pathology.  No signs of tracheal injury as source of tissue.        Trachea to vimal -inflamed appearing mucosa without lesions or mass, ETT tip measured 0.5 cm from the vimal.  Retracted under direct bronchoscopic guidance        R proximal and distal airways -bleeding, macerated appearing mucosa, large, obstructing mobile tissue mass-like structure in the proximal right mainstem; hemorrhage from right mainstem, unable to visualize exact source given mass, significant amounts of chilled saline instilled over duration of bleeding with slight slowing of hemorrhage however unable to complete stop.  Secretions: Bloody mucus.  BAL obtained from right mainstem, sent for culture, cell count and cytology.        L proximal and distal airways -normal appearing mucosa without mass/lesion/anatomic variance,  secretions: Mild to moderate bloody mucus present seen spilling from right mainstem into left lung.  Lavaged until clear effluent returned        Samples -right mainstem BAL    Complications: None apparent  CXR (if applicable): Pending    Poncho Sy DO  Critical Care Medicine

## 2020-08-03 NOTE — ASSESSMENT & PLAN NOTE
Left ventricular ejection fraction of 35%  Careful volume resuscitation  Continue beta-blocker, ACE inhibitor and diuresis as tolerated

## 2020-08-03 NOTE — PROGRESS NOTES
2252: Patient to T606 from cath lab with cath lab staff. Pt switched over from zoll to room monitor. NIBP not resulting on monitor, manual BP taken.  2300: Dr. Lynch and Dr. Sy to bedside for insertion of arterial line and CVC and bronchoscopy.  0030: Patient wife Lexx called to update, but no answer. No update provided  0115: Blood pressure rapidly dropped from 90/60s to 50/30s. Help called, more vasopressors retrieved.  0118: CPR begun, code blue initiated. Please refer to code charting

## 2020-08-03 NOTE — ASSESSMENT & PLAN NOTE
Unasyn completed  Cultures negative  Leukocytosis worsening today, chest x-ray with left lower lobe infiltrate  Start cefepime

## 2020-08-03 NOTE — ED NOTES
Unable to complete med rec  Pt unable to interview, no demographic information available at this time

## 2020-08-03 NOTE — PROCEDURES
Arterial Catheter Procedure Note    Date: 8/3/2020    Procedure:  Arterial Catheter Insertion    Indication: hypotension/shock    Physician:  Dr. Eduardo Gaitan MD    Consent:  Emergent    Procedure:  The patient is intubated and on full mechanical vent support and in shock.  Arterial catheter is indicated for continuous invasive BP monitoring to titrate vasoactive agents.  The right wrist was prepped and draped using sterile barrier precautions.  A 20g, 8cm radial artery catheter was placed in the right radial artery on the first attempt without difficulty.  A good quality arterial waveform was noted on monitor.  The catheter ws sutured in place and a sterile dressing was applied.  No immediate complications.  EBL < 5 cc.

## 2020-08-03 NOTE — ASSESSMENT & PLAN NOTE
- in 2005, S/P chemo, recent hemoptysis and friable mass on bronch raising suspicion for recurrence  - repeat bronchoscopy 8/4 with no evidence of endobronchial lesion/ mass, evident only old blood lavaged and sent for studies  - unlikely recurrence of lung cancer  - path from bronchoscopic tissue sample benign

## 2020-08-03 NOTE — PROCEDURES
INDICATION: Frequent ABG's and blood pressure monitoring  PROCEDURE : Dr. Lynch  ATTENDING PHYSICIAN: Dr. Sy In Attendance (Y)  CONSENT: Emergent Nature    PROCEDURE SUMMARY:   A time out was performed. My hands were washed immediately prior to the procedure. I wore a surgical cap, mask, sterile gown and sterile gloves throughout the procedure. The L inguinal region was prepped using chlorhexidine scrub and draped in sterile fashion using a three quarter sheet drape and sterile towels. The femoral pulse was identified. Anesthesia was achieved using 1% lidocaine. Palpating the femoral pulse throughout the procedure, the introducer needle was inserted into the femoral artery. Arterial blood was withdrawn. The syringe was removed and a guidewire was advanced through the needle into the femoral artery. The needle was exchanged over the wire for an arterial catheter. The wire was removed and the catheter was secured to the skin using a suture. The patient tolerated the procedure without any hemodynamic compromise. At time of procedure completion, the catheter was connected to the cardiac monitor and calibrated. Appropriate waveform and blood pressure tracing was observed. Estimated blood loss is ~5cc.

## 2020-08-03 NOTE — PROGRESS NOTES
"Critical Care Progress Note    Date of admission  8/2/2020    Chief Complaint  74 y.o. male admitted 8/2/2020 with cardiac arrest, STEMI, resp failure    Hospital Course    74 y.o. male with a past medical history of hypercholesterolemia, hypothyroidism, COPD, lung cancer status post chemotherapy in 2005 who presented 8/2/2020 as a transfer from [facility] where he presented today following the sudden onset of retrosternal crushing chest pain which was rated at that time as 8 out of 10 and radiating to the left arm.  He was found to have an inferior ST elevation myocardial infarction and received tenecteplase for treatment.  He subsequently had a reperfusion ventricular fibrillation which required defibrillation and 1 round of ACLS.  He was intubated with RSI at 1615, following this intubation hemoptysis was noted and was reported to the transport team as \"a liter and a half of serosanguinous fluid\".  He was given TXA and Kcentra, for attempted reversal of TNKase, it is unknown if the outside facility has cryoprecipitate.  He was started on amiodarone and propofol and transferred to our facility for higher level of care.  Upon arrival in emergency permit the patient is obtunded with a GCS of 3 on propofol infusion.  Myself, ERP and cardiology at bedside on patient's arrival.  A stat head CT in the ER does not reveal any acute changes, CBC in the ER shows significant leukocytosis at 29.7, hemoglobin 11.9; metabolic panel with a CO2 of 18, glucose 188, BUN 26, creatinine 1.26, anion gap 14.  The patient was observed in the ER and GCS improved to 11T therefore he was taken emergently to Cath Lab for intervention of his STEMI.\"    8/3 -brief PEA arrest ~ 1 am, received additional crystalloid and PRBCs given protamine for heparin reversal, inhaled TXA.  Hemoglobin had dropped to 7.8 and received 2U PRBCs, full ventilator support, titrating down epinephrine and norepinephrine infusions, arouses and follows commands.  " WBC 28.7, suspect stress reaction but Unasyn initiated for possible pneumonia/aspiration.          Interval Problem Update  Reviewed last 24 hour events:  VF arrest at [place], s/p TNK,   rec'd TXA, PCC for hemoptysis, PRBCs  Now awake, following;   S/p KEYLA to mid and distal RCA   Fleshy mass in ETT/ trachea; path P  SR/SB  PEA arrest x 1 round epi, ROSC this am  Tm = 97.5  I/O = 2.0/120  NGT  L fem Art  Epi 5, norepi 15  Vent day 2: 26/400/8/70  ABG:  DAPT  No abx  BAL P  Replace Calcium  Holding BB (metoprolol)  Leukocytosis may be stress reaction but to initiate Unasyn for possible aspiration/pneumonia    Review of Systems  Review of Systems   Unable to perform ROS: Intubated        Vital Signs for last 24 hours   Temp:  [33 °C (91.4 °F)-36.2 °C (97.2 °F)] 33 °C (91.4 °F)  Pulse:  [] 67  Resp:  [0-26] 18  BP: ()/(6-88) 129/66  SpO2:  [23 %-100 %] 100 %    Hemodynamic parameters for last 24 hours  CVP:  [-14 MM HG--12 MM HG] -14 MM HG    Respiratory Information for the last 24 hours  Vent Mode: APVCMV  Rate (breaths/min): 26  Vt Target (mL): 400  PEEP/CPAP: 8  MAP: 16  Control VTE (exp VT): 405    Physical Exam   Physical Exam  Vitals signs and nursing note reviewed.   Constitutional:       Appearance: He is well-developed. He is ill-appearing.      Interventions: He is intubated.   HENT:      Head: Normocephalic and atraumatic.      Right Ear: External ear normal.      Left Ear: External ear normal.      Nose: Nose normal.      Mouth/Throat:      Mouth: Mucous membranes are moist.      Comments: ET tube in position, moderate bloody secretions in the ETT/tubing  Eyes:      Conjunctiva/sclera: Conjunctivae normal.      Comments: Pupils 5 mm and nonreactive   Neck:      Musculoskeletal: Neck supple.      Vascular: No JVD.      Trachea: No tracheal deviation.   Cardiovascular:      Rate and Rhythm: Normal rate and regular rhythm.      Pulses: Normal pulses.      Comments: Remains on vasopressors, sinus  rhythm  Pulmonary:      Effort: He is intubated.      Breath sounds: Rales present. No wheezing.      Comments: Full ventilator support  Chest:      Comments: Barrel chested  Abdominal:      General: Bowel sounds are normal. There is no distension.      Palpations: Abdomen is soft.   Musculoskeletal:         General: No tenderness.   Skin:     General: Skin is dry.      Capillary Refill: Capillary refill takes 2 to 3 seconds.      Findings: No rash.      Comments: Slight clubbing; Multiple contusions to bilateral forearms   Neurological:      Comments: Arouses, follows commands moves all extremities appropriately   Psychiatric:      Comments: Unable to assess         Medications  Current Facility-Administered Medications   Medication Dose Route Frequency Provider Last Rate Last Dose   • NOREPINEPHRINE-SODIUM CHLORIDE 8-0.9 MG/250ML-% IV SOLN            • norepinephrine (Levophed) infusion 8 mg/250 mL (premix)  0-30 mcg/min Intravenous Continuous Poncho Sy Jr., D.O. 28.1 mL/hr at 08/03/20 0759 15 mcg/min at 08/03/20 0759   • NS infusion   Intravenous Continuous LAURA Evans Jr..O.       • [START ON 8/4/2020] aspirin (ASA) chewable tab 81 mg  81 mg Oral DAILY Bebeto Conklin M.D.       • Pharmacy Consult: Enteral tube insertion - review meds/change route/product selection  1 Each Other PHARMACY TO DOSE Eduardo Gaitan M.D.       • amiodarone (NEXTERONE) 360 mg/200 mL infusion  0.5-1 mg/min Intravenous Continuous LAURA Evans Jr..O. 17 mL/hr at 08/03/20 0759 0.5 mg/min at 08/03/20 0759   • Respiratory Therapy Consult   Nebulization Continuous RT LAURA Evans Jr..O.       • ipratropium-albuterol (DUONEB) nebulizer solution  3 mL Nebulization Q2HRS PRN (RT) LAURA Evans Jr..O.       • famotidine (PEPCID) tablet 20 mg  20 mg Enteral Tube Q12HRS LAURA Evans Jr..O.        Or   • famotidine (PEPCID) injection 20 mg  20 mg Intravenous Q12HRS LAURA Evans Jr..O.   20 mg at 08/03/20  0612   • MD Alert...ICU Electrolyte Replacement per Pharmacy   Other PHARMACY TO DOSE LAURA Evans Jr..JONA.       • lidocaine (XYLOCAINE) 1 % injection 1-2 mL  1-2 mL Tracheal Tube Q30 MIN PRN LEA Evans Jr.O.       • insulin regular (HumuLIN R,NovoLIN R) injection  1-6 Units Subcutaneous Q6HRS LAURA Evans Jr..O.   1 Units at 08/03/20 0613    And   • glucose 4 g chewable tablet 16 g  16 g Oral Q15 MIN PRN LAURA Evans Jr..OSalma        And   • dextrose 50% (D50W) injection 50 mL  50 mL Intravenous Q15 MIN PRN LAURA Evans Jr..O.       • senna-docusate (PERICOLACE or SENOKOT S) 8.6-50 MG per tablet 2 Tab  2 Tab Enteral Tube BID LAURA Evans Jr..OSalma   Stopped at 08/02/20 2015    And   • polyethylene glycol/lytes (MIRALAX) PACKET 1 Packet  1 Packet Enteral Tube QDAY PRN LAURA Evans Jr..OSalma        And   • magnesium hydroxide (MILK OF MAGNESIA) suspension 30 mL  30 mL Enteral Tube QDAY PRN LAURA Evans Jr..OSalma        And   • bisacodyl (DULCOLAX) suppository 10 mg  10 mg Rectal QDAY PRN LAURA Evans Jr..O.       • ondansetron (ZOFRAN) syringe/vial injection 4 mg  4 mg Intravenous Q4HRS PRN LAURA Evans Jr..O.       • ondansetron (ZOFRAN ODT) dispertab 4 mg  4 mg Oral Q4HRS PRN LAURA Evans Jr..O.       • labetalol (NORMODYNE/TRANDATE) injection 10 mg  10 mg Intravenous Q4HRS PRN LAURA Evans Jr..O.       • acetaminophen (TYLENOL) tablet 650 mg  650 mg Oral Q6HRS PRN LAURA Evans Jr..O.       • metoprolol (LOPRESSOR) tablet 12.5 mg  12.5 mg Enteral Tube TWICE DAILY LAURA Evans Jr..O.   Stopped at 08/02/20 2015   • atorvastatin (LIPITOR) tablet 40 mg  40 mg Oral Q EVENING Poncho Sy Jr., D.O.   Stopped at 08/02/20 2015   • sodium bicarbonate 8.4 % 150 mEq in sterile water for inj(pf) 1,000 mL infusion   Intravenous Continuous Poncho Sy Jr., D.O. 83 mL/hr at 08/03/20 0700     • clopidogrel (PLAVIX) tablet 75 mg  75 mg Oral DAILY Bebeto  GORAN Conklin   75 mg at 08/03/20 0612   • EPINEPHrine (Adrenalin) infusion 4 mg/250 mL (premix)  0-20 mcg/min Intravenous Continuous Poncho Sy Jr. D.O. 18.8 mL/hr at 08/03/20 0700 5 mcg/min at 08/03/20 0700   • DOPamine 1600 mcg/mL in D5W premix  0-20 mcg/kg/min Intravenous Continuous LAURA Evans Jr..O.   Stopped at 08/03/20 0333   • MIDAZOLAM HCL 2 MG/2ML INJ SOLN            • fentaNYL (SUBLIMAZE) injection 100 mcg  100 mcg Intravenous Q15 MIN PRN LAURA Evans Jr..O.   100 mcg at 08/03/20 0819    And   • fentaNYL (SUBLIMAZE) injection 200 mcg  200 mcg Intravenous Q15 MIN PRN LAURA Evans Jr..OSalma        And   • fentaNYL (SUBLIMAZE) 50 mcg/mL in 50mL (Continuous Infusion)   Intravenous Continuous LAURA Evans Jr..O. 1 mL/hr at 08/03/20 0757 50 mcg/hr at 08/03/20 0757    And   • dexmedetomidine (PRECEDEX) 400 mcg/100mL NS premix infusion  0-1.5 mcg/kg/hr Intravenous Continuous LAURA Evans Jr..O. 7.9 mL/hr at 08/03/20 0803 0.5 mcg/kg/hr at 08/03/20 0803       Fluids    Intake/Output Summary (Last 24 hours) at 8/3/2020 0859  Last data filed at 8/3/2020 0600  Gross per 24 hour   Intake 2015.29 ml   Output 120 ml   Net 1895.29 ml       Laboratory  Recent Labs     08/02/20  2200 08/03/20  0128 08/03/20  0350   ISTATAPH 7.214* 7.172* 7.350*   ISTATAPCO2 42.0* 51.3* 38.8*   ISTATAPO2 60* 277* 306*   ISTATATCO2 18* 20 23   DHYNEHW7IPW 85* 100* 100*   ISTATARTHCO3 17.0 18.8 21.5   ISTATARTBE -10* -10* -4   ISTATTEMP 37.0 C 34.0 C 32.5 C   ISTATFIO2 30 100 100   ISTATSPEC Arterial Arterial Arterial   ISTATAPHTC 7.214* 7.212* 7.415   NOKLMRCA1WF 60* 263* 285*         Recent Labs     08/02/20 1923 08/03/20 0025 08/03/20  0345   SODIUM 135 137 141   POTASSIUM 5.4 4.6 4.0   CHLORIDE 103 106 104   CO2 18* 17* 18*   BUN 26* 25* 26*   CREATININE 1.26 1.21 1.35   MAGNESIUM  --   --  2.5   PHOSPHORUS  --   --  6.0*   CALCIUM 8.2* 7.1* 6.9*     Recent Labs     08/02/20 1923 08/03/20  0025  08/03/20  0345   ALTSGPT  --  55*  --    ASTSGOT  --  192*  --    ALKPHOSPHAT  --  64  --    TBILIRUBIN  --  0.6  --    GLUCOSE 188* 202* 209*     Recent Labs     08/02/20 1923 08/03/20  0025 08/03/20 0135 08/03/20  0345   WBC 29.7*  --  21.3* 28.7*   NEUTSPOLYS 89.20*  --  80.90* 88.80*   LYMPHOCYTES 3.70*  --  11.60* 5.00*   MONOCYTES 5.50  --  6.40 5.10   EOSINOPHILS 0.20  --  0.00 0.10   BASOPHILS 0.40  --  0.10 0.30   ASTSGOT  --  192*  --   --    ALTSGPT  --  55*  --   --    ALKPHOSPHAT  --  64  --   --    TBILIRUBIN  --  0.6  --   --      Recent Labs     08/02/20 1923 08/03/20 0135 08/03/20 0345 08/03/20  0610   RBC 3.59*  --  2.36* 4.03*  --    HEMOGLOBIN 11.9*   < > 7.8* 12.4* 12.6*   HEMATOCRIT 37.1*  --  24.2* 37.8*  --    PLATELETCT 297  --  205 206  --    APTT 30.5  --   --   --   --     < > = values in this interval not displayed.       Imaging  X-Ray:  I have personally reviewed the images and compared with prior images.    Assessment/Plan  Hemoptysis- (present on admission)  Assessment & Plan  Likely due to friable endobronchial mass versus tracheal trauma during intubation  Bronchoscopy not consistent with diffuse alveolar hemorrhage  Inhaled TXA  Protamine for heparin reversal  Trend hemoglobin, transfuse to maintain hemoglobin greater than 8    Cardiac arrest (HCC)- (present on admission)  Assessment & Plan  Reportedly V. fib arrest at outside hospital, ? Reperfusion following TNKase  Continue amiodarone  Monitor for recurrent arrhythmias, optimize electrolytes    Acute ST elevation myocardial infarction (STEMI) due to occlusion of right coronary artery (HCC)- (present on admission)  Assessment & Plan  Taken to Cath Lab on 8/2/2020, KEYLA placed in RCA  Cardiology on board  DAPT  Echocardiogram this morning  Monitor for arrhythmias  Statin  beta-blocker when able    Ischemic cardiomyopathy (HCC)- (present on admission)  Assessment & Plan  Left ventricular ejection fraction of 35%  Inotrope for  inotropic support  Careful volume resuscitation  Will need beta-blocker, ACE inhibitor and diuresis when able    Metabolic acidosis- (present on admission)  Assessment & Plan  Bicarbonate infusion  Continue resuscitation    Lung cancer (HCC)  Assessment & Plan  Reportedly treated in 2005  3 spiculated masses present on CT chest  Endobronchial mobile tissue mass on bronchoscopy, follow-up pathology    COPD (chronic obstructive pulmonary disease) (HCC)- (present on admission)  Assessment & Plan  Not in acute exacerbation  RT/O2 Protocols  Titrate supplemental FiO2 to maintain SpO2 >92%    Leukocytosis- (present on admission)  Assessment & Plan  Likely reactive  Monitor for signs of infection, low threshold to initiate antimicrobials    Encephalopathy acute- (present on admission)  Assessment & Plan  Limit sedatives  GCS improved to 11T, limit sedatives not a temperature management candidate    Hyperglycemia- (present on admission)  Assessment & Plan  Goal blood glucose 120-180  sliding scale insulin, accuchecks  hypoglycemia protocol    Ventricular fibrillation (HCC)- (present on admission)  Assessment & Plan  At outside hospital  Continue amiodarone       Updated plan: Continue full ventilator support, not appropriate for liberation yet  Titrate vasopressors, new radial art line placed  Empiric Unasyn added for leukocytosis, possible pneumonia versus aspiration, follow-up culture/BAL  Initiate enteral nutrition  Ongoing plan as above    VTE:  Contraindicated  Ulcer: H2 Antagonist  Lines: Central Line  Ongoing indication addressed    I have performed a physical exam and reviewed and updated ROS and Plan today (8/3/2020). In review of yesterday's note (8/2/2020), there are no changes except as documented above.     Discussed patient condition and risk of morbidity and/or mortality with RN, RT, Pharmacy and [facility] resident  The patient remains critically ill.  Critical care time = 80 minutes in directly providing and  coordinating critical care and extensive data review.  No time overlap and excludes procedures.

## 2020-08-03 NOTE — ASSESSMENT & PLAN NOTE
- likely stress reaction, patient also hypothermic but this could be from hypoperfusion from cardiac arrest  - trending down to normal range, now normothermic  - finished unasyn course for aspiration PNA 8/10  -now uptrending again 8/12  Plan  - started Cefepime for HAP

## 2020-08-03 NOTE — ASSESSMENT & PLAN NOTE
Taken to Cath Lab on 8/2/2020, KEYLA placed in RCA  Continue DAPT  Reduced EF 35%, ischemic cardiomyopathy  Meds reviewed, cardiology following

## 2020-08-03 NOTE — PROGRESS NOTES
Call report received from Vidya in cath lab. Per cath lab, patient in route to T606 in next 5-10 mins

## 2020-08-03 NOTE — CONSULTS
"Critical Care Consultation    Date of consult: 8/2/2020    Referring Physician  Ruben Hughes M.D.    Reason for Consultation  Cardiac arrest, STEMI, acute hypoxic respiratory failure    History of Presenting Illness  74 y.o. male with a past medical history of hypercholesterolemia, hypothyroidism, COPD, lung cancer status post chemotherapy in 2005 who presented 8/2/2020 as a transfer from [facility] where he presented today following the sudden onset of retrosternal crushing chest pain which was rated at that time as 8 out of 10 and radiating to the left arm.  He was found to have an inferior ST elevation myocardial infarction and received tenecteplase for treatment.  He subsequently had a reperfusion ventricular fibrillation which required defibrillation and 1 round of ACLS.  He was intubated with RSI at 1615, following this intubation hemoptysis was noted and was reported to the transport team as \"a liter and a half of serosanguinous fluid\".  He was given TXA and Kcentra, for attempted reversal of TNKase, it is unknown if the outside facility has cryoprecipitate.  He was started on amiodarone and propofol and transferred to our facility for higher level of care.  Upon arrival in emergency permit the patient is obtunded with a GCS of 3 on propofol infusion.  Myself, ERP and cardiology at bedside on patient's arrival.    A stat head CT in the ER does not reveal any acute changes, CBC in the ER shows significant leukocytosis at 29.7, hemoglobin 11.9; metabolic panel with a CO2 of 18, glucose 188, BUN 26, creatinine 1.26, anion gap 14.    The patient was observed in the ER and GCS improved to 11T therefore he was taken emergently to Cath Lab for intervention of his STEMI.    Code Status  Full Code    Review of Systems  Review of Systems   Unable to perform ROS: Intubated       Past Medical History   has a past medical history of Chronic obstructive pulmonary disease (HCC), Emphysema of lung (HCC), High " cholesterol, Hypothyroid, and Lung cancer (HCC).    Surgical History   has no past surgical history on file.    Family History  family history is not on file.    Social History       Medications  Home Medications     Reviewed by Teddy Dinh (Pharmacy Intern) on 08/02/20 at 1919  Med List Status: Unable to Obtain   Medication Last Dose Status        Patient Harley Taking any Medications                     Current Facility-Administered Medications   Medication Dose Route Frequency Provider Last Rate Last Dose   • AMIODARONE HCL IN DEXTROSE 360-4.14 MG/200ML-% IV SOLN            • amiodarone (NEXTERONE) 360 mg/200 mL infusion  0.5-1 mg/min Intravenous Continuous LAURA Evans Jr..O.       • heparin injection 3,700 Units  3,700 Units Intravenous PRN LAURA Evans Jr..OSalma        And   • heparin infusion 25,000 units in 500 mL 0.45% NACL   Intravenous Continuous LAURA Evans Jr..O.       • clopidogrel (PLAVIX) tablet 300 mg  300 mg Enteral Tube Once Bebeto GORAN Conklin       • Respiratory Therapy Consult   Nebulization Continuous RT LAURA Evans Jr..O.       • ipratropium-albuterol (DUONEB) nebulizer solution  3 mL Nebulization Q2HRS PRN (RT) LAURA Evans Jr..O.       • famotidine (PEPCID) tablet 20 mg  20 mg Enteral Tube Q12HRS LAURA Evans Jr..O.        Or   • famotidine (PEPCID) injection 20 mg  20 mg Intravenous Q12HRS LAURA Evans Jr..O.       • senna-docusate (PERICOLACE or SENOKOT S) 8.6-50 MG per tablet 2 Tab  2 Tab Enteral Tube BID LAURA Evans Jr..O.        And   • polyethylene glycol/lytes (MIRALAX) PACKET 1 Packet  1 Packet Enteral Tube QDAY PRN LAURA Evans Jr..O.        And   • magnesium hydroxide (MILK OF MAGNESIA) suspension 30 mL  30 mL Enteral Tube QDAY PRN LAURA Evans Jr..O.        And   • bisacodyl (DULCOLAX) suppository 10 mg  10 mg Rectal QDAY PRN LAURA Evans Jr..O.       • MD Alert...ICU Electrolyte Replacement per Pharmacy    Other PHARMACY TO DOSE LAURA Evans Jr..JONA.       • lidocaine (XYLOCAINE) 1 % injection 1-2 mL  1-2 mL Tracheal Tube Q30 MIN PRN Poncho Sy Jr., D.O.       • insulin regular (HumuLIN R,NovoLIN R) injection  1-6 Units Subcutaneous Q6HRS LAURA Evans Jr..JONA.        And   • glucose 4 g chewable tablet 16 g  16 g Oral Q15 MIN PRN Poncho Sy Jr., D.O.        And   • dextrose 50% (D50W) injection 50 mL  50 mL Intravenous Q15 MIN PRN LAURA Evans Jr..O.         No current outpatient medications on file.       Allergies  Allergies   Allergen Reactions   • Sulfa Drugs        Vital Signs last 24 hours  Pulse:  [76-77] 77  Resp:  [19] 19  BP: ()/(64-88) 94/64  SpO2:  [100 %] 100 %    Physical Exam  Physical Exam  Vitals signs and nursing note reviewed.   Constitutional:       General: He is in acute distress.      Appearance: He is well-developed. He is toxic-appearing.      Interventions: He is intubated.   HENT:      Head: Normocephalic and atraumatic.      Right Ear: External ear normal.      Left Ear: External ear normal.      Nose: Nose normal.      Mouth/Throat:      Mouth: Mucous membranes are dry.      Comments: ET tube in position, scant bloody secretions (<2 mL) suctioned with multiple suction attempts.  Eyes:      Conjunctiva/sclera: Conjunctivae normal.      Comments: Pupils 5 mm and nonreactive   Neck:      Musculoskeletal: Neck supple.      Vascular: No JVD.      Trachea: No tracheal deviation.   Cardiovascular:      Rate and Rhythm: Normal rate and regular rhythm.      Pulses: Normal pulses.   Pulmonary:      Effort: He is intubated.      Breath sounds: Rhonchi and rales present. No wheezing.   Chest:      Comments: Barrel chested  Abdominal:      General: Bowel sounds are normal. There is no distension.      Palpations: Abdomen is soft.   Musculoskeletal:         General: No tenderness.   Skin:     General: Skin is dry.      Capillary Refill: Capillary refill takes 2 to 3  seconds.      Findings: No rash.      Comments: Slight clubbing  Multiple contusions to bilateral forearms  Dusky, cyanotic fingers, toes and ears   Neurological:      Comments: GCS 3T on arrival, propofol stopped shortly after arrival in the ER   Psychiatric:      Comments: Unable to assess         Fluids  No intake or output data in the 24 hours ending 20    Laboratory  Recent Results (from the past 48 hour(s))   EKG    Collection Time: 20  7:16 PM   Result Value Ref Range    Report       [facility] Emergency Dept.    Test Date:  2020  Pt Name:    HIMANSHU ALEJANDRA                   Department: ER  MRN:        [id number]                      Room:        02  Gender:     Male                         Technician: [id number]  :        1945                   Requested By:LAI CORONA  Order #:    [id number]                    Reading MD:    Measurements  Intervals                                Axis  Rate:       76                           P:          84  ND:         220                          QRS:        83  QRSD:       126                          T:          105  QT:         344  QTc:        387    Interpretive Statements  SINUS RHYTHM  FIRST DEGREE AV BLOCK  NONSPECIFIC INTRAVENTRICULAR CONDUCTION DELAY  INFERIOR INJURY, PROBABLE EARLY ACUTE INFARCT  BORDERLINE R WAVE PROGRESSION, ANTERIOR LEADS  No previous ECG available for comparison     CBC WITH DIFFERENTIAL    Collection Time: 20  7:23 PM   Result Value Ref Range    WBC 29.7 (H) 4.8 - 10.8 K/uL    RBC 3.59 (L) 4.70 - 6.10 M/uL    Hemoglobin 11.9 (L) 14.0 - 18.0 g/dL    Hematocrit 37.1 (L) 42.0 - 52.0 %    .3 (H) 81.4 - 97.8 fL    MCH 33.1 (H) 27.0 - 33.0 pg    MCHC 32.1 (L) 33.7 - 35.3 g/dL    RDW 61.2 (H) 35.9 - 50.0 fL    Platelet Count 297 164 - 446 K/uL    MPV 9.3 9.0 - 12.9 fL    Neutrophils-Polys 89.20 (H) 44.00 - 72.00 %    Lymphocytes 3.70 (L) 22.00 - 41.00 %    Monocytes 5.50 0.00 - 13.40 %     Eosinophils 0.20 0.00 - 6.90 %    Basophils 0.40 0.00 - 1.80 %    Immature Granulocytes 1.00 (H) 0.00 - 0.90 %    Nucleated RBC 0.10 /100 WBC    Neutrophils (Absolute) 27.24 (H) 1.82 - 7.42 K/uL    Lymphs (Absolute) 1.13 1.00 - 4.80 K/uL    Monos (Absolute) 1.68 (H) 0.00 - 0.85 K/uL    Eos (Absolute) 0.05 0.00 - 0.51 K/uL    Baso (Absolute) 0.11 0.00 - 0.12 K/uL    Immature Granulocytes (abs) 0.32 (H) 0.00 - 0.11 K/uL    NRBC (Absolute) 0.02 K/uL       Imaging  DX-CHEST-LIMITED (1 VIEW)   Final Result         1.  Interstitial pulmonary parenchymal prominence suggest chronic underlying lung disease, component of interstitial edema and/or infiltrates not excluded. Stable since prior study   2.  Left upper lobe nodular density, see dedicated CT performed August 2, 2020 for further characterization.   3.  Small bilateral pleural effusions   4.  Hyperexpansion of lungs favors changes of COPD.   5.  Atherosclerosis   6.  Right internal jugular central line is seen, there is no new central line appreciated since prior study.      DX-ABDOMEN FOR TUBE PLACEMENT   Final Result         1.  Nonspecific bowel gas pattern.   2.  Nasogastric tube tip terminates overlying the expected location of the pylorus or first duodenal segment.      DX-CHEST-PORTABLE (1 VIEW)   Final Result         1.  Interstitial pulmonary parenchymal prominence suggest chronic underlying lung disease, component of interstitial edema and/or infiltrates not excluded.   2.  Left upper lobe nodular density, see dedicated CT performed today for further characterization.   3.  Small bilateral pleural effusions   4.  Hyperexpansion of lungs favors changes of COPD.   5.  Atherosclerosis      EC-ECHOCARDIOGRAM COMPLETE W/O CONT   Final Result      CT-CHEST (THORAX) WITH   Final Result      1.  Diffuse bullous emphysematous changes of the chest with 3 areas of spiculation, 2 in the left upper lobe, and one in the right lower lobe.      2.  Differential diagnosis  includes lung carcinoma versus acute and chronic inflammatory changes associated with bullous disease.      3.  Comparison with prior imaging, if available, is recommended. Alternatively PET/CT or biopsy may be of value to exclude lung carcinoma.      4.  No thoracic adenopathy.      5.  Bibasilar areas of atelectasis or pneumonia.      6.  No significant pleural effusion.      7.  No evidence of acute hemorrhage identified.      Fleischner Society pulmonary nodule recommendations:         CT-HEAD W/O   Final Result      1.  No acute intracranial process.      2.  Atrophy and small vessel ischemic change.      CL-LEFT HEART CATHETERIZATION WITH POSSIBLE INTERVENTION    (Results Pending)       Assessment/Plan  COPD (chronic obstructive pulmonary disease) (HCC)- (present on admission)  Assessment & Plan  Not in acute exacerbation  RT/O2 Protocols  Titrate supplemental FiO2 to maintain SpO2 >92%    Leukocytosis- (present on admission)  Assessment & Plan  Likely reactive  Monitor for signs of infection, low threshold to initiate antimicrobials    Encephalopathy acute- (present on admission)  Assessment & Plan  Limit sedatives  GCS improved to 11T, limit sedatives not a temperature management candidate    Ischemic cardiomyopathy (HCC)- (present on admission)  Assessment & Plan  Left ventricular ejection fraction of 35%  Inotrope for inotropic support  Careful volume resuscitation  Will need beta-blocker, ACE inhibitor and diuresis when able    Metabolic acidosis- (present on admission)  Assessment & Plan  Bicarbonate infusion  Continue resuscitation    Lung cancer (HCC)- (present on admission)  Assessment & Plan  Reportedly treated in 2005  3 spiculated masses present on CT chest  Endobronchial mobile tissue mass on bronchoscopy, follow-up pathology    Hyperglycemia- (present on admission)  Assessment & Plan  Goal blood glucose 120-180  sliding scale insulin, accuchecks  hypoglycemia protocol    Hemoptysis- (present on  admission)  Assessment & Plan  Likely due to friable endobronchial mass versus tracheal trauma during intubation  Bronchoscopy not consistent with diffuse alveolar hemorrhage  Inhaled TXA  Protamine for heparin reversal  Trend hemoglobin, transfuse to maintain hemoglobin greater than 8    Ventricular fibrillation (HCC)- (present on admission)  Assessment & Plan  At outside hospital  Continue amiodarone    Cardiac arrest (HCC)- (present on admission)  Assessment & Plan  Reportedly V. fib arrest at outside hospital, ? Reperfusion following TNKase  Continue amiodarone  Monitor for recurrent arrhythmias, optimize electrolytes    Acute ST elevation myocardial infarction (STEMI) due to occlusion of right coronary artery (HCC)- (present on admission)  Assessment & Plan  Taken to Cath Lab on 8/2/2020, KEYLA placed in RCA  Cardiology on board  DAPT  Echocardiogram this morning  Monitor for arrhythmias  Statin  beta-blocker when able      Discussed patient condition and risk of morbidity and/or mortality with RN, RT, Pharmacy, [employer] [facility] resident, Charge nurse / hot rounds, cardiology and ERP.      The patient remains critically ill.  Critical care time = 110 minutes in directly providing and coordinating critical care and extensive data review.  No time overlap and excludes procedures.

## 2020-08-03 NOTE — ASSESSMENT & PLAN NOTE
- underlying ischemic heart disease, multivessel disease, initially received tenecteplase at outside facility, had Vfib, attained ROSC with 1 round of ACLS  - subsequently had PEA after cath at [facility] 8/3, attained ROSC with Epi and one round of ACLS  Plan  - Amiodarone to continue per Cardiology  - watch for post cath/ MI complications with continuous telemetry monitoring

## 2020-08-03 NOTE — ASSESSMENT & PLAN NOTE
- large volume hemoptysis after intubation and tenecteplase  - finding on bronchoscopy of friable left mainstem bronchus mass, sent to path  - hx of lung cancer, S/P chemo in 2005  - repeat bronchoscopy 8/4, evident old blood but no mucosal lesion, likely primary hemoptysis in setting of traumatic intubation and tenecteplase as opposed to intrabronchial lesion  Plan  - await path on mass, BAL fluid studies  - transfuse to keep Hb>7

## 2020-08-03 NOTE — ED TRIAGE NOTES
Pt BIB med flight with c/c of cardiac arrest. Pt went into [facility] today around 1415 due to crushing chest pain, pt found to be a STEMI. 35mg of TNK given at 1515 and 324mg of ASA at 1459. Pt then had a vfib arrest around 1624. Pt was intubated. Shortly after intubation pt began to blood heavily out of his ET tube. Pt given 1gram of TXA, 1 unit of RBCs, and kcentra 1500mg? around 1630. Pt was then transported by [facility], started on a proprofol drip and amiodarone drip at 1mg @1645. On arrival here cardiology, pulmonology, ER MD to bedside. Report given to Will BALL

## 2020-08-03 NOTE — PROCEDURES
INDICATION: Blood pressor support with pressors   PROCEDURE : Dr. Lynch  ATTENDING PHYSICIAN: Dr. Sy In Attendance (Y)  CONSENT:   The procedure was performed emergently and the permission was implied because of the emergent nature.     PROCEDURE SUMMARY:    My hands were washed immediately prior to the procedure. I wore a surgical cap, mask, full gown and sterile gloves throughout the procedure. The patient was placed in Trendelenburg position. RIGHT chest region was prepped using chlorhexidine scrub and draped in sterile fashion using a full drape and sterile probe cover employed. The medial and lateral heads of the sternocleidomastoid muscle were identified as was the carotid pulse. The Internal Jugular vein was identified using the ultrasound. Anesthesia was achieved over the vein using 1% lidocaine. Using real-time out of plane guidance, the introducer needle was inserted into the Internal Jugular vein under direct ultrasound visualization. Venous blood was withdrawn. The syringe was removed and a guidewire was advanced into the introducer needle. The guidewire was visualized in the Internal Jugular Vein by ultrasound. A small incision was made at the skin surface with a scalpel and the introducer needle was exchanged for a dilator over the guidewire. After appropriate dilation was obtained, the dilator was exchanged over the wire for a Triple central venous catheter. The wire was removed and the catheter was sutured in place. A sterile sorbaview shield was placed over the catheter at the insertion site. The patient tolerated the procedure without any hemodynamic compromise. At time of procedure completion, all ports aspirated and flushed properly. Post-procedure chest x-ray is pending at this time. Estimated blood loss is ~3cc.

## 2020-08-03 NOTE — PROGRESS NOTES
12 hour chart check    Monitor summary:  SB-SR 42-87 w/ frequent PVCs and known elevation in lateral leads.  0.16/0.12/0.58

## 2020-08-03 NOTE — PROGRESS NOTES
Bedside report received from night RN.  Pt assessed, follows commands, open eyes, moves all extremities.  Medicated for Pain via CPOT, crackles heard through all bases, lizbeth blood from ET tube, but slowing in volume.  Pt updated on event that happened when patient was off of sedation.

## 2020-08-03 NOTE — ED PROVIDER NOTES
ED Provider Note    ER Provider Note         CHIEF COMPLAINT  Chief Complaint   Patient presents with   • Chest Pain   • Cardiac Arrest       HPI  Sukhi Howard is a 74 y.o. male who presents to the Emergency Department intubated and sedated.  The patient was seen at outside facility and found to have an inferior ST elevation MI.  The patient was given lytics at this outside facility.  The patient's mental status did decrease.  At that time the patient required intubation and had a very bloody airway.  Patient was then given reversal agents including cryoprecipitate and TXA.  The patient had an uneventful flight to this emergency department.  The patient was in a sinus rhythm.  The patient was found to have pupils that were dilated to 5 and unreactive and the patient's been only on small amount of  propofol.    REVIEW OF SYSTEMS  See [facility] for further details. All other systems are negative.     PAST MEDICAL HISTORY   has a past medical history of Chronic obstructive pulmonary disease (HCC), Emphysema of lung (HCC), High cholesterol, Hypothyroid, and Lung cancer (HCC).    SURGICAL HISTORY  patient denies any surgical history    SOCIAL HISTORY  Social History     Tobacco Use   • Smoking status: Not on file   Substance Use Topics   • Alcohol use: Not on file   • Drug use: Not on file      Social History     Substance and Sexual Activity   Drug Use Not on file       FAMILY HISTORY  History reviewed. No pertinent family history.    CURRENT MEDICATIONS  Home Medications     Reviewed by Teddy Dinh (Pharmacy Intern) on 08/02/20 at 1919  Med List Status: Unable to Obtain   Medication Last Dose Status        Patient Harley Taking any Medications                       ALLERGIES  Allergies   Allergen Reactions   • Sulfa Drugs        PHYSICAL EXAM  VITAL SIGNS: BP (!) 176/88   Pulse 76   Resp 19   Wt 63.5 kg (140 lb)   SpO2 100%      Constitutional: Sedated and intubated with pupils nonreactive and at 5 mm  HENT: No  signs of trauma, Bilateral external ears normal, Nose normal.   Eyes: nderness, Supple, No stridor.   Lymphatic: No lymphadenopathy noted.   Cardiovascular: Regular rate and rhythm, nondisplaced PMI  Thorax & Lungs: Intubated, some blood around the airway  abdomen: Bowel sounds normal, Soft, No tenderness, No masses, No pulsatile masses. No peritoneal signs.  Skin: Cool, Dry, No erythema, No rash.   Back: No deformities noted  Extremities: Faint distal pulses, No edema, No tenderness, No cyanosis.  Musculoskeletal: No deformities  Neurologic:   sedated and intubated  Psychiatric: Sedated and intubated    DIAGNOSTIC STUDIES / PROCEDURES    EKG Interpretation:  Interpreted by me  ST elevation noted in the posterior leads in lead II, III and aVF.  They are diminished from EKG that was performed at the patient's outside facility.         LABS  Labs Reviewed   CBC WITH DIFFERENTIAL - Abnormal; Notable for the following components:       Result Value    WBC 29.7 (*)     RBC 3.59 (*)     Hemoglobin 11.9 (*)     Hematocrit 37.1 (*)     .3 (*)     MCH 33.1 (*)     MCHC 32.1 (*)     RDW 61.2 (*)     Neutrophils-Polys 89.20 (*)     Lymphocytes 3.70 (*)     Immature Granulocytes 1.00 (*)     Neutrophils (Absolute) 27.24 (*)     Monos (Absolute) 1.68 (*)     Immature Granulocytes (abs) 0.32 (*)     All other components within normal limits   BASIC METABOLIC PANEL   TROPONIN   PROBRAIN NATRIURETIC PEPTIDE, NT   PLATELET MAPPING WITH BASIC TEG    Narrative:     Is the patient on heparin (including low molecular weight  heparin, subcutaneous heparin, or lovenox)?->Yes  Do you want to extend TEG graph to LY30? (If no, graph will  terminate at MA)->Yes   APTT       All labs reviewed by me.    RADIOLOGY  CT-HEAD W/O   Final Result      1.  No acute intracranial process.      2.  Atrophy and small vessel ischemic change.      CT-CHEST (THORAX) WITH    (Results Pending)   EC-ECHOCARDIOGRAM COMPLETE W/O CONT    (Results Pending)        The radiologist's interpretation of all radiological studies have been reviewed by me.    COURSE & MEDICAL DECISION MAKING  Pertinent Labs & Imaging studies reviewed. (See chart for details)    This is a 74 y.o. male that presents with ST elevation MI that was given TPA at an outside facility.  He had airway bleeding and had the TPA reversed.  The patient is now intubated and sedated with nonreactive pupils.  CT head as well as CT chest were performed to evaluate for intracranial bleed as well as cause of bleeding in the patient's chest.  We will repeat the patient's troponin as well as proBNP and basic labs.  The patient will need to go to the ICU and then potentially Cath Lab.    7:21 PM - Patient seen and examined at bedside.    7:47 PM-patient is back from CT scanner.  Patient CT chest looks asymmetrical with potential consolidation in the right upper lung field.  This was read by the radiologist.  No obvious bleeding noted on the patient's CT scan of the head.  A tag was sent.  The patient does have a leukocytosis of 29.7.  Patient was admitted to the intensivist in guarded condition.  Cardiologist consulted and will be coordinating the patient's care to the Cath Lab.    The total critical care time on this patient is 41 minutes, resuscitating patient, speaking with admitting physician, and deciphering test results. This 41 minutes is exclusive of separately billable procedures.    FINAL IMPRESSION  1. Cardiac arrest (HCC)    2. ST elevation myocardial infarction (STEMI), unspecified artery (HCC)    3. Altered mental status, unspecified altered mental status type              Electronically signed by: Ruben Hughes M.D., 8/2/2020

## 2020-08-03 NOTE — PROGRESS NOTES
This RN and Daniella RN went to pick patient up in ED. Upon entrance to room, observed improved neurologic function. Patient opening eyes to voice and following simple commands. Dr. Sy updated, came to bedside. Per Dr. Sy and cardiologist at bedside, patient to go to cath lab at this time. Per MD, to hold patient in ED until cath lab.

## 2020-08-03 NOTE — ASSESSMENT & PLAN NOTE
Reportedly V. fib arrest at outside hospital, ? Reperfusion following TNKase  Continue amiodarone PO  Monitor for recurrent arrhythmias, optimize electrolytes

## 2020-08-03 NOTE — PROGRESS NOTES
[facility] Progress Note      Admit Date: 8/2/2020    Resident(s): Igor Aldana M.D.  Attending: JIA VAN/ Dr. Gaitan    Date & Time:   8/3/2020   1:39 PM       Patient ID:    Name:             Sukhi Howard     YOB: 1945  Age:                 74 y.o.  male   MRN:               [id number]    HPI:  74 yr old male with PMHx of DLD, hypothyroidism, COPD, lung cancer S/P chemotherapy in 2005, presented 8/2/20 as a transfer from [facility] where he presented for sudden retrosternal chest pain 8/10, radiating to the left arm, found to have an inferior STEMI, received tenecteplase, following which he had an episode of Vfib needing defibrillation and 1 round of ACLS. He was then intubated with RSI, following which high volume hemoptysis was noted necessitating TXA and Kcentra. He was started on Amiodarone gtt, Propofol, obtunded on admission with GCS 3. He underwent a bronchoscopy on admission significant for a right mainstem bronchus friable mass which was removed and sent to path. GCS improved to 11T, he was taken to the cath lab and underwent PCI with KEYLA to RCA. Early on 8/3/20 AM he had PEA and Code Blue was called with him attaining ROSC after Epinephrine and 1 round of ACLS. He was also noted to be leukocytic and hypothermic to 35 C, being warmed with bear hugger. He is currently on Precedex, Levophed, Epinephrine, Propofol, weaned off Dopamine, on tube feeding per an oral Cortrak. He is currently intubated on CMV with RR 26, PEEP 8, FiO2 70%.    Consultants:  Cardiology  PMA:     Interval Events:  - conscious and alert this AM, following commands, was able to communicate to care team about his foot hurting via writing  - currently sedated on Propofol and Precedex gtt, MAP>65 on Levophed, Epi gtt, weaned off of Dopamine  - Vent settings - CMV, RR 26, PEEP 8, FiO2 0.70  - TTE with EF 35%, currently on pressors, BB, ACEi not started, on ASA, statin, Plavix  - on bicarb gtt for metabolic  "acidosis  - Amiodarone gtt  - Pepcid for GI ppx  - I/O SA - 2015/1895          Review of Systems   Unable to perform ROS: Critical illness       PHYSICAL EXAM  Vitals:    08/03/20 1000 08/03/20 1015 08/03/20 1030 08/03/20 1105   BP: 111/64      Pulse: 77 78 77 77   Resp: (!) 26 (!) 26 (!) 26 (!) 26   Temp: 36.3 °C (97.3 °F)      TempSrc: Bladder      SpO2: 100% 100% 100% 100%   Weight:       Height:         Body mass index is 26.87 kg/m².  /64   Pulse 77   Temp 36.3 °C (97.3 °F) (Bladder)   Resp (!) 26   Ht 1.626 m (5' 4\")   Wt 71 kg (156 lb 8.4 oz)   SpO2 100%   BMI 26.87 kg/m²   O2 therapy: Pulse Oximetry: 100 %, O2 Delivery Device: Ventilator    Physical Exam   Constitutional: He is well-developed, well-nourished, and in no distress. No distress.   HENT:   Head: Normocephalic and atraumatic.   Right Ear: External ear normal.   Left Ear: External ear normal.   Nose: Nose normal.   Mouth/Throat: No oropharyngeal exudate.   Eyes: Conjunctivae and EOM are normal. Right eye exhibits no discharge. Left eye exhibits no discharge. No scleral icterus.   Neck: Normal range of motion. Neck supple. No JVD present.   Cardiovascular: Normal rate, regular rhythm and normal heart sounds.   No murmur heard.  Pulmonary/Chest: Effort normal and breath sounds normal. No respiratory distress. He has no wheezes.   Abdominal: Soft. Bowel sounds are normal. He exhibits no distension. There is no guarding.   Genitourinary:    Genitourinary Comments: deferred     Musculoskeletal:         General: No edema.   Neurological:   GCS 7 (intubated)   Skin: Skin is warm and dry. He is not diaphoretic. No erythema.   pallor   Psychiatric:   deferred   Nursing note and vitals reviewed.      Respiratory:  Vent Mode: APVCMV  Respiration: (!) 26, Pulse Oximetry: 100 %    Chest Tube Drains:    Recent Labs     08/02/20  2200 08/03/20  0128 08/03/20  0350   ISTATAPH 7.214* 7.172* 7.350*   ISTATAPCO2 42.0* 51.3* 38.8*   ISTATAPO2 60* 277* " 306*   ISTATATCO2 18* 20 23   CYEQUIG3FDR 85* 100* 100*   ISTATARTHCO3 17.0 18.8 21.5   ISTATARTBE -10* -10* -4   ISTATTEMP 37.0 C 34.0 C 32.5 C   ISTATFIO2 30 100 100   ISTATSPEC Arterial Arterial Arterial   ISTATAPHTC 7.214* 7.212* 7.415   TXIFFOZR5TC 60* 263* 285*       HemoDynamics:  Pulse: 77 Blood Pressure : 111/64, Arterial BP: 135/60 CVP (mm Hg): (!) -14 MM HG    Neuro:      Fluids:  Date 08/03/20 0700 - 08/04/20 0659   Shift 1992-3076 1429-7429 5646-7060 24 Hour Total   INTAKE   I.V. 75.4   75.4     Precedex Volume 0   0     Norepinephrine Volume 37.8   37.8     Epinephrine Volume 37.6   37.6     Dopamine Volume 0   0   NG/GT 0   0     Intake (mL) (Enteral Tube 08/03/20 Cortrak - Gastric 10 Fr. Oral) 0   0   IV Piggyback 166   166     Volume (mL) (sodium bicarbonate 8.4 % 150 mEq in sterile water for inj(pf) 1,000 mL infusion) 166   166   Shift Total 241.4   241.4   OUTPUT   Urine 300   300     Output (mL) (Urethral Catheter Temperature probe) 300   300   Shift Total 300   300   NET -58.6   -58.6        Intake/Output Summary (Last 24 hours) at 8/3/2020 1302  Last data filed at 8/3/2020 1200  Gross per 24 hour   Intake 2256.65 ml   Output 420 ml   Net 1836.65 ml       Weight: 71 kg (156 lb 8.4 oz)  Body mass index is 26.87 kg/m².    Recent Labs     08/02/20 1923 08/03/20  0025 08/03/20  0345   SODIUM 135 137 141   POTASSIUM 5.4 4.6 4.0   CHLORIDE 103 106 104   CO2 18* 17* 18*   BUN 26* 25* 26*   CREATININE 1.26 1.21 1.35   MAGNESIUM  --   --  2.5   PHOSPHORUS  --   --  6.0*   CALCIUM 8.2* 7.1* 6.9*       GI/Nutrition:  Recent Labs     08/02/20 1923 08/03/20  0025 08/03/20 0345   ALTSGPT  --  55*  --    ASTSGOT  --  192*  --    ALKPHOSPHAT  --  64  --    TBILIRUBIN  --  0.6  --    GLUCOSE 188* 202* 209*       Heme:  Recent Labs     08/02/20 1923 08/03/20 0135 08/03/20 0345 08/03/20  0610 08/03/20  1158   RBC 3.59*  --  2.36* 4.03*  --   --    HEMOGLOBIN 11.9*   < > 7.8* 12.4* 12.6* 12.6*   HEMATOCRIT  37.1*  --  24.2* 37.8*  --   --    PLATELETCT 297  --  205 206  --   --    APTT 30.5  --   --   --   --   --     < > = values in this interval not displayed.       Infectious Disease:  Monitored Temp 2  Av.9 °C (93.1 °F)  Min: 32.5 °C (90.5 °F)  Max: 36.4 °C (97.5 °F)  Temp  Av.7 °C (94.4 °F)  Min: 33 °C (91.4 °F)  Max: 36.3 °C (97.3 °F)  Recent Labs     20  1923 08/03/20  0025 20  0135 20  0345   WBC 29.7*  --  21.3* 28.7*   NEUTSPOLYS 89.20*  --  80.90* 88.80*   LYMPHOCYTES 3.70*  --  11.60* 5.00*   MONOCYTES 5.50  --  6.40 5.10   EOSINOPHILS 0.20  --  0.00 0.10   BASOPHILS 0.40  --  0.10 0.30   ASTSGOT  --  192*  --   --    ALTSGPT  --  55*  --   --    ALKPHOSPHAT  --  64  --   --    TBILIRUBIN  --  0.6  --   --        Meds:  • norepinephrine (Levophed) infusion  0-30 mcg/min 16 mcg/min (20 1330)   • NS   Stopped (20 1334)   • Pharmacy  1 Each     • [START ON 2020] aspirin  81 mg     • atorvastatin  40 mg     • acetaminophen  650 mg     • [START ON 2020] clopidogrel  75 mg     • [START ON 2020] famotidine  20 mg      Or   • [START ON 2020] famotidine  20 mg     • ampicillin-sulbactam (UNASYN) IV  3 g Stopped (20 1216)   • amiodarone infusion  0.5-1 mg/min 0.5 mg/min (20 1000)   • Respiratory Therapy Consult       • ipratropium-albuterol  3 mL     • MD Alert...Adult ICU Electrolyte Replacement per Pharmacy       • lidocaine  1-2 mL     • insulin regular  1-6 Units      And   • dextrose 50%  50 mL     • senna-docusate  2 Tab      And   • polyethylene glycol/lytes  1 Packet      And   • magnesium hydroxide  30 mL      And   • bisacodyl  10 mg     • labetalol  10 mg     • sodium bicarbonate in swfi infusion   83 mL/hr at 20 1337   • EPINEPHrine (Adrenalin) infusion  0-20 mcg/min 2 mcg/min (20 1138)   • DOPamine 1600 mcg/mL in D5W  0-20 mcg/kg/min Stopped (20 0333)   • fentaNYL  100 mcg      And   • fentaNYL  200 mcg      And   •  fentaNYL   100 mcg/hr (08/03/20 1333)    And   • dexmedetomidine (PRECEDEX) infusion  0-1.5 mcg/kg/hr 0.5 mcg/kg/hr (08/03/20 0803)        Procedures:  8/2/20 - cardiac catheterization  8/2/20 - bronchoscopy  8/3/20 - RIJ CVC insertion  8/3/20 - arterial line insertion    Imaging:  DX-ABDOMEN FOR TUBE PLACEMENT   Final Result      Feeding tube tip at the distal stomach.      DX-CHEST-LIMITED (1 VIEW)   Final Result         1.  Interstitial pulmonary parenchymal prominence suggest chronic underlying lung disease, component of interstitial edema and/or infiltrates not excluded. Stable since prior study   2.  Left upper lobe nodular density, see dedicated CT performed August 2, 2020 for further characterization.   3.  Small bilateral pleural effusions   4.  Hyperexpansion of lungs favors changes of COPD.   5.  Atherosclerosis   6.  Right internal jugular central line is seen, there is no new central line appreciated since prior study.      DX-ABDOMEN FOR TUBE PLACEMENT   Final Result         1.  Nonspecific bowel gas pattern.   2.  Nasogastric tube tip terminates overlying the expected location of the pylorus or first duodenal segment.      DX-CHEST-PORTABLE (1 VIEW)   Final Result         1.  Interstitial pulmonary parenchymal prominence suggest chronic underlying lung disease, component of interstitial edema and/or infiltrates not excluded.   2.  Left upper lobe nodular density, see dedicated CT performed today for further characterization.   3.  Small bilateral pleural effusions   4.  Hyperexpansion of lungs favors changes of COPD.   5.  Atherosclerosis      EC-ECHOCARDIOGRAM COMPLETE W/O CONT   Final Result      CT-CHEST (THORAX) WITH   Final Result      1.  Diffuse bullous emphysematous changes of the chest with 3 areas of spiculation, 2 in the left upper lobe, and one in the right lower lobe.      2.  Differential diagnosis includes lung carcinoma versus acute and chronic inflammatory changes associated with  bullous disease.      3.  Comparison with prior imaging, if available, is recommended. Alternatively PET/CT or biopsy may be of value to exclude lung carcinoma.      4.  No thoracic adenopathy.      5.  Bibasilar areas of atelectasis or pneumonia.      6.  No significant pleural effusion.      7.  No evidence of acute hemorrhage identified.      Fleischner Society pulmonary nodule recommendations:         CT-HEAD W/O   Final Result      1.  No acute intracranial process.      2.  Atrophy and small vessel ischemic change.      CL-LEFT HEART CATHETERIZATION WITH POSSIBLE INTERVENTION    (Results Pending)       Assessment and Plan:      * Acute ST elevation myocardial infarction (STEMI) due to occlusion of right coronary artery (HCC)- (present on admission)  Assessment & Plan  - RCA STEMI< 100% occlusion on cath on admission  - S/P KEYLA to RCA, EF 35%  - currently on pressor support  Plan  - ASA, Plavix, statin per Cortrak  - holding off on BB, ACEi at this time due to ongoing need for pressor support  - Cardiology following, appreciate recs    Coronary artery disease due to calcified coronary lesion- (present on admission)  Assessment & Plan  - multivessel disease - 100% RCA occlusion, 100% LCx occlusion, left main 70% stenosis, LAD 30%  - S/P KEYLA to RCA, possible reperfusion injury manifested by PEA, attained ROSC  Plan\  - Cardiology following  - ASA, Plavix, statin  - to start BB, ACEi as able    Hemoptysis- (present on admission)  Assessment & Plan  - large volume hemoptysis after intubation and tenecteplase  - finding on bronchoscopy of friable left mainstem bronchus mass, sent to path  - hx of lung cancer, S/P chemo in 2005  Plan  - await path on mass  - consider repeat bronchoscopy to better delineate possible bleeding lesion  - transfuse to keep Hb>7    Cardiac arrest (HCC)- (present on admission)  Assessment & Plan  - underlying ischemic heart disease, multivessel disease, initially received tenecteplase at  outside facility, had Vfib, attained ROSC with 1 round of ACLS  - subsequently had PEA after cath at [facility], attained ROSC with Epi and one round of ACLS  - currently on Amiodarone gtt  Plan  - continue Amiodarone gtt  - watch for post cath/ MI complications with continuous telemetry monitoring  - Cardiology to consider CABG for multivessel disease    Metabolic acidosis- (present on admission)  Assessment & Plan  - from hypoperfusion, currently on bicarbonate gtt    Lung cancer (HCC)  Assessment & Plan  - in 2005, S/P chemo, recent hemoptysis and friable mass on bronch raising suspicion for recurrence  Plan  - follow path on mass found during bronch 8/3    COPD (chronic obstructive pulmonary disease) (HCC)- (present on admission)  Assessment & Plan  - underlying COPD, unclear if he is on supplemental O2 support  - currently intubated with CMV mode  Plan  - wean off vent PRN, down on FiO2 as able, ABG PRN to assess oxygenation    Leukocytosis- (present on admission)  Assessment & Plan  - likely stress reaction, patient also hypothermic but this could be from hypoperfusion from cardiac arrest  Plan  - Unasyn to cover for aspiration PNA    Encephalopathy acute- (present on admission)  Assessment & Plan  - likely toxic, metabolic, possibly hypoxic/ anoxic during Vfib/ PEA but he was alert and able to communicate this AM  - CTM, avoid prolonged hypoxia    Ventricular fibrillation (HCC)- (present on admission)  Assessment & Plan  - Vfib sustained when patient was being worked up for STEMI, likely from arrhythmogenic myocardium when hypoxic  Plan  - continue Amiodarone gtt      DISPO: continued ICU level of care for pressor support, hemodynamic monitoring, intubated currently    CODE STATUS: Full code    Quality Measures:  Cesar Catheter: 300 ml output since admit  DVT Prophylaxis: contraindicated, recent hemoptysis  Ulcer Prophylaxis: Pepcid  Antibiotics: Unasyn  Lines: PIV lines, RIJ CVC    This note was created using  voice recognition software. There may be unintended errors in spelling, grammar or content.

## 2020-08-03 NOTE — ASSESSMENT & PLAN NOTE
- Vfib sustained when patient was being worked up for STEMI, likely from arrhythmogenic myocardium when hypoxic  Plan  - discontinue Amiodarone

## 2020-08-03 NOTE — DIETARY
"Nutrition Support/TF Assessment:  Day 1 of admit.  Sukhi Howard is a 74 y.o. male with admitting DX of STEMI, Acute respiratory failure with hypoxia.     Current problem list:  1. Acute ST elevation myocardial infarction (STEMI) due to occlusion of right coronary artery   2. Cardiac arrest   3. Hemoptysis    4. Coronary artery disease due to calcified coronary lesion  5. Lung cancer   6. Metabolic acidosis   7. Ischemic cardiomyopathy  8. Ventricular fibrillation  9. Hyperglycemia    10. Encephalopathy acute   11. Leukocytosis    12. COPD (chronic obstructive pulmonary disease)      Assessment:  Estimated Nutritional Needs based on:   Height: 162.6 cm (5' 4\")  Weight: 71 kg (156 lb 8.4 oz)  Weight to Use in Calculations: 71 kg (156 lb 8.4 oz)  Ideal Body Weight: 59 kg (130 lb)  Percent Ideal Body Weight: 120.4  Body mass index is 26.87 kg/m²., BMI classification: Overweight    Calculation/Equation: MSJ X 1.2= 1635;  [place] State Equation (PSU) [id number], based on Vent setting of 10.4 L/min and Max temp of 36.2 degrees celsius.   Total Calories / day: 8092-5314  (Calories / k-23)  Total Grams Protein / day: 85-99  (Grams Protein / k.2 - 1.4)  Total Free Water/day: 2582-5166 ml/day (~ 25-30 ml/kg)     Evaluation:   1. TF consult received, pt on mechanical ventilation.   2. Enteral access/KUB pending.   3. Pertinent Labs: am Glu 209, BUN 26, Phos 6.0  4. Pertinent Meds: Amiodarone @ 0.5 mg/min,  Dopamine (last given at 0320), Epinephrin @ 5 mcg/min, Precedex, SSI, Levophed @ 15 mcg/min, Bowel protocol, Sodium Bicarbonate @ 83 ml/hr.  5. RN states pressor support continues but possibly starting to wean today. Reviewed with clarisa GUZMÁN to advance per protocol or advance slowly to goal. Will start with peptide based formula such as Impact Peptide 1.5 and advance slowly to monitor tolerance.  6. Pt with elevated Phos, Impact Peptide lower in phos and advancing slowly. Will monitor Phos/renal labs and adjust TF as " needed.   7. Cardiac diet education consult received, RD to follow up for education when appropriate.   8. Metabolic Cart consult received, will order to best assess needs.      Malnutrition Risk: limited information, pt intubated and covered from neck down in blankets.      Recommendations/Plan:  1. Begin Trickle feeds with Impact Peptide @ 10 ml/hr and advance by 10 ml/hr every 12 hours to goal rate of 40 ml/hr. This will provide 1440 kcals/day, 90 g pro/day, 960 mg Phos, and 739 ml free water/day.   2. Fluids per MD.     RD following.

## 2020-08-03 NOTE — PROGRESS NOTES
Dr Gaitan updated on cuff pressure differences between left upper arm and right upper arm.  60/40 in L upper arm, 120/60 in right upper arm.  Ok to use blood pressure cuff on right arm q2h and go with art line pressures

## 2020-08-03 NOTE — ASSESSMENT & PLAN NOTE
- likely toxic, metabolic, possibly hypoxic/ anoxic during Vfib/ PEA but he was alert and able to communicate this AM  - resolved, alert, oriented, able to follow commands

## 2020-08-03 NOTE — PROCEDURES
"CARDIAC CATHETERIZATION REPORT    Referring Provider: Bebeto Conklin MD    PROCEDURE PHYSICIAN: Arsenio Tenorio MD, [facility], [facility]  ASSISTANT: None      IMPRESSIONS:  1. Acute inferior STEMI  2. Successful PCI of the mid and distal RCA  3. Residual ischemia involving the left main coronary artery and circumflex  4. Severe elevation in LVEDP    Recommendations:  DAPT- preferably 6-12 months  Usual post cath care     Pre-procedure diagnosis: Inferior STEMI  Post-procedure diagnosis: Same    Procedure performed  Selective coronary angiography  Left heart catheterization  Percutaneous coronary intervention - Stent Placement (KEYLA to mid RCA and distal RCA)    Conscious sedation was supervised by myself and administered by trained personnel using fentanyl and versed between 2137 and 2217. The patient tolerated sedation without complication.     Procedure Description  1. Access: 6 [place] right radial artery Micropuncture technique was utilized following local anesthesia with lidocaine.  A radial cocktail containing verapamil, heparin and saline was administered in the radial artery sheath    2. Diagnostic description: The catheter was passed to the central circulation with the aide of J tipped 0.35\" wire. 5F TIG 4.0 were used to inject the coronary circulationand enter the left ventricle during invasive hemodynamic monitoring.Once all diagnostic information was obtained the equipment was removed from the body.      3. Description of Intervention: After confirming therapeutic anticoagulation intervention proceeded. A 6F AL 0.75 guiding catheter was used. The lesion was crossed with a 0.014\" Run Through.  I predilated the lesion with a 2.5 x 12 mm compliant balloon at 14 larisa.  Subsequently used a 2.5 x 15 mm NC balloon at 14 larisa which fully expanded the lesion.  I then deployed a 2.5 x 22 mm Kyle drug-eluting stent in the midsegment of the vessel 14 larisa.  This was postdilated with a 3.0 x 12 mm NC balloon at 12 larisa in the " proximal two thirds.  Subsequent angiograms demonstrated 80% stenosis in the distal segment of the vessel.  This was predilated with a 2.5 x 12 mm compliant balloon at 12 larisa and subsequently a 2.5 x 15 mm Kyle drug-eluting stent was deployed at 14 atmospheres.  Post procedure angiograms demonstrated excellent result.  At this point it was noted that copious blood was coming from the ET tube and the procedure was terminated.  The final ACT was 240.     4. Hemostasis: Radial band      Findings   Hemodynamics: Aorta: 151/78 mmHg  LV: 151/31 mmHg    Coronary Anatomy   Left Main: Diffuse 70% stenosis with moderate calcification   LAD: 30% proximal stenosis.   LCx: 100% occlusion proximally.  A moderate sized obtuse marginal seen filling faintly by left to left collaterals.   RCA: Dominant, heavily calcified and tortuous vessel with 100% occlusion in the midsegment.      Results of intervention to the mid RCA:  Pre: 100% stenosis and TIMMY 0 flow  Post: 0% residual stenosis and TIMMY III flow. No dissection or distal embolization.    Results of intervention to the distal RCA:  Pre: 80% stenosis and TIMMY 0 flow  Post: 0% residual stenosis and TIMMY III flow. No dissection or distal embolization.    Technical Factors  1. Complications: None  2. Estimated Blood Loss: <50 cc  3. Specimens: None  4. Contrast Volume: 55 ml  5. Medications: Radial cocktail (Verapamil 2.5 mg, Nitroglycerin 100 mcg) Heparin to maintain ACT >250 Intracoronary nitroglycerin  6. Radiation (Air Kerma): 278 mGy

## 2020-08-03 NOTE — ASSESSMENT & PLAN NOTE
- underlying COPD, unclear if he is on supplemental O2 support at home  - extubated successfully on 8/8 initially to BiPAP, currently saturating well on Oxymask at 40%  - Jayesh Smith RT per protocol

## 2020-08-03 NOTE — CONSULTS
Reason for Consult:  Asked by Dr Ruben Hughes M.D. to see this patient with inferior stemi    CC:   Chief Complaint   Patient presents with   • Chest Pain   • Cardiac Arrest       HPI:      74 year old man with PMH AF, COPD, lung CA p/w sudden onset chest pressure to [facility] where found inferior stemi s/p tenekteplase and aspirin load c/b in-hospital cardiac arrest with ventricular fibrillation with ROSC after one round ACLS and successful first defibrillation. Thereafter, intubated for airway protection c/b frothy, bloody sputum in ETT requiring K-centra 1500 units, 1 PRBC unit, and txa 1g. As a result, did not receive heparin or plavix. Air lifted to [facility] for further management. Underwent CT head and chest without acute hemorrhagic findings. Remained off sedation where recovered neurological function with following commands and purposeful movements. Received plavix and heparin gtt.      Medications / Drug list prior to admission:  No current facility-administered medications on file prior to encounter.      No current outpatient medications on file prior to encounter.       Current list of administered Medications:    Current Facility-Administered Medications:   •  amiodarone (NEXTERONE) 360 mg/200 mL infusion, 0.5-1 mg/min, Intravenous, Continuous, Poncho Sy Jr., D.O., Last Rate: 33 mL/hr at 08/02/20 1954, 1 mg/min at 08/02/20 1954  •  heparin injection 3,700 Units, 3,700 Units, Intravenous, PRN, 3,700 Units at 08/02/20 2007 **AND** heparin infusion 25,000 units in 500 mL 0.45% NACL, , Intravenous, Continuous, Last Rate: 15 mL/hr at 08/02/20 2010, 750 Units at 08/02/20 2010 **AND** Protocol 209 Heparin Post Fibrinolytic Discontinue Enoxaparin (Lovenox), Dabigatran (Pradaxa), Rivaroxaban (Xarelto), Apixaban (Eliquis), Edoxaban (Savaysa, Lixiana), and Fondaparinux (Arixtra) and Argatroban prior to heparin administration, , , CONTINUOUS **AND** APTT, , , Q6H(S) **AND** [START ON 8/3/2020] APTT, , ,  TOMORROW AM (0300) **AND** RN to place APTT Orders IF, , , CONTINUOUS **AND** Protocol 209 HEPARIN LOADING DOSE - 1000 UNITS/ML GOAL APPROXIMATELY 1000 UNITS/ML, , , CONTINUOUS **AND** Protocol 209 INITIAL HEPARIN INFUSION - 25,000 UNITS  ML OF 0.45% NaCl = 50 UNITS/ML GOAL APPROXIMATELY 12 UNITS/KG/HOUR, , , CONTINUOUS **AND** Protocol 209 HEPARIN ADJUSTMENT RELOAD OR HOLD / RATE CHANGE, , , CONTINUOUS, LAURA Evans Jr..O.  •  Respiratory Therapy Consult, , Nebulization, Continuous RT, LAURA Evans Jr..O.  •  ipratropium-albuterol (DUONEB) nebulizer solution, 3 mL, Nebulization, Q2HRS PRN (RT), LAURA Evans Jr..O.  •  famotidine (PEPCID) tablet 20 mg, 20 mg, Enteral Tube, Q12HRS **OR** famotidine (PEPCID) injection 20 mg, 20 mg, Intravenous, Q12HRS, LAURA Evans Jr..O.  •  MD Alert...ICU Electrolyte Replacement per Pharmacy, , Other, PHARMACY TO DOSE, LAURA Evans Jr..O.  •  lidocaine (XYLOCAINE) 1 % injection 1-2 mL, 1-2 mL, Tracheal Tube, Q30 MIN PRN, Poncho Sy Jr. D.O.  •  insulin regular (HumuLIN R,NovoLIN R) injection, 1-6 Units, Subcutaneous, Q6HRS **AND** POC Blood Glucose, , , Q6H **AND** NOTIFY MD and PharmD, , , Once **AND** glucose 4 g chewable tablet 16 g, 16 g, Oral, Q15 MIN PRN **AND** dextrose 50% (D50W) injection 50 mL, 50 mL, Intravenous, Q15 MIN PRN, Poncho Sy Jr. D.O.  •  senna-docusate (PERICOLACE or SENOKOT S) 8.6-50 MG per tablet 2 Tab, 2 Tab, Enteral Tube, BID **AND** polyethylene glycol/lytes (MIRALAX) PACKET 1 Packet, 1 Packet, Enteral Tube, QDAY PRN **AND** magnesium hydroxide (MILK OF MAGNESIA) suspension 30 mL, 30 mL, Enteral Tube, QDAY PRN **AND** bisacodyl (DULCOLAX) suppository 10 mg, 10 mg, Rectal, QDAY PRN, Poncho Sy Jr., D.O.  •  ondansetron (ZOFRAN) syringe/vial injection 4 mg, 4 mg, Intravenous, Q4HRS PRN, Poncho Sy Jr., D.O.  •  ondansetron (ZOFRAN ODT) dispertab 4 mg, 4 mg, Oral, Q4HRS PRN, Poncho Sy Jr.,  D.O.  •  labetalol (NORMODYNE/TRANDATE) injection 10 mg, 10 mg, Intravenous, Q4HRS PRN, LAURA Evans Jr..O.  •  acetaminophen (TYLENOL) tablet 650 mg, 650 mg, Oral, Q6HRS PRN, Poncho Sy Jr. D.O.  •  [START ON 8/3/2020] aspirin (ASA) tablet 325 mg, 325 mg, Oral, DAILY **OR** [START ON 8/3/2020] aspirin (ASA) chewable tab 324 mg, 324 mg, Oral, DAILY **OR** [START ON 8/3/2020] aspirin (ASA) suppository 300 mg, 300 mg, Rectal, DAILY, Poncho Sy Jr. D.O.  •  metoprolol (LOPRESSOR) tablet 12.5 mg, 12.5 mg, Enteral Tube, TWICE DAILY, LAURA Evans Jr..O.  •  atorvastatin (LIPITOR) tablet 40 mg, 40 mg, Oral, Q EVENING, LAURA Evans Jr..O.  •  sodium bicarbonate 8.4 % 150 mEq in sterile water for inj(pf) 1,000 mL infusion, , Intravenous, Continuous, Poncho Sy Jr., D.O.  No current outpatient medications on file.    Past Medical History:   Diagnosis Date   • Chronic obstructive pulmonary disease (HCC)    • Emphysema of lung (HCC)    • High cholesterol    • Hypothyroid    • Lung cancer (HCC)        History reviewed. No pertinent surgical history.    History reviewed. No pertinent family history.  Patient family history was personally reviewed, no pertinent family history to current presentation    Social History     Socioeconomic History   • Marital status: Not on file     Spouse name: Not on file   • Number of children: Not on file   • Years of education: Not on file   • Highest education level: Not on file   Occupational History   • Not on file   Social Needs   • Financial resource strain: Not on file   • Food insecurity     Worry: Not on file     Inability: Not on file   • Transportation needs     Medical: Not on file     Non-medical: Not on file   Tobacco Use   • Smoking status: Not on file   Substance and Sexual Activity   • Alcohol use: Not on file   • Drug use: Not on file   • Sexual activity: Not on file   Lifestyle   • Physical activity     Days per week: Not on file     Minutes per  session: Not on file   • Stress: Not on file   Relationships   • Social connections     Talks on phone: Not on file     Gets together: Not on file     Attends Yarsani service: Not on file     Active member of club or organization: Not on file     Attends meetings of clubs or organizations: Not on file     Relationship status: Not on file   • Intimate partner violence     Fear of current or ex partner: Not on file     Emotionally abused: Not on file     Physically abused: Not on file     Forced sexual activity: Not on file   Other Topics Concern   • Not on file   Social History Narrative   • Not on file       ALLERGIES:  Allergies   Allergen Reactions   • Sulfa Drugs        Review of systems:  intubated    Physical exam:  Patient Vitals for the past 24 hrs:   BP Temp Temp src Pulse Resp SpO2 Weight   20 110/71 36.2 °C (97.2 °F) Temporal 72 17 -- --   20 110/79 -- -- 75 (!) 21 92 % --   20 (!) 94/64 -- -- 77 -- -- --   20 (!) 176/88 -- -- 76 19 100 % 63.5 kg (140 lb)     General: intubated  EYES: no jaundice  HEENT: intubated   Neck: No bruits No JVD.   CVS:  RRR. S1 + S2. No M/R/G. No edema.  Resp: CTAB. No wheezing or crackles/rhonchi.  Abdomen: Soft, NT, ND,  Skin: Grossly nothing acute no obvious rashes  Neurological: Alert, Moves all extremities, following commands  Extremities: Pulse 2+ in b/l LE. No cyanosis.     Data:  Laboratory studies personally reviewed by me:  Recent Results (from the past 24 hour(s))   EKG    Collection Time: 20  7:16 PM   Result Value Ref Range    Report       [facility]    Test Date:  2020  Pt Name:    HIMANSHU ALEJANDRA                   Department: ER  MRN:        [id number]                      Room:       RD 02  Gender:     Male                         Technician: [id number]  :        1945                   Requested By:LAI CORONA  Order #:    [id number]                    Reading  MD:    Measurements  Intervals                                Axis  Rate:       76                           P:          84  AK:         220                          QRS:        83  QRSD:       126                          T:          105  QT:         344  QTc:        387    Interpretive Statements  SINUS RHYTHM  FIRST DEGREE AV BLOCK  NONSPECIFIC INTRAVENTRICULAR CONDUCTION DELAY  INFERIOR INJURY, PROBABLE EARLY ACUTE INFARCT  BORDERLINE R WAVE PROGRESSION, ANTERIOR LEADS  No previous ECG available for comparison     CBC WITH DIFFERENTIAL    Collection Time: 08/02/20  7:23 PM   Result Value Ref Range    WBC 29.7 (H) 4.8 - 10.8 K/uL    RBC 3.59 (L) 4.70 - 6.10 M/uL    Hemoglobin 11.9 (L) 14.0 - 18.0 g/dL    Hematocrit 37.1 (L) 42.0 - 52.0 %    .3 (H) 81.4 - 97.8 fL    MCH 33.1 (H) 27.0 - 33.0 pg    MCHC 32.1 (L) 33.7 - 35.3 g/dL    RDW 61.2 (H) 35.9 - 50.0 fL    Platelet Count 297 164 - 446 K/uL    MPV 9.3 9.0 - 12.9 fL    Neutrophils-Polys 89.20 (H) 44.00 - 72.00 %    Lymphocytes 3.70 (L) 22.00 - 41.00 %    Monocytes 5.50 0.00 - 13.40 %    Eosinophils 0.20 0.00 - 6.90 %    Basophils 0.40 0.00 - 1.80 %    Immature Granulocytes 1.00 (H) 0.00 - 0.90 %    Nucleated RBC 0.10 /100 WBC    Neutrophils (Absolute) 27.24 (H) 1.82 - 7.42 K/uL    Lymphs (Absolute) 1.13 1.00 - 4.80 K/uL    Monos (Absolute) 1.68 (H) 0.00 - 0.85 K/uL    Eos (Absolute) 0.05 0.00 - 0.51 K/uL    Baso (Absolute) 0.11 0.00 - 0.12 K/uL    Immature Granulocytes (abs) 0.32 (H) 0.00 - 0.11 K/uL    NRBC (Absolute) 0.02 K/uL   BASIC METABOLIC PANEL    Collection Time: 08/02/20  7:23 PM   Result Value Ref Range    Sodium 135 135 - 145 mmol/L    Potassium 5.4 3.6 - 5.5 mmol/L    Chloride 103 96 - 112 mmol/L    Co2 18 (L) 20 - 33 mmol/L    Glucose 188 (H) 65 - 99 mg/dL    Bun 26 (H) 8 - 22 mg/dL    Creatinine 1.26 0.50 - 1.40 mg/dL    Calcium 8.2 (L) 8.5 - 10.5 mg/dL    Anion Gap 14.0 7.0 - 16.0   TROPONIN    Collection Time: 08/02/20  7:23 PM   Result  Value Ref Range    Troponin T 323 (H) 6 - 19 ng/L   proBrain Natriuretic Peptide, NT    Collection Time: 08/02/20  7:23 PM   Result Value Ref Range    NT-proBNP 2024 (H) 0 - 125 pg/mL   PLATELET MAPPING WITH BASIC TEG    Collection Time: 08/02/20  7:23 PM   Result Value Ref Range    Reaction Time Initial-R 4.7 (L) 5.0 - 10.0 min    Clot Kinetics-K 1.8 1.0 - 3.0 min    Clot Angle-Angle 65.3 53.0 - 72.0 degrees    Maximum Clot Strength-MA 61.1 50.0 - 70.0 mm    Lysis 30 minutes-LY30 0.0 0.0 - 8.0 %    % Inhibition ADP see comment %    % Inhibition AA 91.3 %    TEG Algorithm Link Algorithm    APTT    Collection Time: 08/02/20  7:23 PM   Result Value Ref Range    APTT 30.5 24.7 - 36.0 sec   ESTIMATED GFR    Collection Time: 08/02/20  7:23 PM   Result Value Ref Range    GFR If African American >60 >60 mL/min/1.73 m 2    GFR If Non  56 (A) >60 mL/min/1.73 m 2   BASIC TEG W HEPARINASE    Collection Time: 08/02/20  7:23 PM   Result Value Ref Range    React Time Initial Hep 4.2 (L) 5.0 - 10.0 min    Clot Kinetics Heparinase 1.6 1.0 - 3.0 min    Clot Angle Heparinase 67.6 53.0 - 72.0 degrees    Max Clot Heparinase 67.6 50.0 - 70.0 mm    Lysis 30 min Heparinase 0.0 0.0 - 8.0 %   EC-ECHOCARDIOGRAM COMPLETE W/O CONT    Collection Time: 08/02/20  8:32 PM   Result Value Ref Range    Eject.Frac. MOD BP 19.78     Eject.Frac. MOD 4C 15.53     Eject.Frac. MOD 2C 21.47     Left Ventrical Ejection Fraction 35        EKG : personally reviewed by me  Sinus, NIVCD, inferior RUBEN with reciprocal depressions    All pertinent features of laboratory and imaging reviewed including primary images where applicable    TTE 8/2/20  CONCLUSIONS  Intubated. Technically difficult. Only subcostals available.  Moderately reduced left ventricular systolic function. Left ventricular   ejection fraction is visually estimated to be 35%.  Left ventricular hypokinesis of the inferolateral, inferior and   inferoseptal walls.  The right ventricle  was normal in size and function.  Mild tricuspid regurgitation. Estimated right ventricular systolic   pressure  is 55 mmHg; moderate pulmonary hypertension. Right atrial   pressure is estimated to be 8 mmHg.  Normal pericardium without effusion.    Active Problems:    * No active hospital problems. *  Resolved Problems:    * No resolved hospital problems. *      Assessment / Plan:  74 year old man with PMH AF, COPD, lung CA p/w inferior stemi s/p tenekteplase c/b in-hospital cardiac arrest with ventricular fibrillation with ROSC in <2 min, intubated for airway protection c/b hemorrhage ETT requiring K-centra 1500 units, 1 PRBC unit, and txa 1g, air lifted to [facility] for further management.    -urgent LHC  -continue DAPT 12 months  -high intensity statin  -admit to CICU    I personally discussed his case with Dr Hughes and Dr Sy    It is my pleasure to participate in the care of Mr. Howard.  Please do not hesitate to contact me with questions or concerns.    Bebeot Conklin MD  Cardiologist [facility]

## 2020-08-03 NOTE — CARE PLAN
Ventilator Daily Summary    Vent Day # 2    8.0 @ 22    CMV 26, 400, +8, 70%    Ventilator settings changed this shift: Increased RR; Titrated FIO2    Weaning trials: no    Respiratory Procedures: Bronchoscopy performed due to lizbeth blood in ETT. Pt noted to have multiple areas of damaged tissue. Tissue sample obtained and sent to lab.      Plan: Continue current ventilator settings and wean mechanical ventilation as tolerated per physician orders.

## 2020-08-03 NOTE — PROGRESS NOTES
Pulmonary/Critical Care Medicine   Progress Note    Date of service: 8/3/2020  Time: 0118    CODE CEDRIC called for PEA arrest.  Patient has had moderate ongoing hemoptysis, ?  Etiology of cardiac arrest.  2 rounds of ACLS completed with return of spontaneous circulation.  1 L of crystalloid given in resuscitation and 2 PRBCs ordered.  Protamine ordered for heparin reversal.  TXA inhalation has been given.  Repeat CBC with hemoglobin of 7.8.    Post ROSC the patient was localizing to painful stimuli however pupils remain dilated.  CT head ordered.      Poncho Sy Jr., D.O.  [facility]  [id number]

## 2020-08-03 NOTE — CARE PLAN
Adult Ventilation Update    Total Vent Days: 2  26/440/+8/50%    Patient Lines/Drains/Airways Status    Active Airway     Name: Placement date: Placement time: Site: Days:    Airway ETT 8.0  08/02/20 1908   --  2               Static Compliance (ml / cm H2O): 41.1 (08/03/20 1441)  No SBT hemodynamically unstable     Sputum/Suction   Cough: Productive (08/03/20 1105)  Sputum Amount: Moderate (08/03/20 1200)  Sputum Color: El Blood (08/03/20 1200)  Sputum Consistency: Thin (08/03/20 1105)    Mobility  Level of Mobility: Level III (08/03/20 0800)  Activity Performed: Unable to mobilize (08/03/20 0800)  Reason Not Mobilized: Unstable condition (08/03/20 0800)  Mobilization Comments: Post cardiac arrest, multiple pressors, Fem art line (08/03/20 0800)    Events/Summary/Plan: Ventilator Check (08/03/20 0144)

## 2020-08-03 NOTE — ASSESSMENT & PLAN NOTE
- RCA STEMI < 100% occlusion on cath on admission  - S/P KEYLA to RCA, EF 35%  - currently on pressor support but off pressors from 8/7  Plan  - continue ASA, Plavix, statin per Cortrak  - metoprolol 25 bid

## 2020-08-03 NOTE — ASSESSMENT & PLAN NOTE
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

## 2020-08-03 NOTE — ASSESSMENT & PLAN NOTE
Reportedly treated in 2005  3 spiculated masses present on CT chest  Endobronchial mobile tissue mass benign bronchial mucosa, suspect traumatic intubation with bleeding post TNK  Repeat bronchoscopy without any airway lesions noted  Follow-up CT scan 1 to 2 months after recovers

## 2020-08-03 NOTE — PROGRESS NOTES
2 RN Skin Check    2 RN skin check complete.   Devices in place: ETT with holister, BP cuff, defibrillation pads, and Cesar catheter.  Skin assessed under devices: yes.  Confirmed pressure ulcers found on: none.  New potential pressure ulcers noted on none. Wound consult placed No.  The following interventions in place: Pillows, Mepilex and Waffle bed overlay.  Patient has generalized hematomas as well as skin tears and dried blood. Skin cyanotic, mottled, and cold throughout.

## 2020-08-04 NOTE — PROGRESS NOTES
Reason for consultation /admission : Inf STEMI with VF arrest    S/p RCA stent 8/2  Residual 70% Left main and  LCX  EF 35%  Hemoptysis (s/p t-PA), remote lung CA (2005) post chemo RX  Bronchoscopy and CT showing lung masses    No VT prob >48 hours still viktor IV amiodarone  On low dose norepinephrine  Intubated/sedated    Review of systems; unable to perform      Temp:  [34.7 °C (94.5 °F)-37.1 °C (98.8 °F)] 37.1 °C (98.8 °F)  Pulse:  [58-84] 63  Resp:  [17-30] 30  BP: ()/() 118/64  SpO2:  [89 %-100 %] 99 %  GENERAL not in acute distress, not dyspnic at rest  Head atraumatic, normocephalic  Eyes EOMI  ENT neck supple, no JVD, no carotid bruits or thyromegaly  Lung good expansion, distant sound, no rales or wheezing  Heart RRR, normal rate, no murmur,   no gallop or rub  Abd soft, no tenderness, mass or bruits  Ext no edema  Skin no ecchymosis or petechiae  Musculoskeletal no deformity  Neuro grossly intact  Psych normal mood, normal affect    Monitor reviewed by me showed no significant ventricular or atrial dysrhythmias.    Labs: Cr 3.5, K+ 1.1  Hb 12.8    Assessment and plans     1. Inf STEMI D#3 s/p RCA stent  still on vasopressor but at lower dose  No sig arrhythmias  EF 35%  On DAPT  Has residual  prox LCX and around 70% eccentric calcified left main which may nolt be suitable for PCI and require CABG if revascularization deems appropriate depend on lung mass issue  Start low dose betablocker as BP allows     2. Hemoptysis/lung mass and respiratory failure  Per primary  Hemoptysis appears to have stopped  Would prefer to keep DAPT for now   Pathology pending     3. OOH VF arrest  No recurrence since admission, will d/c IV amiodarone later today  Appear to have no major neurological deficit     4. Anemia likely due to #2  Improved post transfusion  per primary     Discussed with Dr. Cali  He is considering repeated bronchoscopy    Will follow    Please note that this dictation was created using  voice recognition software. I have worked with consultants from the vendor as well as technical experts from [facility] to optimize the interface. I have made every reasonable attempt to correct obvious errors, but I expect that there are errors of grammar and possibly content I did not discover before finalizing the note

## 2020-08-04 NOTE — CARE PLAN
Adult Ventilation Update    Total Vent Days:3    Patient Lines/Drains/Airways Status      Active Airway       Name: Placement date: Placement time: Site: Days:    Airway ETT 8.0  08/02/20 1908   --  1                    In the last 24 hours, the patient tolerated SBT for 4 hours on settings of 5/8     Static Compliance (ml / cm H2O): 17.9 (08/04/20 1448)       Cough: Moist;Productive (08/04/20 1445)  Sputum Amount: Moderate (08/04/20 1445)  Sputum Color: Bloody (08/04/20 1445)  Sputum Consistency: Thick;Thin (08/04/20 1445)    Mobility  Level of Mobility: Level III (08/03/20 0800)  Activity Performed: Unable to mobilize (08/03/20 0800)  Reason Not Mobilized: Unstable condition(HOTN, severe drop with movement) (08/04/20 0126)  Mobilization Comments: Attempting to begin mobility the patients blood pressured dropped dramatically.  (08/03/20 2200)    Events/Summary/Plan: placed back on cmv (08/04/20 1448)

## 2020-08-04 NOTE — PROGRESS NOTES
Discussed plan of care in interdisciplinary rounds. Okay to removed arterial line as waveform is dampened and reading is inaccurate, orders to take BP on R arm. Plan for bronchoscopy today. Possible extubation, MD to reassess after rounds. No other needs at this time.

## 2020-08-04 NOTE — PROGRESS NOTES
[facility] Progress Note      Admit Date: 8/2/2020    Resident(s): Igor Aldana M.D.  Attending: JIA VAN/ Dr. Gaitan    Date & Time:   8/4/2020   1:27 PM       Patient ID:    Name:             Sukhi Howard     YOB: 1945  Age:                 74 y.o.  male   MRN:               [id number]    HPI:  74 yr old male with PMHx of DLD, hypothyroidism, COPD, lung cancer S/P chemotherapy in 2005, presented 8/2/20 as a transfer from [facility] where he presented for sudden retrosternal chest pain 8/10, radiating to the left arm, found to have an inferior STEMI, received tenecteplase, following which he had an episode of Vfib needing defibrillation and 1 round of ACLS. He was then intubated with RSI, following which high volume hemoptysis was noted necessitating TXA and Kcentra. He was started on Amiodarone gtt, Propofol, obtunded on admission with GCS 3. He underwent a bronchoscopy on admission significant for a right mainstem bronchus friable mass which was removed and sent to path. GCS improved to 11T, he was taken to the cath lab and underwent PCI with KEYLA to RCA. Early on 8/3/20 AM he had PEA and Code Blue was called with him attaining ROSC after Epinephrine and 1 round of ACLS. He was also noted to be leukocytic and hypothermic to 35 C, being warmed with bear hugger. He is currently on Precedex, Levophed, Epinephrine, Fentanyl, weaned off Dopamine, on tube feeding per an oral Cortrak. He is currently intubated on CMV with RR 26, PEEP 8, FiO2 50%, triggering his own breaths. Bronchoscopy performed today revealed no mucosal lesion, but was positive for old blood in the airway.    Consultants:  Cardiology  PMA:     Interval Events:  - was able to communicate this AM and able to follow commands when sedation weaned off  - currently sedated on Fentanyl and Precedex gtt, MAP>65 on Levophed gtt, weaned off of Dopamine  - Vent settings - CMV, RR 26, PEEP 8, FiO2 0.5  - TTE with EF 35%, currently on  "pressors, BB, ACEi not started, on ASA, statin, Plavix  - on bicarb gtt for metabolic acidosis  - Amiodarone gtt to be discontinued as no further ventricular arrhythmias  - Pepcid for GI ppx  - + 6.4 L since admit, UOP: 28.75 ml/hr, 0.4 ml/kg/hr, UOP increasing, renal function improving          Review of Systems   Unable to perform ROS: Critical illness       PHYSICAL EXAM  Vitals:    08/04/20 0945 08/04/20 1000 08/04/20 1015 08/04/20 1059   BP: (!) 79/53 (!) 97/56     Pulse: 87 81 78 82   Resp: 18 (!) 10 (!) 11 (!) 11   Temp:       TempSrc:       SpO2:  92% 99% 100%   Weight:       Height:         Body mass index is 27.32 kg/m².  BP (!) 97/56   Pulse 82   Temp 37 °C (98.6 °F)   Resp (!) 11   Ht 1.626 m (5' 4\")   Wt 72.2 kg (159 lb 2.8 oz)   SpO2 100%   BMI 27.32 kg/m²   O2 therapy: Pulse Oximetry: 100 %, O2 Delivery Device: Ventilator    Physical Exam   Constitutional: He is well-developed, well-nourished, and in no distress. No distress.   HENT:   Head: Normocephalic and atraumatic.   Right Ear: External ear normal.   Left Ear: External ear normal.   Nose: Nose normal.   Mouth/Throat: No oropharyngeal exudate.   Eyes: Conjunctivae and EOM are normal. Right eye exhibits no discharge. Left eye exhibits no discharge. No scleral icterus.   Neck: Normal range of motion. Neck supple. No JVD present.   Cardiovascular: Normal rate, regular rhythm and normal heart sounds.   No murmur heard.  Pulmonary/Chest: Effort normal and breath sounds normal. No respiratory distress. He has no wheezes.   Abdominal: Soft. Bowel sounds are normal. He exhibits no distension. There is no guarding.   Genitourinary:    Genitourinary Comments: deferred     Musculoskeletal:         General: No edema.   Neurological:   GCS 7 (intubated)   Skin: Skin is warm and dry. He is not diaphoretic. No erythema.   pallor   Psychiatric:   deferred   Nursing note and vitals reviewed.      Respiratory:  Vent Mode: Spont  Respiration: (!) 11, Pulse " Oximetry: 100 %    Chest Tube Drains:    Recent Labs     08/03/20  0128 08/03/20  0350 08/04/20  0347   ISTATAPH 7.172* 7.350* 7.456   ISTATAPCO2 51.3* 38.8* 37.9*   ISTATAPO2 277* 306* 108*   ISTATATCO2 20 23 28   RONWMPR7JLV 100* 100* 98   ISTATARTHCO3 18.8 21.5 26.7*   ISTATARTBE -10* -4 3   ISTATTEMP 34.0 C 32.5 C 96.0 F   ISTATFIO2 100 100 60   ISTATSPEC Arterial Arterial Arterial   ISTATAPHTC 7.212* 7.415 7.478   DMMGRIDX3QC 263* 285* 99*       HemoDynamics:  Pulse: 82 Blood Pressure : (!) 97/56, Arterial BP: (!) 61/55     Neuro:      Fluids:  Date 08/04/20 0700 - 08/05/20 0659   Shift 3097-6932 9832-7543 1130-3437 24 Hour Total   INTAKE   I.V. 334.7   334.7     Precedex Volume 7.9   7.9     Amiodarone Volume 132   132     Verapamil Volume 2   2     Norepinephrine Volume 157.5   157.5     Epinephrine Volume 0   0     Volume (mL) (SODIUM CHLORIDE 0.9 % IV SOLN) 35.3   35.3   NG/GT 80   80     Intake (mL) (Enteral Tube 08/03/20 Cortrak - Gastric 10 Fr. Oral) 80   80   IV Piggyback 1541.3   1541.3     Volume (mL) (sodium bicarbonate 8.4 % 150 mEq in sterile water for inj(pf) 1,000 mL infusion) 1328   1328     Volume (mL) (ampicillin/sulbactam (UNASYN) 3 g in  mL IVPB) 213.3   213.3     Volume (mL) (calcium CHLORIDE 1,000 mg in D5W 100 mL IVPB) 0   0   Enteral 30   30     Free Water / Tube Flush 30   30   Shift Total 1986 1986   OUTPUT   Shift Total       NET 1986 1986        Intake/Output Summary (Last 24 hours) at 8/3/2020 1302  Last data filed at 8/3/2020 1200  Gross per 24 hour   Intake 2256.65 ml   Output 420 ml   Net 1836.65 ml       Weight: 72.2 kg (159 lb 2.8 oz)  Body mass index is 27.32 kg/m².    Recent Labs     08/03/20  0345 08/03/20  1721 08/04/20  0240   SODIUM 141 142 141   POTASSIUM 4.0 4.0 3.5*   CHLORIDE 104 103 102   CO2 18* 22 24   BUN 26* 26* 24*   CREATININE 1.35 1.23 1.14   MAGNESIUM 2.5 1.9 2.5   PHOSPHORUS 6.0*  --  3.2   CALCIUM 6.9* 7.8* 8.3*       GI/Nutrition:  Recent Labs      20  0025 20  0345 20  1721 20  0240   ALTSGPT 55*  --   --   --    ASTSGOT 192*  --   --   --    ALKPHOSPHAT 64  --   --   --    TBILIRUBIN 0.6  --   --   --    PREALBUMIN  --   --   --  11.8*   GLUCOSE 202* 209* 124* 141*       Heme:  Recent Labs     20  1923  20  0135 20  0345 20  0610 20  1158 20  0240   RBC 3.59*  --  2.36* 4.03*  --   --  4.13*   HEMOGLOBIN 11.9*   < > 7.8* 12.4* 12.6* 12.6* 12.8*   HEMATOCRIT 37.1*  --  24.2* 37.8*  --   --  37.1*   PLATELETCT 297  --  205 206  --   --  217   APTT 30.5  --   --   --   --   --   --     < > = values in this interval not displayed.       Infectious Disease:  Monitored Temp 2  Av.1 °C (98.8 °F)  Min: 33.3 °C (91.9 °F)  Max: 37.9 °C (100.2 °F)  Temp  Av.9 °C (98.4 °F)  Min: 36.7 °C (98.1 °F)  Max: 37 °C (98.6 °F)  Recent Labs     20  0135 20  0345 20  0240   WBC  --  21.3* 28.7* 17.0*   NEUTSPOLYS  --  80.90* 88.80* 86.70*   LYMPHOCYTES  --  11.60* 5.00* 5.60*   MONOCYTES  --  6.40 5.10 6.50   EOSINOPHILS  --  0.00 0.10 0.30   BASOPHILS  --  0.10 0.30 0.50   ASTSGOT 192*  --   --   --    ALTSGPT 55*  --   --   --    ALKPHOSPHAT 64  --   --   --    TBILIRUBIN 0.6  --   --   --        Meds:  • famotidine  20 mg     • norepinephrine (Levophed) infusion  0-30 mcg/min 6 mcg/min (20 1203)   • Pharmacy  1 Each     • aspirin  81 mg     • atorvastatin  40 mg     • acetaminophen  650 mg     • clopidogrel  75 mg     • ampicillin-sulbactam (UNASYN) IV  3 g Stopped (20 1230)   • amiodarone infusion  0.5-1 mg/min 0.5 mg/min (20 1306)   • Respiratory Therapy Consult       • ipratropium-albuterol  3 mL     • MD Alert...Adult ICU Electrolyte Replacement per Pharmacy       • lidocaine  1-2 mL     • senna-docusate  2 Tab      And   • polyethylene glycol/lytes  1 Packet      And   • magnesium hydroxide  30 mL      And   • bisacodyl  10 mg     • labetalol  10 mg      • EPINEPHrine (Adrenalin) infusion  0-20 mcg/min Stopped (08/03/20 1634)   • DOPamine 1600 mcg/mL in D5W  0-20 mcg/kg/min Stopped (08/03/20 0333)   • fentaNYL  100 mcg      And   • fentaNYL  200 mcg      And   • fentaNYL   Stopped (08/04/20 1203)    And   • dexmedetomidine (PRECEDEX) infusion  0-1.5 mcg/kg/hr 1.5 mcg/kg/hr (08/04/20 1126)        Procedures:  8/2/20 - cardiac catheterization  8/2/20 - bronchoscopy  8/3/20 - RIJ CVC insertion  8/3/20 - arterial line insertion    Imaging:  DX-CHEST-PORTABLE (1 VIEW)   Final Result         1.  Interstitial pulmonary parenchymal prominence suggest chronic underlying lung disease, component of interstitial edema and/or infiltrates not excluded. Stable since prior study   2.  Small bilateral pleural effusions, decreased on the right since prior study   3.  Hyperexpansion of lungs favors changes of COPD.   4.  Atherosclerosis      DX-ABDOMEN FOR TUBE PLACEMENT   Final Result      Feeding tube tip at the distal stomach.      DX-CHEST-LIMITED (1 VIEW)   Final Result         1.  Interstitial pulmonary parenchymal prominence suggest chronic underlying lung disease, component of interstitial edema and/or infiltrates not excluded. Stable since prior study   2.  Left upper lobe nodular density, see dedicated CT performed August 2, 2020 for further characterization.   3.  Small bilateral pleural effusions   4.  Hyperexpansion of lungs favors changes of COPD.   5.  Atherosclerosis   6.  Right internal jugular central line is seen, there is no new central line appreciated since prior study.      DX-ABDOMEN FOR TUBE PLACEMENT   Final Result         1.  Nonspecific bowel gas pattern.   2.  Nasogastric tube tip terminates overlying the expected location of the pylorus or first duodenal segment.      DX-CHEST-PORTABLE (1 VIEW)   Final Result         1.  Interstitial pulmonary parenchymal prominence suggest chronic underlying lung disease, component of interstitial edema and/or  infiltrates not excluded.   2.  Left upper lobe nodular density, see dedicated CT performed today for further characterization.   3.  Small bilateral pleural effusions   4.  Hyperexpansion of lungs favors changes of COPD.   5.  Atherosclerosis      EC-ECHOCARDIOGRAM COMPLETE W/O CONT   Final Result      CT-CHEST (THORAX) WITH   Final Result      1.  Diffuse bullous emphysematous changes of the chest with 3 areas of spiculation, 2 in the left upper lobe, and one in the right lower lobe.      2.  Differential diagnosis includes lung carcinoma versus acute and chronic inflammatory changes associated with bullous disease.      3.  Comparison with prior imaging, if available, is recommended. Alternatively PET/CT or biopsy may be of value to exclude lung carcinoma.      4.  No thoracic adenopathy.      5.  Bibasilar areas of atelectasis or pneumonia.      6.  No significant pleural effusion.      7.  No evidence of acute hemorrhage identified.      Fleischner Society pulmonary nodule recommendations:         CT-HEAD W/O   Final Result      1.  No acute intracranial process.      2.  Atrophy and small vessel ischemic change.      CL-LEFT HEART CATHETERIZATION WITH POSSIBLE INTERVENTION    (Results Pending)       Assessment and Plan:      * Acute ST elevation myocardial infarction (STEMI) due to occlusion of right coronary artery (HCC)- (present on admission)  Assessment & Plan  - RCA STEMI< 100% occlusion on cath on admission  - S/P KEYLA to RCA, EF 35%  - currently on pressor support  Plan  - ASA, Plavix, statin per Cortrak  - holding off on BB, ACEi at this time due to ongoing need for pressor support  - Cardiology following, appreciate recs    Coronary artery disease due to calcified coronary lesion- (present on admission)  Assessment & Plan  - multivessel disease - 100% RCA occlusion, 100% LCx occlusion, left main 70% stenosis, LAD 30%  - S/P KEYLA to RCA, possible reperfusion injury manifested by PEA, attained  ROSC  Plan  - Cardiology following  - ASA, Plavix, statin  - to start BB, ACEi as able    Hemoptysis- (present on admission)  Assessment & Plan  - large volume hemoptysis after intubation and tenecteplase  - finding on bronchoscopy of friable left mainstem bronchus mass, sent to path  - hx of lung cancer, S/P chemo in 2005  - repeat bronchoscopy 8/4, evident old blood but no mucosal lesion, likely primary hemoptysis in setting of traumatic intubation and tenecteplase as opposed to intrabronchial lesion  Plan  - await path on mass, BAL fluid studies  - transfuse to keep Hb>7    Ventricular fibrillation (HCC)- (present on admission)  Assessment & Plan  - Vfib sustained when patient was being worked up for STEMI, likely from arrhythmogenic myocardium when hypoxic  Plan  - continue Amiodarone gtt at 0.5 mg/min    Cardiac arrest ([facility])- (present on admission)  Assessment & Plan  - underlying ischemic heart disease, multivessel disease, initially received tenecteplase at outside facility, had Vfib, attained ROSC with 1 round of ACLS  - subsequently had PEA after cath at [facility], attained ROSC with Epi and one round of ACLS  - currently on Amiodarone gtt  Plan  - Amiodarone down to 0.5 mg/min  - watch for post cath/ MI complications with continuous telemetry monitoring    Ischemic cardiomyopathy (HCC)- (present on admission)  Assessment & Plan  - EF 35% at the time of cardiac catheterization during admission  - currently on ASA, Plavix, statin, holding off on ACEi, aldosterone inhibitor and beta blocker due to continued need for pressor suppport    Metabolic acidosis- (present on admission)  Assessment & Plan  - from hypoperfusion, currently on bicarbonate gtt    Lung cancer (HCC)  Assessment & Plan  - in 2005, S/P chemo, recent hemoptysis and friable mass on bronch raising suspicion for recurrence  - repeat bronchoscopy 8/4 with no evidence of endobronchial lesion/ mass, evident only old blood lavaged and sent for  studies  - unlikely recurrence of lung cancer  Plan  - follow path on mass found during bronch 8/3    COPD (chronic obstructive pulmonary disease) (HCC)- (present on admission)  Assessment & Plan  - underlying COPD, unclear if he is on supplemental O2 support  - currently intubated with CMV mode, triggering 100% of his own breaths  Plan  - wean off vent PRN, down on FiO2 as able, ABG PRN to assess oxygenation    Leukocytosis- (present on admission)  Assessment & Plan  - likely stress reaction, patient also hypothermic but this could be from hypoperfusion from cardiac arrest  - trending down to normal range, now normothermic  Plan  - Unasyn to cover for aspiration PNA    Encephalopathy acute- (present on admission)  Assessment & Plan  - likely toxic, metabolic, possibly hypoxic/ anoxic during Vfib/ PEA but he was alert and able to communicate this AM  - CTM, avoid prolonged hypoxia    Hyperglycemia- (present on admission)  Assessment & Plan  - BG<200, SSI      DISPO: continued ICU level of care for pressor support, hemodynamic monitoring, intubated currently    CODE STATUS: Full code    Quality Measures:  Cesar Catheter: 300 ml output since admit  DVT Prophylaxis: contraindicated, recent hemoptysis  Ulcer Prophylaxis: Pepcid  Antibiotics: Unasyn  Lines: PIV lines, RIJ CVC    This note was created using voice recognition software. There may be unintended errors in spelling, grammar or content.

## 2020-08-04 NOTE — PROGRESS NOTES
Dr. Gaitan updated on pt increased MA interval orders to recheck BMP, mag, and ionized calcium received

## 2020-08-04 NOTE — THERAPY
PT cardiac rehab consult received. Pt is currently intubated. Spoke with RN and plan to await extubation to perform full cardiac rehab evaluation and education. Will initiate PT evaluation as medically appropriate.     Mame Rehman, PT, DPT Phone: [phone]

## 2020-08-04 NOTE — CARE PLAN
Ventilator Daily Summary    Vent Day #3    Ventilator settings changed this shift: fio2 decreased to 50%    Weaning trials: no    Respiratory Procedures: not at this time    Plan: Continue current ventilator settings and wean mechanical ventilation as tolerated per physician orders.

## 2020-08-04 NOTE — PROGRESS NOTES
Dr. Gaitan at bedside to complete bronchoscopy. Time out completed at 1149. Bronchoscopy completed at 1155

## 2020-08-04 NOTE — PROGRESS NOTES
Possible 6 bt vtach or burst of afib rvr, Dr Gaitan updated, increase Amio to 1mg/hr, will readdress in AM

## 2020-08-04 NOTE — PROCEDURES
"Date of Service: 8/4/2020    Procedure:  Diagnostic and therapeutic bronchoscopy with BAL    Indication: Respiratory failure, hemoptysis,? pneumonia versus endobronchial mass lesion    Physician:  Dr. Eduardo Gaitan MD    Post Procedure Diagnosis:  1.  Friable airways with moderate old blood in the airways, no evidence of active bleeding  2.  Otherwise normal endobronchial anatomy with no evidence of endobronchial mass lesions or airway obstruction  3.  BAL left upper lobe sent for culture and cytology    Narrative:  Appropriate consent was obtained and \"time out\" was performed.  A flexible fiberoptic bronchoscope was then inserted through the ETT without difficulty.  All airways were evaluated to the sub-segmental level.  The airway mucosa was inflamed and friable throughout.  There was no evidence of clear active mucosal or segmental airway bleeding but there was a moderate amount of thin old dark blood in the airways which was lavaged clear.  The right lower lobe and left lower lobe airway segments were therapeutically lavaged with small aliquots of saline clearing some thin old blood.  No endobronchial lesions were seen.  The bronchoscope was then wedged in a segment of the left upper lobe bronchus.  30 cc of saline was instilled with moderate return of thin, dark bloody BAL fluid.  The BAL specimen will be sent for appropriate culture.  No immediate complications.  EBL = 0.      Eduardo Gaitan M.D.        "

## 2020-08-04 NOTE — PROGRESS NOTES
"Critical Care Progress Note    Date of admission  8/2/2020    Chief Complaint  74 y.o. male admitted 8/2/2020 with cardiac arrest, STEMI, resp failure    Hospital Course    74 y.o. male with a past medical history of hypercholesterolemia, hypothyroidism, COPD, lung cancer status post chemotherapy in 2005 who presented 8/2/2020 as a transfer from [facility] where he presented today following the sudden onset of retrosternal crushing chest pain which was rated at that time as 8 out of 10 and radiating to the left arm.  He was found to have an inferior ST elevation myocardial infarction and received tenecteplase for treatment.  He subsequently had a reperfusion ventricular fibrillation which required defibrillation and 1 round of ACLS.  He was intubated with RSI at 1615, following this intubation hemoptysis was noted and was reported to the transport team as \"a liter and a half of serosanguinous fluid\".  He was given TXA and Kcentra, for attempted reversal of TNKase, it is unknown if the outside facility has cryoprecipitate.  He was started on amiodarone and propofol and transferred to our facility for higher level of care.  Upon arrival in emergency permit the patient is obtunded with a GCS of 3 on propofol infusion.  Myself, ERP and cardiology at bedside on patient's arrival.  A stat head CT in the ER does not reveal any acute changes, CBC in the ER shows significant leukocytosis at 29.7, hemoglobin 11.9; metabolic panel with a CO2 of 18, glucose 188, BUN 26, creatinine 1.26, anion gap 14.  The patient was observed in the ER and GCS improved to 11T therefore he was taken emergently to Cath Lab for intervention of his STEMI.\"    8/3 - brief PEA arrest ~ 1 am, received additional crystalloid and PRBCs given protamine for heparin reversal, inhaled TXA.  Hemoglobin had dropped to 7.8 and received 2U PRBCs, full ventilator support, titrating down epinephrine and norepinephrine infusions, arouses and follows commands.  " WBC 28.7, suspect stress reaction but Unasyn initiated for possible pneumonia/aspiration.   8/4- some ongoing bloody secretions from ET tube; repeat bronchoscopy with old blood and airways lavaged without evidence of active bleeding, BAL sent for culture.  On/off norepinephrine infusion, off epinephrine.  Stop bicarbonate infusion today.  Not yet ready for extubation but awake and following commands.  Okay to stop amiodarone per cardiology          Interval Problem Update  Reviewed last 24 hour events:  Follows, int agitation   SR, 1st deg AVB  Off Norepi (10 all night)  Tm = 3.2/690 (net +4.5 L since admission)  jayshree TF   Dex/fent gtts; SAT  Amio 1  Vent day 3: 26/360/50  SBT jayshree today; repeat now -23, FVC  CXR RL atx  No dvt p  DAPT  Pepcid  Unasyn day 2/7  BAL rare GPC  Replace K  Bronch today for f/u airway mass  Stop bicarb drip today    Review of Systems  Review of Systems   Unable to perform ROS: Intubated        Vital Signs for last 24 hours   Temp:  [36.3 °C (97.3 °F)-37.1 °C (98.8 °F)] 36.7 °C (98.1 °F)  Pulse:  [58-84] 64  Resp:  [8-30] 8  BP: ()/() 119/63  SpO2:  [89 %-100 %] 99 %    Hemodynamic parameters for last 24 hours       Respiratory Information for the last 24 hours  Vent Mode: APVCMV  Rate (breaths/min): 26  Vt Target (mL): 360  PEEP/CPAP: 8  MAP: 14  Control VTE (exp VT): 353    Physical Exam   Physical Exam  Vitals signs and nursing note reviewed.   Constitutional:       Appearance: He is well-developed. He is ill-appearing.      Interventions: He is intubated.   HENT:      Head: Normocephalic and atraumatic.      Right Ear: External ear normal.      Left Ear: External ear normal.      Nose: Nose normal.      Mouth/Throat:      Mouth: Mucous membranes are moist.      Comments: ET tube in position, moderate bloody secretions in the ETT/tubing  Eyes:      Conjunctiva/sclera: Conjunctivae normal.      Comments: Pupils 5 mm and nonreactive   Neck:      Musculoskeletal: Neck supple.       Vascular: No JVD.      Trachea: No tracheal deviation.   Cardiovascular:      Rate and Rhythm: Normal rate and regular rhythm.      Pulses: Normal pulses.      Comments: Remains on vasopressors, sinus rhythm  Pulmonary:      Effort: He is intubated.      Breath sounds: Rales present. No wheezing.      Comments: Full ventilator support  Chest:      Comments: Barrel chested  Abdominal:      General: Bowel sounds are normal. There is no distension.      Palpations: Abdomen is soft.   Musculoskeletal:         General: No tenderness.   Skin:     General: Skin is dry.      Capillary Refill: Capillary refill takes 2 to 3 seconds.      Findings: No rash.      Comments: Slight clubbing; Multiple contusions to bilateral forearms   Neurological:      Comments: Arouses, follows commands moves all extremities appropriately   Psychiatric:      Comments: Unable to assess         Medications  Current Facility-Administered Medications   Medication Dose Route Frequency Provider Last Rate Last Dose   • norepinephrine (Levophed) infusion 8 mg/250 mL (premix)  0-30 mcg/min Intravenous Continuous Poncho Sy Jr., D.O. 18.8 mL/hr at 08/04/20 0752 10 mcg/min at 08/04/20 0752   • Pharmacy Consult: Enteral tube insertion - review meds/change route/product selection  1 Each Other PHARMACY TO DOSE Eduardo Gaitan M.D.       • aspirin (ASA) chewable tab 81 mg  81 mg Enteral Tube DAILY Eduardo Gaitan M.D.   81 mg at 08/04/20 0527   • atorvastatin (LIPITOR) tablet 40 mg  40 mg Enteral Tube Q EVENING Eduardo Gaitan M.D.   40 mg at 08/03/20 1734   • acetaminophen (TYLENOL) tablet 650 mg  650 mg Enteral Tube Q6HRS PRN Eduardo Gaitan M.D.       • clopidogrel (PLAVIX) tablet 75 mg  75 mg Enteral Tube DAILY Eduardo Gaitan M.D.   75 mg at 08/04/20 0527   • famotidine (PEPCID) tablet 20 mg  20 mg Enteral Tube DAILY Eduardo Gaitan M.D.        Or   • famotidine (PEPCID) injection 20 mg  20 mg Intravenous DAILY Eduardo Gaitan M.D.   20 mg at  08/04/20 0527   • ampicillin/sulbactam (UNASYN) 3 g in  mL IVPB  3 g Intravenous Q6HRS Igor Aldana M.D.   Stopped at 08/04/20 0557   • amiodarone (NEXTERONE) 360 mg/200 mL infusion  0.5-1 mg/min Intravenous Continuous LAURA Evans Jr..O. 33 mL/hr at 08/04/20 0528 1 mg/min at 08/04/20 0528   • Respiratory Therapy Consult   Nebulization Continuous RT LAURA Evans Jr..O.       • ipratropium-albuterol (DUONEB) nebulizer solution  3 mL Nebulization Q2HRS PRN (RT) Poncho Sy Jr., D.O.       • MD Alert...ICU Electrolyte Replacement per Pharmacy   Other PHARMACY TO DOSE LAURA Evans Jr..JONA.       • lidocaine (XYLOCAINE) 1 % injection 1-2 mL  1-2 mL Tracheal Tube Q30 MIN PRN LEA Evans Jr.O.       • senna-docusate (PERICOLACE or SENOKOT S) 8.6-50 MG per tablet 2 Tab  2 Tab Enteral Tube BID LAURA Evans Jr..O.   2 Tab at 08/04/20 0527    And   • polyethylene glycol/lytes (MIRALAX) PACKET 1 Packet  1 Packet Enteral Tube QDAY PRN LAURA Evans Jr..OSalma        And   • magnesium hydroxide (MILK OF MAGNESIA) suspension 30 mL  30 mL Enteral Tube QDAY PRN Poncho Sy Jr., D.O.        And   • bisacodyl (DULCOLAX) suppository 10 mg  10 mg Rectal QDAY PRN LAURA Evans Jr..O.       • labetalol (NORMODYNE/TRANDATE) injection 10 mg  10 mg Intravenous Q4HRS PRN LEA Evans Jr.O.       • sodium bicarbonate 8.4 % 150 mEq in sterile water for inj(pf) 1,000 mL infusion   Intravenous Continuous LAURA Evans Jr..O. 83 mL/hr at 08/04/20 0211     • EPINEPHrine (Adrenalin) infusion 4 mg/250 mL (premix)  0-20 mcg/min Intravenous Continuous LAURA Evans Jr..OSalma   Stopped at 08/03/20 1634   • DOPamine 1600 mcg/mL in D5W premix  0-20 mcg/kg/min Intravenous Continuous LAURA Evans Jr..LICO   Stopped at 08/03/20 0333   • fentaNYL (SUBLIMAZE) injection 100 mcg  100 mcg Intravenous Q15 MIN PRN LAURA Evans Jr..O.   100 mcg at 08/03/20 1438    And   • fentaNYL  (SUBLIMAZE) injection 200 mcg  200 mcg Intravenous Q15 MIN PRN LAURA Evans Jr..OSalma   200 mcg at 08/04/20 0600    And   • fentaNYL (SUBLIMAZE) 50 mcg/mL in 50mL (Continuous Infusion)   Intravenous Continuous LAURA Evans Jr..OSalma   Stopped at 08/04/20 0636    And   • dexmedetomidine (PRECEDEX) 400 mcg/100mL NS premix infusion  0-1.5 mcg/kg/hr Intravenous Continuous LAURA Evans Jr..O.   Stopped at 08/04/20 0637       Fluids    Intake/Output Summary (Last 24 hours) at 8/4/2020 0859  Last data filed at 8/4/2020 0800  Gross per 24 hour   Intake 4733.62 ml   Output 610 ml   Net 4123.62 ml       Laboratory  Recent Labs     08/03/20  0128 08/03/20  0350 08/04/20  0347   ISTATAPH 7.172* 7.350* 7.456   ISTATAPCO2 51.3* 38.8* 37.9*   ISTATAPO2 277* 306* 108*   ISTATATCO2 20 23 28   SSUBSTP3XSI 100* 100* 98   ISTATARTHCO3 18.8 21.5 26.7*   ISTATARTBE -10* -4 3   ISTATTEMP 34.0 C 32.5 C 96.0 F   ISTATFIO2 100 100 60   ISTATSPEC Arterial Arterial Arterial   ISTATAPHTC 7.212* 7.415 7.478   [id number] 263* 285* 99*         Recent Labs     08/03/20  0345 08/03/20  1721 08/04/20  0240   SODIUM 141 142 141   POTASSIUM 4.0 4.0 3.5*   CHLORIDE 104 103 102   CO2 18* 22 24   BUN 26* 26* 24*   CREATININE 1.35 1.23 1.14   MAGNESIUM 2.5 1.9 2.5   PHOSPHORUS 6.0*  --  3.2   CALCIUM 6.9* 7.8* 8.3*     Recent Labs     08/03/20  0025 08/03/20  0345 08/03/20  1721 08/04/20  0240   ALTSGPT 55*  --   --   --    ASTSGOT 192*  --   --   --    ALKPHOSPHAT 64  --   --   --    TBILIRUBIN 0.6  --   --   --    PREALBUMIN  --   --   --  11.8*   GLUCOSE 202* 209* 124* 141*     Recent Labs     08/03/20  0025 08/03/20  0135 08/03/20  0345 08/04/20  0240   WBC  --  21.3* 28.7* 17.0*   NEUTSPOLYS  --  80.90* 88.80* 86.70*   LYMPHOCYTES  --  11.60* 5.00* 5.60*   MONOCYTES  --  6.40 5.10 6.50   EOSINOPHILS  --  0.00 0.10 0.30   BASOPHILS  --  0.10 0.30 0.50   ASTSGOT 192*  --   --   --    ALTSGPT 55*  --   --   --    ALKPHOSPHAT 64  --   --   --     TBILIRUBIN 0.6  --   --   --      Recent Labs     08/02/20  1923  08/03/20  0135 08/03/20  0345 08/03/20  0610 08/03/20  1158 08/04/20  0240   RBC 3.59*  --  2.36* 4.03*  --   --  4.13*   HEMOGLOBIN 11.9*   < > 7.8* 12.4* 12.6* 12.6* 12.8*   HEMATOCRIT 37.1*  --  24.2* 37.8*  --   --  37.1*   PLATELETCT 297  --  205 206  --   --  217   APTT 30.5  --   --   --   --   --   --     < > = values in this interval not displayed.       Imaging  X-Ray:  I have personally reviewed the images and compared with prior images.    Assessment/Plan  * Acute ST elevation myocardial infarction (STEMI) due to occlusion of right coronary artery (HCC)- (present on admission)  Assessment & Plan  Taken to Cath Lab on 8/2/2020, KEYLA placed in RCA  Cardiology following  DAPT  Echocardiogram this morning  Monitor for arrhythmias  Statin  beta-blocker when able    Hemoptysis- (present on admission)  Assessment & Plan  Likely due to friable endobronchial mass versus tracheal trauma during intubation  Bronchoscopy not consistent with diffuse alveolar hemorrhage  Inhaled TXA  Protamine for heparin reversal  Trend hemoglobin, transfuse to maintain hemoglobin greater than 8    Ventricular fibrillation (HCC)- (present on admission)  Assessment & Plan  At outside hospital  Continue amiodarone    Cardiac arrest (HCC)- (present on admission)  Assessment & Plan  Reportedly V. fib arrest at outside hospital, ? Reperfusion following TNKase  Continue amiodarone  Monitor for recurrent arrhythmias, optimize electrolytes    Ischemic cardiomyopathy (HCC)- (present on admission)  Assessment & Plan  Left ventricular ejection fraction of 35%  Inotrope for inotropic support  Careful volume resuscitation  Will need beta-blocker, ACE inhibitor and diuresis when able    Metabolic acidosis- (present on admission)  Assessment & Plan  Bicarbonate infusion  Continue resuscitation    Lung cancer (HCC)  Assessment & Plan  Reportedly treated in 2005  3 spiculated masses  present on CT chest  Endobronchial mobile tissue mass on bronchoscopy, follow-up pathology    COPD (chronic obstructive pulmonary disease) (HCC)- (present on admission)  Assessment & Plan  Not in acute exacerbation  RT/O2 Protocols  Titrate supplemental FiO2 to maintain SpO2 >92%    Leukocytosis- (present on admission)  Assessment & Plan  Likely reactive  Monitor for signs of infection, low threshold to initiate antimicrobials    Encephalopathy acute- (present on admission)  Assessment & Plan  Limit sedatives  GCS improved to 11T, limit sedatives not a temperature management candidate    Hyperglycemia- (present on admission)  Assessment & Plan  Goal blood glucose 120-180  sliding scale insulin, accuchecks  hypoglycemia protocol     Updated plan:  Full ventilator support, tolerated SBT but not yet appropriate for liberation, ventilator adjustments made  Complete antibiotics  Repeat bronchoscopy today with old blood in the airways, no evidence of active bleeding or airway lesion  Repeat BAL sent for cytology and cultures  Titrated vasopressors  Following commands after out-of-hospital VF arrest  Okay to discontinue amiodarone per cardiology with no prior evidence of VF  Continue DAPT for RCA stent  Residual  left circumflex, not planning any further PCI, consider CVS eval after improved      VTE:  Contraindicated  Ulcer: H2 Antagonist  Lines: Central Line  Ongoing indication addressed    I have performed a physical exam and reviewed and updated ROS and Plan today (8/4/2020). In review of yesterday's note (8/3/2020), there are no changes except as documented above.     Discussed patient condition and risk of morbidity and/or mortality with RN, RT, Pharmacy and [facility] resident  The patient remains critically ill.  Critical care time = 38 minutes in directly providing and coordinating critical care and extensive data review.  No time overlap and excludes procedures.

## 2020-08-04 NOTE — PROGRESS NOTES
Received report and assumed pt care. Pt is intubated, currently off of sedation. Pt is on levo for BP support, all other VSS. Pt intermittently follows commands. Bilateral soft wrist restraints. Q2 hour turns in place.

## 2020-08-04 NOTE — ASSESSMENT & PLAN NOTE
- EF 35% at the time of cardiac catheterization during admission  - currently on ASA, Plavix, statin, Metoprolol

## 2020-08-04 NOTE — DISCHARGE PLANNING
Care Transition Team Discharge Planning    Anticipated Discharge Disposition: TBD    Action: Per AM rounds, pt was transferred from [facility]. Pt has stents placed. Pt is able to follow and off sedation. At times, pt becomes upset. Pt is vent day 3 and may be extubated.    Barriers to Discharge: TBD    Plan: f/u w/ medical team, etc.

## 2020-08-05 PROBLEM — Z85.118 HISTORY OF LUNG CANCER: Status: ACTIVE | Noted: 2020-01-01

## 2020-08-05 NOTE — DIETARY
Nutrition Services: Brief update    Admit day 3.  TF initiated 8/3. Current TF recommendations: Impact Peptide 1.5, goal rate 40 ml/hr to provide 1440 kcal, 90 grams protein, and 739 ml free water per day.  TF currently infusing @ 30 ml/hr per slow advancement recommendations.  Metabolic cart study completed by RT 8/3. REE = 2091 kcal/day (strong/steady study). Will need to increase caloric provisions to meet needs.   Protein needs: 1.5+ grams/kg/day = 107+ grams protein/day.  Reviewed labs and meds.   +7.7 L fluid per I/O. Low-volume TF formula remains indicated.     Recommend: Increase TF Impact Peptide 1.5 to new goal rate 55 ml/hr to provide 1980 kcal (28 kcal/kg), 124 grams protein (1.7 grams protein/kg), 1016 ml free water per day.    RD following.

## 2020-08-05 NOTE — CARE PLAN
Problem: Ventilation Defect:  Goal: Ability to achieve and maintain unassisted ventilation or tolerate decreased levels of ventilator support  Outcome: PROGRESSING SLOWER THAN EXPECTED      Ventilator Daily Summary    Vent Day #4    Ventilator settings changed this shift: RR to 16, VT to 480,     Weaning trials: in process    Respiratory Procedures:    Plan: Continue current ventilator settings and wean mechanical ventilation as tolerated per physician orders.

## 2020-08-05 NOTE — PROGRESS NOTES
Reason for consultation /admission : Inf STEMI with VF arrest    Repeat bronchoscopy result showed no mass  On low dose epinephrine  Still intubated, being weaned  No further hemoptysis  off amiodarone, no VT/VF yesterday  Hb down slightly    Review of systems;  Unable to perform (sedtaed/intubated)    Temp:  [35.6 °C (96 °F)-37.6 °C (99.7 °F)] 35.9 °C (96.7 °F)  Pulse:  [] 83  Resp:  [6-30] 16  BP: ()/(43-80) 93/54  SpO2:  [55 %-100 %] 100 %  GENERAL not in acute distress, not dyspnic at rest  Head atraumatic, normocephalic  Eyes EOMI  ENT neck supple, no JVD, no carotid bruits or thyromegaly  Lung good expansion, distant sound, no rales or wheezing  Heart RRR, normal rate, no murmur,   no gallop or rub  Abd soft, no tenderness, mass or bruits  Ext no edema  Skin no ecchymosis or petechiae  Musculoskeletal no deformity  Neuro sedated    Monitor reviewed by me showed no significant ventricular or atrial dysrhythmias.    Labs: Cr 1, K+ 3.3  Hb 10 WBC 13    Assessment and plans    1. Inf STEMI D#4 s/p RCA stent  Has residual  prox LCX and around 70% eccentric calcified left main which may nolt be suitable for PCI and require CABG if revascularization.   Will reassess when more stable as long as he remains stable  On vasopressor but at lower dose  No sig arrhythmias  EF 35%  On DAPT  Start low dose betablocker as BP allows     2. Hemoptysis, resolved  No lung mass seen  Per primary  Pathology pending     3. OOH VF arrest  No recurrence off IV amiodarone      4. Anemia likely due to #2  Improved post transfusion  per primary     Discussed with Dr. Cali      Will follow    Please note that this dictation was created using voice recognition software. I have worked with consultants from the vendor as well as technical experts from [employer] to optimize the interface. I have made every reasonable attempt to correct obvious errors, but I expect that there are errors of grammar and possibly content I did  not discover before finalizing the note

## 2020-08-05 NOTE — PROGRESS NOTES
Received report and assumed pt care. Pt is intubated, sedation currently off. Pt is lethargic and not following commands at this time. Pt on low dose levo for BP support, all other VSS.

## 2020-08-05 NOTE — CARE PLAN
Problem: Nutritional:  Goal: Nutrition support tolerated and meeting greater than 85% of estimated needs  Outcome: PROGRESSING AS EXPECTED     TF @ 30 ml/hr; new goal rate is 55 ml/hr.

## 2020-08-05 NOTE — PROGRESS NOTES
"Critical Care Progress Note    Date of admission  8/2/2020    Chief Complaint  74 y.o. male admitted 8/2/2020 with cardiac arrest, STEMI, resp failure    Hospital Course    74 y.o. male with a past medical history of hypercholesterolemia, hypothyroidism, COPD, lung cancer status post chemotherapy in 2005 who presented 8/2/2020 as a transfer from [facility] where he presented today following the sudden onset of retrosternal crushing chest pain which was rated at that time as 8 out of 10 and radiating to the left arm.  He was found to have an inferior ST elevation myocardial infarction and received tenecteplase for treatment.  He subsequently had a reperfusion ventricular fibrillation which required defibrillation and 1 round of ACLS.  He was intubated with RSI at 1615, following this intubation hemoptysis was noted and was reported to the transport team as \"a liter and a half of serosanguinous fluid\".  He was given TXA and Kcentra, for attempted reversal of TNKase, it is unknown if the outside facility has cryoprecipitate.  He was started on amiodarone and propofol and transferred to our facility for higher level of care.  Upon arrival in emergency permit the patient is obtunded with a GCS of 3 on propofol infusion.  Myself, ERP and cardiology at bedside on patient's arrival.  A stat head CT in the ER does not reveal any acute changes, CBC in the ER shows significant leukocytosis at 29.7, hemoglobin 11.9; metabolic panel with a CO2 of 18, glucose 188, BUN 26, creatinine 1.26, anion gap 14.  The patient was observed in the ER and GCS improved to 11T therefore he was taken emergently to Cath Lab for intervention of his STEMI.\"    8/3 - brief PEA arrest ~ 1 am, received additional crystalloid and PRBCs given protamine for heparin reversal, inhaled TXA.  Hemoglobin had dropped to 7.8 and received 2U PRBCs, full ventilator support, titrating down epinephrine and norepinephrine infusions, arouses and follows commands.  " WBC 28.7, suspect stress reaction but Unasyn initiated for possible pneumonia/aspiration.   8/4- some ongoing bloody secretions from ET tube; repeat bronchoscopy with old blood and airways lavaged without evidence of active bleeding, BAL sent for culture.  On/off norepinephrine infusion, off epinephrine.  Stop bicarbonate infusion today.  Not yet ready for extubation but awake and following commands.  Okay to stop amiodarone per cardiology  8/5 -SBT but not appropriate for liberation, weaning sedation, advancing tube feed, low-dose norepinephrine, consider diuresis soon, remains on Unasyn with negative cultures to date.  Less bloody secretions in ET tube today status post bronchoscopy yesterday with no clear site for bleeding.          Interval Problem Update  Reviewed last 24 hour events:  SAT/SBT today  Dex, fent 300  HI improving  SR 70  norepi 2  jayshree TF - advancing, bowel protocol  Int follows  Vent day 4: rate 16, 45%  CXR reviewed  I/O = 3.1/855  DAPT/statin  Unasyn 3/7  BAL NRF  BAL nor orgs   Replace k and phos      Review of Systems  Review of Systems   Unable to perform ROS: Intubated        Vital Signs for last 24 hours   Temp:  [35.6 °C (96 °F)-37.6 °C (99.7 °F)] 35.6 °C (96 °F)  Pulse:  [] 73  Resp:  [6-30] 18  BP: ()/() 85/47  SpO2:  [55 %-100 %] 100 %    Hemodynamic parameters for last 24 hours       Respiratory Information for the last 24 hours  Vent Mode: APVCMV  Rate (breaths/min): 16  Vt Target (mL): 480  PEEP/CPAP: 8  MAP: 13  Length of Weaning Trial (Hours): 5 mins  Control VTE (exp VT): 479    Physical Exam   Physical Exam  Vitals signs and nursing note reviewed.   Constitutional:       Appearance: He is well-developed. He is ill-appearing.      Interventions: He is intubated.   HENT:      Head: Normocephalic and atraumatic.      Right Ear: External ear normal.      Left Ear: External ear normal.      Nose: Nose normal.      Mouth/Throat:      Mouth: Mucous membranes are  moist.      Comments: ET tube in position, moderate bloody secretions in the ETT/tubing  Eyes:      Conjunctiva/sclera: Conjunctivae normal.      Comments: Pupils 5 mm and nonreactive   Neck:      Musculoskeletal: Neck supple.      Vascular: No JVD.      Trachea: No tracheal deviation.   Cardiovascular:      Rate and Rhythm: Normal rate and regular rhythm.      Pulses: Normal pulses.      Comments: Remains on vasopressors, sinus rhythm  Pulmonary:      Effort: He is intubated.      Breath sounds: Rales present. No wheezing.      Comments: Full ventilator support  Chest:      Comments: Barrel chested  Abdominal:      General: Bowel sounds are normal. There is no distension.      Palpations: Abdomen is soft.   Musculoskeletal:         General: No tenderness.   Skin:     General: Skin is dry.      Capillary Refill: Capillary refill takes 2 to 3 seconds.      Findings: No rash.      Comments: Slight clubbing; Multiple contusions to bilateral forearms   Neurological:      Comments: Arouses, follows commands moves all extremities appropriately   Psychiatric:      Comments: Unable to assess         Medications  Current Facility-Administered Medications   Medication Dose Route Frequency Provider Last Rate Last Dose   • ipratropium-albuterol (DUONEB) nebulizer solution  3 mL Nebulization Q4HRS (RT) Harsha Chavis M.D.   3 mL at 08/05/20 0630   • sodium phosphate 15 mmol in D5W 250 mL ivpb  15 mmol Intravenous Once Eduardo Gaitan M.D. 62.5 mL/hr at 08/05/20 0751 15 mmol at 08/05/20 0751   • famotidine (PEPCID) tablet 20 mg  20 mg Enteral Tube BID Eduardo Gaitan M.D.   20 mg at 08/05/20 0546   • norepinephrine (Levophed) infusion 8 mg/250 mL (premix)  0-30 mcg/min Intravenous Continuous Poncho Sy Jr., D.O. 3.8 mL/hr at 08/05/20 0740 2 mcg/min at 08/05/20 0740   • Pharmacy Consult: Enteral tube insertion - review meds/change route/product selection  1 Each Other PHARMACY TO DOSE Eduardo Gaitan M.D.       • aspirin  (ASA) chewable tab 81 mg  81 mg Enteral Tube DAILY Eduardo Gaitan M.D.   81 mg at 08/05/20 0545   • atorvastatin (LIPITOR) tablet 40 mg  40 mg Enteral Tube Q EVENING Eduardo Gaitan M.D.   40 mg at 08/04/20 1711   • acetaminophen (TYLENOL) tablet 650 mg  650 mg Enteral Tube Q6HRS PRN Eduardo Gaitan M.D.       • clopidogrel (PLAVIX) tablet 75 mg  75 mg Enteral Tube DAILY Eduardo Gaitan M.D.   75 mg at 08/05/20 0546   • ampicillin/sulbactam (UNASYN) 3 g in  mL IVPB  3 g Intravenous Q6HRS Igor Aldana M.D.   Stopped at 08/05/20 0615   • Respiratory Therapy Consult   Nebulization Continuous RT LAURA Evans Jr..O.       • ipratropium-albuterol (DUONEB) nebulizer solution  3 mL Nebulization Q2HRS PRN (RT) LAURA Evans Jr..OSalma   3 mL at 08/05/20 0240   • MD Alert...ICU Electrolyte Replacement per Pharmacy   Other PHARMACY TO DOSE LAURA Evans Jr..O.       • lidocaine (XYLOCAINE) 1 % injection 1-2 mL  1-2 mL Tracheal Tube Q30 MIN PRN LAURA Evans Jr..O.       • senna-docusate (PERICOLACE or SENOKOT S) 8.6-50 MG per tablet 2 Tab  2 Tab Enteral Tube BID LAURA Evans Jr..O.   2 Tab at 08/05/20 0545    And   • polyethylene glycol/lytes (MIRALAX) PACKET 1 Packet  1 Packet Enteral Tube QDAY PRN LAURA Evans Jr..O.   1 Packet at 08/04/20 1725    And   • magnesium hydroxide (MILK OF MAGNESIA) suspension 30 mL  30 mL Enteral Tube QDAY PRN LAURA Evans Jr..OSalma        And   • bisacodyl (DULCOLAX) suppository 10 mg  10 mg Rectal QDAY PRN LAURA Evans Jr..O.       • labetalol (NORMODYNE/TRANDATE) injection 10 mg  10 mg Intravenous Q4HRS PRN Poncho M Yossi Jr., D.O.       • fentaNYL (SUBLIMAZE) injection 100 mcg  100 mcg Intravenous Q15 MIN PRN Poncho Sy Jr. D.O.   100 mcg at 08/04/20 2042    And   • fentaNYL (SUBLIMAZE) injection 200 mcg  200 mcg Intravenous Q15 MIN PRN Poncho Sy Jr. D.O.   200 mcg at 08/04/20 0600    And   • fentaNYL (SUBLIMAZE) 50 mcg/mL in 50mL  (Continuous Infusion)   Intravenous Continuous Poncho Sy Jr., D.O. 6 mL/hr at 08/04/20 2246 300 mcg/hr at 08/04/20 2246    And   • dexmedetomidine (PRECEDEX) 400 mcg/100mL NS premix infusion  0-1.5 mcg/kg/hr Intravenous Continuous Poncho Sy Jr., D.O. 9.5 mL/hr at 08/05/20 0400 0.6 mcg/kg/hr at 08/05/20 0400       Fluids    Intake/Output Summary (Last 24 hours) at 8/5/2020 0806  Last data filed at 8/5/2020 0600  Gross per 24 hour   Intake 1956.28 ml   Output 855 ml   Net 1101.28 ml       Laboratory  Recent Labs     08/03/20  0350 08/04/20  0347 08/05/20  0249   ISTATAPH 7.350* 7.456 7.465   ISTATAPCO2 38.8* 37.9* 41.2*   ISTATAPO2 306* 108* 60*   ISTATATCO2 23 28 31   [id number] 100* 98 92*   ISTATARTHCO3 21.5 26.7* 29.6*   ISTATARTBE -4 3 5*   ISTATTEMP 32.5 C 96.0 F 36.0 C   ISTATFIO2 100 60 40   ISTATSPEC Arterial Arterial Arterial   ISTATAPHTC 7.415 7.478 7.480   [id number] 285* 99* 56*         Recent Labs     08/03/20  0345  08/04/20  0240 08/04/20  1505 08/05/20  0345   SODIUM 141   < > 141 139 140   POTASSIUM 4.0   < > 3.5* 4.0 3.3*   CHLORIDE 104   < > 102 99 101   CO2 18*   < > 24 29 29   BUN 26*   < > 24* 29* 29*   CREATININE 1.35   < > 1.14 1.19 1.00   MAGNESIUM 2.5   < > 2.5 2.1 2.0   PHOSPHORUS 6.0*  --  3.2  --  2.1*   CALCIUM 6.9*   < > 8.3* 7.7* 7.5*    < > = values in this interval not displayed.     Recent Labs     08/03/20  0025  08/04/20  0240 08/04/20  1505 08/05/20 0345   ALTSGPT 55*  --   --   --   --    ASTSGOT 192*  --   --   --   --    ALKPHOSPHAT 64  --   --   --   --    TBILIRUBIN 0.6  --   --   --   --    PREALBUMIN  --   --  11.8*  --   --    GLUCOSE 202*   < > 141* 125* 131*    < > = values in this interval not displayed.     Recent Labs     08/03/20  0025  08/03/20 0345 08/04/20  0240 08/05/20 0345   WBC  --    < > 28.7* 17.0* 13.5*   NEUTSPOLYS  --    < > 88.80* 86.70* 86.20*   LYMPHOCYTES  --    < > 5.00* 5.60* 5.20*   MONOCYTES  --    < > 5.10 6.50 7.30    EOSINOPHILS  --    < > 0.10 0.30 0.70   BASOPHILS  --    < > 0.30 0.50 0.20   ASTSGOT 192*  --   --   --   --    ALTSGPT 55*  --   --   --   --    ALKPHOSPHAT 64  --   --   --   --    TBILIRUBIN 0.6  --   --   --   --     < > = values in this interval not displayed.     Recent Labs     08/02/20  1923  08/03/20  0345  08/03/20  1158 08/04/20  0240 08/05/20  0345   RBC 3.59*   < > 4.03*  --   --  4.13* 3.33*   HEMOGLOBIN 11.9*   < > 12.4*   < > 12.6* 12.8* 10.5*   HEMATOCRIT 37.1*   < > 37.8*  --   --  37.1* 31.4*   PLATELETCT 297   < > 206  --   --  217 155*   APTT 30.5  --   --   --   --   --   --     < > = values in this interval not displayed.       Imaging  X-Ray:  I have personally reviewed the images and compared with prior images.    Assessment/Plan  * Acute ST elevation myocardial infarction (STEMI) due to occlusion of right coronary artery (HCC)- (present on admission)  Assessment & Plan  Taken to Cath Lab on 8/2/2020, KEYLA placed in RCA  Cardiology following  DAPT  Echocardiogram this morning  Monitor for arrhythmias  Statin  beta-blocker when able    Hemoptysis- (present on admission)  Assessment & Plan  Likely due to friable endobronchial mass versus tracheal trauma during intubation  Bronchoscopy not consistent with diffuse alveolar hemorrhage  Inhaled TXA  Protamine for heparin reversal  Trend hemoglobin, transfuse to maintain hemoglobin greater than 8    Ventricular fibrillation (HCC)- (present on admission)  Assessment & Plan  At outside hospital  Continue amiodarone    Cardiac arrest (HCC)- (present on admission)  Assessment & Plan  Reportedly V. fib arrest at outside hospital, ? Reperfusion following TNKase  Continue amiodarone  Monitor for recurrent arrhythmias, optimize electrolytes    Ischemic cardiomyopathy (HCC)- (present on admission)  Assessment & Plan  Left ventricular ejection fraction of 35%  Inotrope for inotropic support  Careful volume resuscitation  Will need beta-blocker, ACE  inhibitor and diuresis when able    Metabolic acidosis- (present on admission)  Assessment & Plan  Bicarbonate infusion  Continue resuscitation    Lung cancer (HCC)  Assessment & Plan  Reportedly treated in 2005  3 spiculated masses present on CT chest  Endobronchial mobile tissue mass on bronchoscopy, follow-up pathology    COPD (chronic obstructive pulmonary disease) (HCC)- (present on admission)  Assessment & Plan  Not in acute exacerbation  RT/O2 Protocols  Titrate supplemental FiO2 to maintain SpO2 >92%    Leukocytosis- (present on admission)  Assessment & Plan  Likely reactive  Monitor for signs of infection, low threshold to initiate antimicrobials    Encephalopathy acute- (present on admission)  Assessment & Plan  Limit sedatives  GCS improved to 11T, limit sedatives not a temperature management candidate    Hyperglycemia- (present on admission)  Assessment & Plan  Goal blood glucose 120-180  sliding scale insulin, accuchecks  hypoglycemia protocol     Updated plan:  Failed SBT today and not yet ready for liberation.  Ventilator settings adjusted and decreasing support.  Continues on vasopressors, low-dose, considering diuretic therapy soon  Off amiodarone  Continue antibiotics, follow-up cultures, negative to date  Continue DAPT for RCA stent      VTE:  Contraindicated  Ulcer: H2 Antagonist  Lines: Central Line  Ongoing indication addressed    I have performed a physical exam and reviewed and updated ROS and Plan today (8/5/2020). In review of yesterday's note (8/4/2020), there are no changes except as documented above.     Discussed patient condition and risk of morbidity and/or mortality with RN, RT, Pharmacy and [facility] resident  The patient remains critically ill.  Critical care time = 34 minutes in directly providing and coordinating critical care and extensive data review.  No time overlap and excludes procedures.

## 2020-08-05 NOTE — PROGRESS NOTES
[facility] Progress Note      Admit Date: 8/2/2020    Resident(s): Igor Aldana M.D.  Attending: JIA VAN/ Dr. Gaitan    Date & Time:   8/5/2020   2:04 PM       Patient ID:    Name:             Sukhi Howard     YOB: 1945  Age:                 74 y.o.  male   MRN:               [id number]    HPI:  74 yr old male with PMHx of DLD, hypothyroidism, COPD, lung cancer S/P chemotherapy in 2005, presented 8/2/20 as a transfer from [facility] where he presented for sudden retrosternal chest pain 8/10, radiating to the left arm, found to have an inferior STEMI, received tenecteplase, following which he had an episode of Vfib needing defibrillation and 1 round of ACLS. He was then intubated with RSI, following which high volume hemoptysis was noted necessitating TXA and Kcentra. He was started on Amiodarone gtt, Propofol, obtunded on admission with GCS 3. He underwent a bronchoscopy on admission significant for a right mainstem bronchus friable mass which was removed and sent to path. GCS improved to 11T, he was taken to the cath lab and underwent PCI with KEYLA to RCA. Early on 8/3/20 AM he had PEA and Code Blue was called with him attaining ROSC after Epinephrine and 1 round of ACLS. He was also noted to be leukocytic and hypothermic to 35 C, being warmed with bear hugger. He is currently on Precedex, Levophed, Fentanyl, on tube feeding per an oral Cortrak. He is currently intubated on CMV with RR 16, PEEP 8, FiO2 45%. Bronchoscopy performed 8/4 revealed no mucosal lesions, but was positive for old blood in the airway.    Consultants:  Cardiology  PMA:     Interval Events:  - awake and alert this AM, able to follow commands, not spontaneously triggering vent even off sedation  - failed SBTs this AM  - episode of SVT/ST this afternoon, resolved with aborting SBT and increasing sedation  - currently sedated on Fentanyl and Precedex gtt, MAP>65 on Levophed gtt  - Vent settings - CMV, RR 16, PEEP 8, FiO2  "0.45  - TTE with EF 35%, currently on pressors, BB, ACEi not started, on ASA, statin, Plavix  - Amiodarone gtt discontinued  - Pepcid for GI ppx  - +8.2 L since admit, UOP 0.50 ml/kg/hr          Review of Systems   Unable to perform ROS: Critical illness       PHYSICAL EXAM  Vitals:    08/05/20 1100 08/05/20 1130 08/05/20 1200 08/05/20 1344   BP: 112/58 (!) 99/54 118/56    Pulse: 94 89 92 (!) 104   Resp: 18 16 18 16   Temp:   37.7 °C (99.9 °F)    TempSrc:   Bladder    SpO2: 100% 100% 100% 100%   Weight:       Height:         Body mass index is 27.06 kg/m².  /56   Pulse (!) 104   Temp 37.7 °C (99.9 °F) (Bladder)   Resp 16   Ht 1.626 m (5' 4\")   Wt 71.5 kg (157 lb 10.1 oz)   SpO2 100%   BMI 27.06 kg/m²   O2 therapy: Pulse Oximetry: 100 %, O2 Delivery Device: Ventilator    Physical Exam   Constitutional: He is well-developed, well-nourished, and in no distress. No distress.   HENT:   Head: Normocephalic and atraumatic.   Right Ear: External ear normal.   Left Ear: External ear normal.   Nose: Nose normal.   Mouth/Throat: No oropharyngeal exudate.   Eyes: Conjunctivae and EOM are normal. Right eye exhibits no discharge. Left eye exhibits no discharge. No scleral icterus.   Neck: Normal range of motion. Neck supple. No JVD present.   Cardiovascular: Normal rate, regular rhythm and normal heart sounds.   No murmur heard.  Pulmonary/Chest: Effort normal and breath sounds normal. No respiratory distress. He has no wheezes.   Abdominal: Soft. Bowel sounds are normal. He exhibits no distension. There is no guarding.   Genitourinary:    Genitourinary Comments: deferred     Musculoskeletal:         General: No edema.   Neurological:   GCS E3 V(NT) M6   Skin: Skin is warm and dry. He is not diaphoretic. No erythema.   pallor   Psychiatric:   deferred   Nursing note and vitals reviewed.      Respiratory:  Vent Mode: APVCMV  Respiration: 16, Pulse Oximetry: 100 %    Chest Tube Drains:    Recent Labs     08/03/20  0350 " 08/04/20  0347 08/05/20  0249   ISTATAPH 7.350* 7.456 7.465   ISTATAPCO2 38.8* 37.9* 41.2*   ISTATAPO2 306* 108* 60*   ISTATATCO2 23 28 31   QUDAUQK5ETH 100* 98 92*   ISTATARTHCO3 21.5 26.7* 29.6*   ISTATARTBE -4 3 5*   ISTATTEMP 32.5 C 96.0 F 36.0 C   ISTATFIO2 100 60 40   ISTATSPEC Arterial Arterial Arterial   ISTATAPHTC 7.415 7.478 7.480   [id number] 285* 99* 56*       HemoDynamics:  Pulse: (!) 104 Blood Pressure : 118/56     Neuro:      Fluids:  Date 08/05/20 0700 - 08/06/20 0659   Shift 5945-4284 3659-7067 0686-2878 24 Hour Total   INTAKE   I.V. 95.6   95.6     Precedex Volume 76   76     Norepinephrine Volume 19.6   19.6   NG/   180     Intake (mL) (Enteral Tube 08/03/20 Cortrak - Gastric 10 Fr. Oral) 180   180   IV Piggyback 550   550     Volume (mL) (sodium phosphate 15 mmol in D5W 250 mL ivpb) 250   250     Volume (mL) (ampicillin/sulbactam (UNASYN) 3 g in  mL IVPB) 300   300   Enteral 180   180     Free Water / Tube Flush 180   180   Shift Total 1005.6   1005.6   OUTPUT   Shift Total       NET 1005.6   1005.6        Intake/Output Summary (Last 24 hours) at 8/3/2020 1302  Last data filed at 8/3/2020 1200  Gross per 24 hour   Intake 2256.65 ml   Output 420 ml   Net 1836.65 ml       Weight: 71.5 kg (157 lb 10.1 oz)  Body mass index is 27.06 kg/m².    Recent Labs     08/03/20  0345  08/04/20  0240 08/04/20  1505 08/05/20  0345   SODIUM 141   < > 141 139 140   POTASSIUM 4.0   < > 3.5* 4.0 3.3*   CHLORIDE 104   < > 102 99 101   CO2 18*   < > 24 29 29   BUN 26*   < > 24* 29* 29*   CREATININE 1.35   < > 1.14 1.19 1.00   MAGNESIUM 2.5   < > 2.5 2.1 2.0   PHOSPHORUS 6.0*  --  3.2  --  2.1*   CALCIUM 6.9*   < > 8.3* 7.7* 7.5*    < > = values in this interval not displayed.       GI/Nutrition:  Recent Labs     08/03/20  0025  08/04/20  0240 08/04/20  1505 08/05/20  0345   ALTSGPT 55*  --   --   --   --    ASTSGOT 192*  --   --   --   --    ALKPHOSPHAT 64  --   --   --   --    TBILIRUBIN 0.6  --   --   --    --    PREALBUMIN  --   --  11.8*  --   --    GLUCOSE 202*   < > 141* 125* 131*    < > = values in this interval not displayed.       Heme:  Recent Labs     20  1923  20  0345  20  1158 20   RBC 3.59*   < > 4.03*  --   --  4.13* 3.33*   HEMOGLOBIN 11.9*   < > 12.4*   < > 12.6* 12.8* 10.5*   HEMATOCRIT 37.1*   < > 37.8*  --   --  37.1* 31.4*   PLATELETCT 297   < > 206  --   --  217 155*   APTT 30.5  --   --   --   --   --   --     < > = values in this interval not displayed.       Infectious Disease:  Monitored Temp 2  Av.8 °C (100 °F)  Min: 37.1 °C (98.8 °F)  Max: 38 °C (100.4 °F)  Temp  Av.5 °C (97.7 °F)  Min: 35.6 °C (96 °F)  Max: 37.7 °C (99.9 °F)  Recent Labs     20  0025  20   WBC  --    < > 28.7* 17.0* 13.5*   NEUTSPOLYS  --    < > 88.80* 86.70* 86.20*   LYMPHOCYTES  --    < > 5.00* 5.60* 5.20*   MONOCYTES  --    < > 5.10 6.50 7.30   EOSINOPHILS  --    < > 0.10 0.30 0.70   BASOPHILS  --    < > 0.30 0.50 0.20   ASTSGOT 192*  --   --   --   --    ALTSGPT 55*  --   --   --   --    ALKPHOSPHAT 64  --   --   --   --    TBILIRUBIN 0.6  --   --   --   --     < > = values in this interval not displayed.       Meds:  • ipratropium-albuterol  3 mL     • famotidine  20 mg     • norepinephrine (Levophed) infusion  0-30 mcg/min 2 mcg/min (20 0740)   • Pharmacy  1 Each     • aspirin  81 mg     • atorvastatin  40 mg     • acetaminophen  650 mg     • clopidogrel  75 mg     • ampicillin-sulbactam (UNASYN) IV  3 g Stopped (20 4628)   • Respiratory Therapy Consult       • ipratropium-albuterol  3 mL     • MD Alert...Adult ICU Electrolyte Replacement per Pharmacy       • lidocaine  1-2 mL     • senna-docusate  2 Tab      And   • polyethylene glycol/lytes  1 Packet      And   • magnesium hydroxide  30 mL      And   • bisacodyl  10 mg     • labetalol  10 mg     • fentaNYL  100 mcg      And   • fentaNYL  200 mcg       And   • fentaNYL   150 mcg/hr (08/05/20 3406)    And   • dexmedetomidine (PRECEDEX) infusion  0-1.5 mcg/kg/hr 0.6 mcg/kg/hr (08/05/20 7070)        Procedures:  8/2/20 - cardiac catheterization  8/2/20 - bronchoscopy  8/3/20 - RIJ CVC insertion  8/3/20 - arterial line insertion    Imaging:  DX-CHEST-PORTABLE (1 VIEW)   Final Result         1.  Interstitial pulmonary parenchymal prominence suggest chronic underlying lung disease, component of interstitial edema and/or infiltrates not excluded. Stable since prior study   2.  Small bilateral pleural effusions, stable since prior study   3.  Hyperexpansion of lungs favors changes of COPD.   4.  Atherosclerosis         DX-CHEST-PORTABLE (1 VIEW)   Final Result         1.  Interstitial pulmonary parenchymal prominence suggest chronic underlying lung disease, component of interstitial edema and/or infiltrates not excluded. Stable since prior study   2.  Small bilateral pleural effusions, decreased on the right since prior study   3.  Hyperexpansion of lungs favors changes of COPD.   4.  Atherosclerosis      DX-ABDOMEN FOR TUBE PLACEMENT   Final Result      Feeding tube tip at the distal stomach.      DX-CHEST-LIMITED (1 VIEW)   Final Result         1.  Interstitial pulmonary parenchymal prominence suggest chronic underlying lung disease, component of interstitial edema and/or infiltrates not excluded. Stable since prior study   2.  Left upper lobe nodular density, see dedicated CT performed August 2, 2020 for further characterization.   3.  Small bilateral pleural effusions   4.  Hyperexpansion of lungs favors changes of COPD.   5.  Atherosclerosis   6.  Right internal jugular central line is seen, there is no new central line appreciated since prior study.      DX-ABDOMEN FOR TUBE PLACEMENT   Final Result         1.  Nonspecific bowel gas pattern.   2.  Nasogastric tube tip terminates overlying the expected location of the pylorus or first duodenal segment.       DX-CHEST-PORTABLE (1 VIEW)   Final Result         1.  Interstitial pulmonary parenchymal prominence suggest chronic underlying lung disease, component of interstitial edema and/or infiltrates not excluded.   2.  Left upper lobe nodular density, see dedicated CT performed today for further characterization.   3.  Small bilateral pleural effusions   4.  Hyperexpansion of lungs favors changes of COPD.   5.  Atherosclerosis      EC-ECHOCARDIOGRAM COMPLETE W/O CONT   Final Result      CT-CHEST (THORAX) WITH   Final Result      1.  Diffuse bullous emphysematous changes of the chest with 3 areas of spiculation, 2 in the left upper lobe, and one in the right lower lobe.      2.  Differential diagnosis includes lung carcinoma versus acute and chronic inflammatory changes associated with bullous disease.      3.  Comparison with prior imaging, if available, is recommended. Alternatively PET/CT or biopsy may be of value to exclude lung carcinoma.      4.  No thoracic adenopathy.      5.  Bibasilar areas of atelectasis or pneumonia.      6.  No significant pleural effusion.      7.  No evidence of acute hemorrhage identified.      Fleischner Society pulmonary nodule recommendations:         CT-HEAD W/O   Final Result      1.  No acute intracranial process.      2.  Atrophy and small vessel ischemic change.      CL-LEFT HEART CATHETERIZATION WITH POSSIBLE INTERVENTION    (Results Pending)       Assessment and Plan:      * Acute ST elevation myocardial infarction (STEMI) due to occlusion of right coronary artery (HCC)- (present on admission)  Assessment & Plan  - RCA STEMI< 100% occlusion on cath on admission  - S/P KEYLA to RCA, EF 35%  - currently on pressor support  Plan  - ASA, Plavix, statin per Cortrak  - holding off on BB, ACEi at this time due to ongoing need for pressor support  - Cardiology following, appreciate recs    Coronary artery disease due to calcified coronary lesion- (present on admission)  Assessment &  Plan  - multivessel disease - 100% RCA occlusion, 100% LCx occlusion, left main 70% stenosis, LAD 30%  - S/P KEYLA to RCA, possible reperfusion injury manifested by PEA, attained ROSC  Plan  - Cardiology following  - ASA, Plavix, statin  - to start BB, ACEi as able    Hemoptysis- (present on admission)  Assessment & Plan  - large volume hemoptysis after intubation and tenecteplase  - finding on bronchoscopy of friable left mainstem bronchus mass, sent to path  - hx of lung cancer, S/P chemo in 2005  - repeat bronchoscopy 8/4, evident old blood but no mucosal lesion, likely primary hemoptysis in setting of traumatic intubation and tenecteplase as opposed to intrabronchial lesion  Plan  - await path on mass, BAL fluid studies  - transfuse to keep Hb>7    Ventricular fibrillation (HCC)- (present on admission)  Assessment & Plan  - Vfib sustained when patient was being worked up for STEMI, likely from arrhythmogenic myocardium when hypoxic  Plan  - discontinue Amiodarone    Cardiac arrest (HCC)- (present on admission)  Assessment & Plan  - underlying ischemic heart disease, multivessel disease, initially received tenecteplase at outside facility, had Vfib, attained ROSC with 1 round of ACLS  - subsequently had PEA after cath at [facility], attained ROSC with Epi and one round of ACLS  Plan  - Amiodarone discontinued  - watch for post cath/ MI complications with continuous telemetry monitoring    Ischemic cardiomyopathy (HCC)- (present on admission)  Assessment & Plan  - EF 35% at the time of cardiac catheterization during admission  - currently on ASA, Plavix, statin, holding off on ACEi, aldosterone inhibitor and beta blocker due to continued need for pressor suppport    Metabolic acidosis- (present on admission)  Assessment & Plan  - from hypoperfusion, currently on bicarbonate gtt    History of lung cancer  Assessment & Plan  - in 2005, S/P chemo, recent hemoptysis and friable mass on bronch raising suspicion for  recurrence  - repeat bronchoscopy 8/4 with no evidence of endobronchial lesion/ mass, evident only old blood lavaged and sent for studies  - unlikely recurrence of lung cancer  Plan  - follow path on mass found during bronch 8/3, pending    COPD (chronic obstructive pulmonary disease) (HCC)- (present on admission)  Assessment & Plan  - underlying COPD, unclear if he is on supplemental O2 support  - currently intubated with CMV mode, not triggering his own breaths, SBTs being attempted  Plan  - wean off vent PRN, down on FiO2 as able, ABG PRN to assess oxygenation    Leukocytosis- (present on admission)  Assessment & Plan  - likely stress reaction, patient also hypothermic but this could be from hypoperfusion from cardiac arrest  - trending down to normal range, now normothermic  Plan  - Unasyn to cover for aspiration PNA    Encephalopathy acute- (present on admission)  Assessment & Plan  - likely toxic, metabolic, possibly hypoxic/ anoxic during Vfib/ PEA but he was alert and able to communicate this AM  - CTM, avoid prolonged hypoxia    Hyperglycemia- (present on admission)  Assessment & Plan  - BG<200, SSI      DISPO: continued ICU level of care for pressor support, hemodynamic monitoring, intubated currently    CODE STATUS: Full code    Quality Measures:  Cesar Catheter: 300 ml output since admit  DVT Prophylaxis: contraindicated, recent hemoptysis  Ulcer Prophylaxis: Pepcid  Antibiotics: Unasyn  Lines: PIV lines, RIJ CVC    This note was created using voice recognition software. There may be unintended errors in spelling, grammar or content.

## 2020-08-05 NOTE — CARE PLAN
Problem: Pain Management  Goal: Pain level will decrease to patient's comfort goal  Outcome: PROGRESSING AS EXPECTED  CPOT Q2 hours or prn   Fentanyl infusion for sedation and pain  Fentanyl pushes prn     Problem: Respiratory:  Goal: Respiratory status will improve  Outcome: PROGRESSING SLOWER THAN EXPECTED  Pt failed SBT today

## 2020-08-05 NOTE — PROGRESS NOTES
Called by Monitor Technician that pt was in SVT rate 170-180's with Systolic 's while pt on SBT. Pt ET suctioned with no response. Pt placed back on APV/CMV, Fentanyl push given - HR now 110-120's.    MD notified.

## 2020-08-05 NOTE — PROGRESS NOTES
Discussed plan of care in interdisciplinary rounds. Pt currently off of sedation, will SBT once patient is following commands. Weaning down levo as tolerated. VSS. No needs at this time. Will mobilize patient to EOB later today.

## 2020-08-05 NOTE — THERAPY
PT cardiac rehab consult received. Pt remains intubated and is not appropriate for Cardiac Rehab evaluation at this time. PT will continue to monitor status and initiate PT Cardiac Rehab Phase I evaluation as appropriate.     Mame Rehman, PT, DPT Phone: [phone]      Or     Extension: [phone]

## 2020-08-05 NOTE — CARE PLAN
Problem: Respiratory:  Goal: Respiratory status will improve  Outcome: PROGRESSING AS EXPECTED  SBT today  Weaning vent as tolerated  Extubation in next day or two   Bronchoscopy today    Problem: Bowel/Gastric:  Goal: Normal bowel function is maintained or improved  Outcome: PROGRESSING SLOWER THAN EXPECTED  Miralax scheduled   Advance TF by 10cc Q 24hours

## 2020-08-06 PROBLEM — I49.01 VENTRICULAR FIBRILLATION (HCC): Status: RESOLVED | Noted: 2020-01-01 | Resolved: 2020-01-01

## 2020-08-06 PROBLEM — R04.2 HEMOPTYSIS: Status: RESOLVED | Noted: 2020-01-01 | Resolved: 2020-01-01

## 2020-08-06 PROBLEM — I48.0 PAF (PAROXYSMAL ATRIAL FIBRILLATION) (HCC): Status: ACTIVE | Noted: 2020-01-01

## 2020-08-06 PROBLEM — I50.23 ACUTE ON CHRONIC SYSTOLIC HEART FAILURE (HCC): Status: ACTIVE | Noted: 2020-01-01

## 2020-08-06 NOTE — ASSESSMENT & PLAN NOTE
- ischemic cardiomyopathy, EF 35%, left heart cath with KEYLA to RCA for STEMI, cardiac arrest  Plan  - ASA, Plavix, statin, will start BB, ACEi, Aldactone as able, holding for current need for pressors  - Lasix discontinued for hypernatremia

## 2020-08-06 NOTE — ASSESSMENT & PLAN NOTE
- likely from increased arrhythmogenic myocardium post STEMI, cardiac arrest  - PRN Metoprolol IV for RVR  - scheduled PO Metoprolol  - CHADSVASC 3, on Eliquis

## 2020-08-06 NOTE — PROGRESS NOTES
Reason for consultation /admission : Inf STEMI    BP low last night but almost on no vasopressor  Off norepi now for half an hor or so  Had brief AF with RVR about 2:30 PM yesterday, converted back after IV metoprolol  Still intubated and being sedated  Being diuresed  No plan to wean per RN at this time    Review of systems; unable to perform      Temp:  [36.2 °C (97.1 °F)-38.1 °C (100.6 °F)] 38.1 °C (100.6 °F)  Pulse:  [] 81  Resp:  [13-21] 16  BP: ()/() 92/51  SpO2:  [98 %-100 %] 100 %  GENERAL not in acute distress, not dyspnic at rest, intubated  Head atraumatic, normocephalic  Eyes EOMI  ENT neck supple, no JVD, no carotid bruits or thyromegaly  Lung good expansion, distant sound, no rales or wheezing  Heart RRR, normal rate, no murmur,   no gallop or rub  Abd soft, no tenderness, mass or bruits  Ext 1-2+ upper ext edema  Skin no ecchymosis or petechiae  Musculoskeletal no deformity  Neuro sedated    Monitor reviewed by me showed no significant ventricular arrhythmias. Brief AF as above    Path report: no malignant cell from bronchoalveolar lavage    Labs: Cr 1, K+ 3.4  Hb 9.7 WBC 12    Assessment and plans    1. Inf STEMI D#4 s/p RCA stent  Has residual  prox LCX and around 70% eccentric calcified left main which may nolt be suitable for PCI and will probably require CABG for more optimal revascularization.   Will reassess when more stable as long as he remains stable  On vasopressor but at lower dose  No sig arrhythmias  EF 35%  On DAPT  BP still too low for betablocker     2. PAF as above no recurrence  Monitor for now  If frequently recur, will start amiodarone  No anticoag for now with hemoptysis     3. OOH VF arrest  No recurrence off IV amiodarone      4. HFrEF, prob acute on chronic due to #1  continue diuresis and conventional med Rx as BP allows    5. Anemia likely due to #2  stable  per primary    6. History of lung CA  Path report as above showed no evidence of recurrent  CA  hemoptysis resolved      Will follow    Please note that this dictation was created using voice recognition software. I have worked with consultants from the vendor as well as technical experts from [facility] [employer] to optimize the interface. I have made every reasonable attempt to correct obvious errors, but I expect that there are errors of grammar and possibly content I did not discover before finalizing the note

## 2020-08-06 NOTE — PROGRESS NOTES
"Critical Care Progress Note    Date of admission  8/2/2020    Chief Complaint  74 y.o. male admitted 8/2/2020 with cardiac arrest, STEMI, resp failure    Hospital Course    74 y.o. male with a past medical history of hypercholesterolemia, hypothyroidism, COPD, lung cancer status post chemotherapy in 2005 who presented 8/2/2020 as a transfer from [facility] where he presented for sudden onset of retrosternal crushing chest pain which was rated at that time as 8 out of 10 and radiating to the left arm.  He was found to have an inferior ST elevation myocardial infarction and received tenecteplase for treatment.  He subsequently had a reperfusion ventricular fibrillation which required defibrillation and 1 round of ACLS.  He was intubated with RSI at 1615, following this intubation hemoptysis was noted and was reported to the transport team as \"a liter and a half of serosanguinous fluid\".  He was given TXA and Kcentra, for attempted reversal of TNKase, it is unknown if the outside facility has cryoprecipitate.  He was started on amiodarone and propofol and transferred to our facility for higher level of care.  At [facility] had PCI and KEYLA to RCA with residual  LCX; EF 35%.    8/3 - brief PEA arrest ~ 1 am, received additional crystalloid and PRBCs given protamine for heparin reversal, inhaled TXA.  Hemoglobin had dropped to 7.8 and received 2U PRBCs, full ventilator support, titrating down epinephrine and norepinephrine infusions, arouses and follows commands.  WBC 28.7, suspect stress reaction but Unasyn initiated for possible pneumonia/aspiration.   8/4- some ongoing bloody secretions from ET tube; repeat bronchoscopy with old blood and airways lavaged without evidence of active bleeding, BAL sent for culture.  On/off norepinephrine infusion, off epinephrine.  Stop bicarbonate infusion today.  Not yet ready for extubation but awake and following commands.  Okay to stop amiodarone per cardiology  8/5 -SBT but not " appropriate for liberation, weaning sedation, advancing tube feed, low-dose norepinephrine, consider diuresis soon, remains on Unasyn with negative cultures to date.  Less bloody secretions in ET tube today status post bronchoscopy yesterday with no clear site for bleeding.  8/6  - path from airway mass 8/3 no malignant cells; starting stress dose steroid for ? PURI, add lasix as jayshree;         Interval Problem Update  Reviewed last 24 hour events:  Tm = 100.6  WBC 17 -> 13.5 -> 11.9  SBP 90 -100, SR  norepi gtt 1  Cortisol = 16.4  Vent day 5: 8, 40%  VBG 7.4/52/45  CXR int edema/eff unchanged   Unasyn day 4/7  ASA/Plavix  Hgb 9.7   I/O 2.5/1.1 (net + 8.7L since admit)  Replete K, phos  A/o, follows on vent  Some int agitation  Dex gtt 0.2, fent 150  jayshree TF at goal  Bowel protocol    Review of Systems  Review of Systems   Unable to perform ROS: Intubated        Vital Signs for last 24 hours   Temp:  [35.9 °C (96.7 °F)-38.1 °C (100.6 °F)] 38.1 °C (100.6 °F)  Pulse:  [] 81  Resp:  [13-21] 16  BP: ()/() 92/51  SpO2:  [98 %-100 %] 100 %    Hemodynamic parameters for last 24 hours       Respiratory Information for the last 24 hours  Vent Mode: APVCMV  Rate (breaths/min): 16  Vt Target (mL): 480  PEEP/CPAP: 8  MAP: 12  Length of Weaning Trial (Hours): 5 minutes  Control VTE (exp VT): 478    Physical Exam   Physical Exam  Vitals signs and nursing note reviewed.   Constitutional:       Appearance: He is well-developed. He is ill-appearing.      Interventions: He is intubated.   HENT:      Head: Normocephalic and atraumatic.      Right Ear: External ear normal.      Left Ear: External ear normal.      Nose: Nose normal.      Mouth/Throat:      Mouth: Mucous membranes are moist.      Comments: ET tube in position, moderate bloody secretions in the ETT/tubing  Eyes:      Conjunctiva/sclera: Conjunctivae normal.      Comments: Pupils 5 mm and nonreactive   Neck:      Musculoskeletal: Neck supple.      Vascular:  No JVD.      Trachea: No tracheal deviation.   Cardiovascular:      Rate and Rhythm: Normal rate and regular rhythm.      Pulses: Normal pulses.      Comments: Remains on vasopressors, sinus rhythm  Pulmonary:      Effort: He is intubated.      Breath sounds: Rales present. No wheezing.      Comments: Full ventilator support  Chest:      Comments: Barrel chested  Abdominal:      General: Bowel sounds are normal. There is no distension.      Palpations: Abdomen is soft.   Musculoskeletal:         General: No tenderness.   Skin:     General: Skin is dry.      Capillary Refill: Capillary refill takes 2 to 3 seconds.      Findings: No rash.      Comments: Slight clubbing; Multiple contusions to bilateral forearms   Neurological:      Comments: Arouses, follows commands moves all extremities appropriately   Psychiatric:      Comments: Unable to assess         Medications  Current Facility-Administered Medications   Medication Dose Route Frequency Provider Last Rate Last Dose   • phosphorus (K-Phos-Neutral) per tablet 250 mg  250 mg Oral Q6HRS Igor Aldana M.D.       • potassium phosphate IVPB 30 mmol in 500 mL D5W (premix)  30 mmol Intravenous Once Eduardo Gaitan M.D.        Followed by   • potassium phosphate 15 mmol in D5W 250 mL ivpb  15 mmol Intravenous Once Eduardo Gaitan M.D.       • ipratropium-albuterol (DUONEB) nebulizer solution  3 mL Nebulization Q4HRS (RT) Harsha Chavis M.D.   3 mL at 08/06/20 0626   • famotidine (PEPCID) tablet 20 mg  20 mg Enteral Tube BID Eduardo Gaitan M.D.   20 mg at 08/06/20 0521   • norepinephrine (Levophed) infusion 8 mg/250 mL (premix)  0-30 mcg/min Intravenous Continuous Poncho Sy Jr., D.O. 1.9 mL/hr at 08/06/20 0246 1 mcg/min at 08/06/20 0246   • Pharmacy Consult: Enteral tube insertion - review meds/change route/product selection  1 Each Other PHARMACY TO DOSE Eduardo Gaitan M.D.       • aspirin (ASA) chewable tab 81 mg  81 mg Enteral Tube DAILY Eduardo Gaitan M.D.    81 mg at 08/06/20 0521   • atorvastatin (LIPITOR) tablet 40 mg  40 mg Enteral Tube Q EVENING Eduardo Gaitan M.D.   40 mg at 08/05/20 1733   • acetaminophen (TYLENOL) tablet 650 mg  650 mg Enteral Tube Q6HRS PRN Eduardo Gaitan M.D.       • clopidogrel (PLAVIX) tablet 75 mg  75 mg Enteral Tube DAILY Eduardo Gaitan M.D.   75 mg at 08/06/20 0521   • ampicillin/sulbactam (UNASYN) 3 g in  mL IVPB  3 g Intravenous Q6HRS Igor Aldana M.D.   Stopped at 08/06/20 0551   • Respiratory Therapy Consult   Nebulization Continuous RT LAURA Evans Jr..O.       • ipratropium-albuterol (DUONEB) nebulizer solution  3 mL Nebulization Q2HRS PRN (RT) LAURA Evans Jr..O.   3 mL at 08/05/20 0240   • MD Alert...ICU Electrolyte Replacement per Pharmacy   Other PHARMACY TO DOSE LAURA Evans Jr..O.       • lidocaine (XYLOCAINE) 1 % injection 1-2 mL  1-2 mL Tracheal Tube Q30 MIN PRN LAURA Evans Jr..O.       • senna-docusate (PERICOLACE or SENOKOT S) 8.6-50 MG per tablet 2 Tab  2 Tab Enteral Tube BID LAURA Evans Jr..O.   2 Tab at 08/06/20 0521    And   • polyethylene glycol/lytes (MIRALAX) PACKET 1 Packet  1 Packet Enteral Tube QDAY PRN LAURA Evans Jr..O.   1 Packet at 08/04/20 1725    And   • magnesium hydroxide (MILK OF MAGNESIA) suspension 30 mL  30 mL Enteral Tube QDAY PRN LAURA Evans Jr..O.   30 mL at 08/05/20 1732    And   • bisacodyl (DULCOLAX) suppository 10 mg  10 mg Rectal QDAY PRN LAURA Evans Jr..O.       • labetalol (NORMODYNE/TRANDATE) injection 10 mg  10 mg Intravenous Q4HRS PRN LAURA Evans Jr..O.       • fentaNYL (SUBLIMAZE) injection 100 mcg  100 mcg Intravenous Q15 MIN PRN Poncho Sy Jr. D.O.   100 mcg at 08/04/20 2042    And   • fentaNYL (SUBLIMAZE) injection 200 mcg  200 mcg Intravenous Q15 MIN PRN LAURA Evans Jr..O.   200 mcg at 08/05/20 1356    And   • fentaNYL (SUBLIMAZE) 50 mcg/mL in 50mL (Continuous Infusion)   Intravenous Continuous  Poncho Sy Jr., D.O. 3 mL/hr at 08/06/20 0655 150 mcg/hr at 08/06/20 0655    And   • dexmedetomidine (PRECEDEX) 400 mcg/100mL NS premix infusion  0-1.5 mcg/kg/hr Intravenous Continuous Poncho Sy Jr., D.O. 7.9 mL/hr at 08/06/20 0000 0.5 mcg/kg/hr at 08/06/20 0000       Fluids    Intake/Output Summary (Last 24 hours) at 8/6/2020 0728  Last data filed at 8/6/2020 0600  Gross per 24 hour   Intake 2550.08 ml   Output 1105 ml   Net 1445.08 ml       Laboratory  Recent Labs     08/04/20  0347 08/05/20  0249 08/06/20  0436   ISTATAPH 7.456 7.465  --    ISTATAPCO2 37.9* 41.2*  --    ISTATAPO2 108* 60*  --    ISTATATCO2 28 31  --    RYROLRL0EZM 98 92*  --    ISTATARTHCO3 26.7* 29.6*  --    ISTATARTBE 3 5*  --    ISTATTEMP 96.0 F 36.0 C 38.1 C   ISTATFIO2 60 40 40   ISTATSPEC Arterial Arterial Venous   ISTATAPHTC 7.478 7.480  --    JMVZATXH2MP 99* 56*  --          Recent Labs     08/04/20  0240 08/04/20  1505 08/05/20  0345 08/06/20  0356   SODIUM 141 139 140 147*   POTASSIUM 3.5* 4.0 3.3* 3.4*   CHLORIDE 102 99 101 107   CO2 24 29 29 34*   BUN 24* 29* 29* 32*   CREATININE 1.14 1.19 1.00 1.09   MAGNESIUM 2.5 2.1 2.0  --    PHOSPHORUS 3.2  --  2.1* 1.4*   CALCIUM 8.3* 7.7* 7.5* 7.8*     Recent Labs     08/04/20  0240 08/04/20  1505 08/05/20  0345 08/06/20  0356   PREALBUMIN 11.8*  --   --   --    GLUCOSE 141* 125* 131* 152*     Recent Labs     08/04/20  0240 08/05/20  0345 08/06/20  0356   WBC 17.0* 13.5* 11.9*   NEUTSPOLYS 86.70* 86.20* 85.10*   LYMPHOCYTES 5.60* 5.20* 4.00*   MONOCYTES 6.50 7.30 9.10   EOSINOPHILS 0.30 0.70 1.10   BASOPHILS 0.50 0.20 0.30     Recent Labs     08/04/20  0240 08/05/20 0345 08/06/20  0356   RBC 4.13* 3.33* 3.10*   HEMOGLOBIN 12.8* 10.5* 9.7*   HEMATOCRIT 37.1* 31.4* 30.1*   PLATELETCT 217 155* 150*       Imaging  X-Ray:  I have personally reviewed the images and compared with prior images.    Assessment/Plan  * Acute ST elevation myocardial infarction (STEMI) due to occlusion of right  coronary artery (HCC)- (present on admission)  Assessment & Plan  Taken to Cath Lab on 8/2/2020, KEYLA placed in RCA  Cardiology following  DAPT  Echocardiogram this morning  Monitor for arrhythmias  Statin  beta-blocker when able    Hemoptysis- (present on admission)  Assessment & Plan  Likely due to friable endobronchial mass versus tracheal trauma during intubation  Bronchoscopy not consistent with diffuse alveolar hemorrhage  Inhaled TXA  Protamine for heparin reversal  Trend hemoglobin, transfuse to maintain hemoglobin greater than 8    Ventricular fibrillation (HCC)- (present on admission)  Assessment & Plan  At outside hospital  Continue amiodarone    Cardiac arrest (HCC)- (present on admission)  Assessment & Plan  Reportedly V. fib arrest at outside hospital, ? Reperfusion following TNKase  Continue amiodarone  Monitor for recurrent arrhythmias, optimize electrolytes    Ischemic cardiomyopathy (HCC)- (present on admission)  Assessment & Plan  Left ventricular ejection fraction of 35%  Inotrope for inotropic support  Careful volume resuscitation  Will need beta-blocker, ACE inhibitor and diuresis when able    Metabolic acidosis- (present on admission)  Assessment & Plan  Bicarbonate infusion  Continue resuscitation    History of lung cancer  Assessment & Plan  Reportedly treated in 2005  3 spiculated masses present on CT chest  Endobronchial mobile tissue mass on bronchoscopy, follow-up pathology    COPD (chronic obstructive pulmonary disease) (HCC)- (present on admission)  Assessment & Plan  Not in acute exacerbation  RT/O2 Protocols  Titrate supplemental FiO2 to maintain SpO2 >92%    Leukocytosis- (present on admission)  Assessment & Plan  Likely reactive  Monitor for signs of infection, low threshold to initiate antimicrobials    Encephalopathy acute- (present on admission)  Assessment & Plan  Limit sedatives  GCS improved to 11T, limit sedatives not a temperature management candidate    Hyperglycemia-  (present on admission)  Assessment & Plan  Goal blood glucose 120-180  sliding scale insulin, accuchecks  hypoglycemia protocol     Updated plan:  Full ventilator support, not appropriate for liberation yet  Added stress dose corticosteroids for low cortisol level/PURI  Wean down vasopressor as tolerated  Starting Lasix  Continue DAPT, follow hemoglobin  Complete course of empiric antibiotics      VTE:  Contraindicated  Ulcer: H2 Antagonist  Lines: Central Line  Ongoing indication addressed    I have performed a physical exam and reviewed and updated ROS and Plan today (8/6/2020). In review of yesterday's note (8/5/2020), there are no changes except as documented above.     Discussed patient condition and risk of morbidity and/or mortality with RN, RT, Pharmacy and [facility] resident  The patient remains critically ill.  Critical care time = 38 minutes in directly providing and coordinating critical care and extensive data review.  No time overlap and excludes procedures.

## 2020-08-06 NOTE — CARE PLAN
Problem: Ventilation Defect:  Goal: Ability to achieve and maintain unassisted ventilation or tolerate decreased levels of ventilator support  Intervention: Support and monitor invasive and noninvasive mechanical ventilation  Note: Vent Day #4    APVCMV  RR 16  Vt 480  PEEP 8  FiO2 40%

## 2020-08-06 NOTE — PROGRESS NOTES
[facility] Progress Note      Admit Date: 8/2/2020    Resident(s): Igor Aldana M.D.  Attending: JIA VAN/ Dr. Gaitan    Date & Time:   8/6/2020   2:27 PM       Patient ID:    Name:             Sukhi Howard     YOB: 1945  Age:                 74 y.o.  male   MRN:               [id number]    HPI:  74 yr old male with PMHx of DLD, hypothyroidism, COPD, lung cancer S/P chemotherapy in 2005, presented 8/2/20 as a transfer from [facility] where he presented for sudden retrosternal chest pain 8/10, radiating to the left arm, found to have an inferior STEMI, received tenecteplase, following which he had an episode of Vfib needing defibrillation and 1 round of ACLS. He was then intubated with RSI, following which high volume hemoptysis was noted necessitating TXA and Kcentra. He was started on Amiodarone gtt, Fentanyl, obtunded on admission with GCS 3. He underwent a bronchoscopy on admission significant for a right mainstem bronchus friable mass which was removed and sent to path, which was benign. GCS improved to 11T, he was taken to the cath lab and underwent PCI with KEYLA to RCA. Early on 8/3/20 AM he had PEA and Code Blue was called with him attaining ROSC after Epinephrine and 1 round of ACLS. He was also noted to be leukocytic and hypothermic to 35 C, being warmed with bear hugger. He is currently on Precedex, Levophed, Fentanyl, on tube feeding per an oral Cortrak. He is currently intubated on CMV with RR 16, PEEP 8, FiO2 40%. Bronchoscopy performed 8/4 revealed no mucosal lesions, but was positive for old blood in the airway.        Consultants:  Cardiology  PMA:     Interval Events:  - awake and alert off sedation, able to follow commands, not spontaneously triggering vent even off sedation  - SBTs unsuccessful yesterday  - episode of SVT/ST, Afib, resolved with Metoprolol  - currently sedated on Fentanyl and Precedex gtt, MAP>65 on Levophed gtt low rate  - Vent settings - CMV, RR 16, PEEP 8,  "FiO2 0.40  - TTE with EF 35%, currently on Levophed, BB, ACEi not started, on ASA, statin, Plavix  - Pepcid for GI ppx  - DVT ppx contraindicated for hemoptysis  - +8.6 L since admit, UOP 0.65 ml/kg/hr          Review of Systems   Unable to perform ROS: Critical illness       PHYSICAL EXAM  Vitals:    08/06/20 0800 08/06/20 1000 08/06/20 1020 08/06/20 1200   BP:       Pulse:   80    Resp:   16    Temp: 37.5 °C (99.5 °F) 37.4 °C (99.3 °F)  37.3 °C (99.1 °F)   TempSrc: Bladder Bladder  Bladder   SpO2:   100%    Weight:       Height:         Body mass index is 27.28 kg/m².  BP (!) 92/51   Pulse 80   Temp 37.3 °C (99.1 °F) (Bladder)   Resp 16   Ht 1.626 m (5' 4\")   Wt 72.1 kg (158 lb 15.2 oz)   SpO2 100%   BMI 27.28 kg/m²   O2 therapy: Pulse Oximetry: 100 %, O2 Delivery Device: Ventilator    Physical Exam   Constitutional: He is well-developed, well-nourished, and in no distress. No distress.   HENT:   Head: Normocephalic and atraumatic.   Right Ear: External ear normal.   Left Ear: External ear normal.   Nose: Nose normal.   Mouth/Throat: No oropharyngeal exudate.   Eyes: Conjunctivae and EOM are normal. Right eye exhibits no discharge. Left eye exhibits no discharge. No scleral icterus.   Neck: Normal range of motion. Neck supple. No JVD present.   Cardiovascular: Normal rate, regular rhythm and normal heart sounds.   No murmur heard.  Pulmonary/Chest: Effort normal and breath sounds normal. No respiratory distress. He has no wheezes.   Abdominal: Soft. Bowel sounds are normal. He exhibits no distension. There is no guarding.   Genitourinary:    Genitourinary Comments: deferred     Musculoskeletal:         General: Edema present.   Neurological:   GCS E3 V(NT) M6   Skin: Skin is warm and dry. He is not diaphoretic. No erythema.   pallor   Psychiatric:   deferred   Nursing note and vitals reviewed.      Respiratory:  Vent Mode: APVCMV  Respiration: 16, Pulse Oximetry: 100 %    Chest Tube Drains:    Recent Labs "     08/04/20  0347 08/05/20  0249 08/06/20  0436   ISTATAPH 7.456 7.465  --    ISTATAPCO2 37.9* 41.2*  --    ISTATAPO2 108* 60*  --    ISTATATCO2 28 31  --    [id number] 98 92*  --    ISTATARTHCO3 26.7* 29.6*  --    ISTATARTBE 3 5*  --    ISTATTEMP 96.0 F 36.0 C 38.1 C   ISTATFIO2 60 40 40   ISTATSPEC Arterial Arterial Venous   ISTATAPHTC 7.478 7.480  --    [id number] 99* 56*  --        HemoDynamics:  Pulse: 80 Blood Pressure : (!) 92/51     Neuro:      Fluids:  Date 08/06/20 0700 - 08/07/20 0659   Shift 7790-5820 0818-6908 0012-7858 24 Hour Total   INTAKE   I.V. 15.8   15.8     Precedex Volume 15.8   15.8   NG/   330     Intake (mL) (Enteral Tube 08/03/20 Cortrak - Gastric 10 Fr. Oral) 330   330   IV Piggyback 323.3   323.3     Volume (mL) (potassium phosphate IVPB 30 mmol in 500 mL D5W (premix)) 16.7   16.7     Volume (mL) (ampicillin/sulbactam (UNASYN) 3 g in  mL IVPB) 306.7   306.7   Enteral 60   60     Free Water / Tube Flush 60   60   Shift Total 729.1   729.1   OUTPUT   Urine 700   700     Output (mL) (Urethral Catheter Temperature probe) 700   700   Shift Total 700   700   NET 29.1   29.1        Intake/Output Summary (Last 24 hours) at 8/3/2020 1302  Last data filed at 8/3/2020 1200  Gross per 24 hour   Intake 2256.65 ml   Output 420 ml   Net 1836.65 ml       Weight: 72.1 kg (158 lb 15.2 oz)  Body mass index is 27.28 kg/m².    Recent Labs     08/04/20  0240 08/04/20  1505 08/05/20  0345 08/06/20  0356 08/06/20  0800   SODIUM 141 139 140 147*  --    POTASSIUM 3.5* 4.0 3.3* 3.4*  --    CHLORIDE 102 99 101 107  --    CO2 24 29 29 34*  --    BUN 24* 29* 29* 32*  --    CREATININE 1.14 1.19 1.00 1.09  --    MAGNESIUM 2.5 2.1 2.0  --  2.0   PHOSPHORUS 3.2  --  2.1* 1.4*  --    CALCIUM 8.3* 7.7* 7.5* 7.8*  --        GI/Nutrition:  Recent Labs     08/04/20  0240 08/04/20  1505 08/05/20  0345 08/06/20  0356   PREALBUMIN 11.8*  --   --   --    GLUCOSE 141* 125* 131* 152*       Heme:  Recent Labs      20  02420  0356   RBC 4.13* 3.33* 3.10*   HEMOGLOBIN 12.8* 10.5* 9.7*   HEMATOCRIT 37.1* 31.4* 30.1*   PLATELETCT 217 155* 150*       Infectious Disease:  Monitored Temp 2  Av.9 °C (91.2 °F)  Min: 32.9 °C (91.2 °F)  Max: 32.9 °C (91.2 °F)  Temp  Av.3 °C (99.1 °F)  Min: 36.2 °C (97.1 °F)  Max: 38.1 °C (100.6 °F)  Recent Labs     20   WBC 17.0* 13.5* 11.9*   NEUTSPOLYS 86.70* 86.20* 85.10*   LYMPHOCYTES 5.60* 5.20* 4.00*   MONOCYTES 6.50 7.30 9.10   EOSINOPHILS 0.30 0.70 1.10   BASOPHILS 0.50 0.20 0.30       Meds:  • phosphorus  250 mg     • potassium phosphate  15 mmol     • furosemide  20 mg     • hydrocortisone sodium succinate PF  50 mg     • potassium bicarbonate  50 mEq     • ipratropium-albuterol  3 mL     • famotidine  20 mg     • norepinephrine (Levophed) infusion  0-30 mcg/min 1 mcg/min (20 1016)   • Pharmacy  1 Each     • aspirin  81 mg     • atorvastatin  40 mg     • acetaminophen  650 mg     • clopidogrel  75 mg     • ampicillin-sulbactam (UNASYN) IV  3 g Stopped (20 1201)   • Respiratory Therapy Consult       • ipratropium-albuterol  3 mL     • MD Alert...Adult ICU Electrolyte Replacement per Pharmacy       • lidocaine  1-2 mL     • senna-docusate  2 Tab      And   • polyethylene glycol/lytes  1 Packet      And   • magnesium hydroxide  30 mL      And   • bisacodyl  10 mg     • labetalol  10 mg     • fentaNYL  100 mcg      And   • fentaNYL  200 mcg      And   • fentaNYL   150 mcg/hr (20 1042)    And   • dexmedetomidine (PRECEDEX) infusion  0-1.5 mcg/kg/hr 0.5 mcg/kg/hr (20 0000)        Procedures:  20 - cardiac catheterization  20 - bronchoscopy  8/3/20 - RIJ CVC insertion  8/3/20 - arterial line insertion    Imaging:  DX-CHEST-PORTABLE (1 VIEW)   Final Result         1.  Interstitial pulmonary parenchymal prominence suggest chronic underlying lung disease, component of interstitial edema  and/or infiltrates not excluded. Stable since prior study   2.  Small bilateral pleural effusions, stable since prior study   3.  Hyperexpansion of lungs favors changes of COPD.   4.  Atherosclerosis            DX-CHEST-PORTABLE (1 VIEW)   Final Result         1.  Interstitial pulmonary parenchymal prominence suggest chronic underlying lung disease, component of interstitial edema and/or infiltrates not excluded. Stable since prior study   2.  Small bilateral pleural effusions, stable since prior study   3.  Hyperexpansion of lungs favors changes of COPD.   4.  Atherosclerosis         DX-CHEST-PORTABLE (1 VIEW)   Final Result         1.  Interstitial pulmonary parenchymal prominence suggest chronic underlying lung disease, component of interstitial edema and/or infiltrates not excluded. Stable since prior study   2.  Small bilateral pleural effusions, decreased on the right since prior study   3.  Hyperexpansion of lungs favors changes of COPD.   4.  Atherosclerosis      DX-ABDOMEN FOR TUBE PLACEMENT   Final Result      Feeding tube tip at the distal stomach.      DX-CHEST-LIMITED (1 VIEW)   Final Result         1.  Interstitial pulmonary parenchymal prominence suggest chronic underlying lung disease, component of interstitial edema and/or infiltrates not excluded. Stable since prior study   2.  Left upper lobe nodular density, see dedicated CT performed August 2, 2020 for further characterization.   3.  Small bilateral pleural effusions   4.  Hyperexpansion of lungs favors changes of COPD.   5.  Atherosclerosis   6.  Right internal jugular central line is seen, there is no new central line appreciated since prior study.      DX-ABDOMEN FOR TUBE PLACEMENT   Final Result         1.  Nonspecific bowel gas pattern.   2.  Nasogastric tube tip terminates overlying the expected location of the pylorus or first duodenal segment.      DX-CHEST-PORTABLE (1 VIEW)   Final Result         1.  Interstitial pulmonary parenchymal  prominence suggest chronic underlying lung disease, component of interstitial edema and/or infiltrates not excluded.   2.  Left upper lobe nodular density, see dedicated CT performed today for further characterization.   3.  Small bilateral pleural effusions   4.  Hyperexpansion of lungs favors changes of COPD.   5.  Atherosclerosis      EC-ECHOCARDIOGRAM COMPLETE W/O CONT   Final Result      CT-CHEST (THORAX) WITH   Final Result      1.  Diffuse bullous emphysematous changes of the chest with 3 areas of spiculation, 2 in the left upper lobe, and one in the right lower lobe.      2.  Differential diagnosis includes lung carcinoma versus acute and chronic inflammatory changes associated with bullous disease.      3.  Comparison with prior imaging, if available, is recommended. Alternatively PET/CT or biopsy may be of value to exclude lung carcinoma.      4.  No thoracic adenopathy.      5.  Bibasilar areas of atelectasis or pneumonia.      6.  No significant pleural effusion.      7.  No evidence of acute hemorrhage identified.      Fleischner Society pulmonary nodule recommendations:         CT-HEAD W/O   Final Result      1.  No acute intracranial process.      2.  Atrophy and small vessel ischemic change.      CL-LEFT HEART CATHETERIZATION WITH POSSIBLE INTERVENTION    (Results Pending)       Assessment and Plan:      * Acute ST elevation myocardial infarction (STEMI) due to occlusion of right coronary artery (HCC)- (present on admission)  Assessment & Plan  - RCA STEMI< 100% occlusion on cath on admission  - S/P KEYLA to RCA, EF 35%  - currently on pressor support  Plan  - ASA, Plavix, statin per Cortrak  - holding off on BB, ACEi at this time due to ongoing need for pressor support  - Cardiology following, appreciate recs    Acute on chronic systolic heart failure (HCC)- (present on admission)  Assessment & Plan  - ischemic cardiomyopathy, EF 35%, left heart cath with KEYLA to RCA for STEMI, cardiac arrest  Plan  -  ASA, Plavix, statin, will start BB, ACEi, Aldactone as able, holding for current need for pressors  - cautious diuresis with low dose Lasix 20 mg IV BID     Coronary artery disease due to calcified coronary lesion- (present on admission)  Assessment & Plan  - multivessel disease - 100% RCA occlusion, 100% LCx occlusion, left main 70% stenosis, LAD 30%  - S/P KEYLA to RCA, possible reperfusion injury manifested by PEA, attained ROSC  Plan  - Cardiology following  - ASA, Plavix, statin  - to start BB, ACEi as able    Cardiac arrest (HCC)- (present on admission)  Assessment & Plan  - underlying ischemic heart disease, multivessel disease, initially received tenecteplase at outside facility, had Vfib, attained ROSC with 1 round of ACLS  - subsequently had PEA after cath at [facility], attained ROSC with Epi and one round of ACLS  Plan  - Amiodarone discontinued  - watch for post cath/ MI complications with continuous telemetry monitoring    PAF (paroxysmal atrial fibrillation) ([facility])  Assessment & Plan  - likely from increased arrhythmogenic myocardium post STEMI, cardiac arrest  - PRN Metoprolol for RVR  - CHADSVASC 3, candidate for anticoagulation if Afib is persistent or recurrent, contraindicated at this time due to bleeding risk    Ischemic cardiomyopathy (HCC)- (present on admission)  Assessment & Plan  - EF 35% at the time of cardiac catheterization during admission  - currently on ASA, Plavix, statin, holding off on ACEi, aldosterone inhibitor and beta blocker due to continued need for pressor suppport    Metabolic acidosis- (present on admission)  Assessment & Plan  - from hypoperfusion, was on Bicarbonate gtt, resolved, ongoing monitoring    History of lung cancer  Assessment & Plan  - in 2005, S/P chemo, recent hemoptysis and friable mass on bronch raising suspicion for recurrence  - repeat bronchoscopy 8/4 with no evidence of endobronchial lesion/ mass, evident only old blood lavaged and sent for studies  -  unlikely recurrence of lung cancer  - path from bronchoscopic tissue sample benign    COPD (chronic obstructive pulmonary disease) (HCC)- (present on admission)  Assessment & Plan  - underlying COPD, unclear if he is on supplemental O2 support  - currently intubated with CMV mode, not triggering his own breaths, SBTs being attempted  Plan  - wean off vent PRN, down on FiO2 as able, ABG PRN to assess oxygenation    Leukocytosis- (present on admission)  Assessment & Plan  - likely stress reaction, patient also hypothermic but this could be from hypoperfusion from cardiac arrest  - trending down to normal range, now normothermic  Plan  - Unasyn to cover for aspiration PNA    Encephalopathy acute- (present on admission)  Assessment & Plan  - likely toxic, metabolic, possibly hypoxic/ anoxic during Vfib/ PEA but he was alert and able to communicate this AM  - CTM, avoid prolonged hypoxia    Hyperglycemia- (present on admission)  Assessment & Plan  - BG<200, SSI      DISPO: continued ICU level of care for pressor support, hemodynamic monitoring, intubated currently    CODE STATUS: Full code    Quality Measures:  Cesar Catheter: 300 ml output since admit  DVT Prophylaxis: contraindicated, recent hemoptysis  Ulcer Prophylaxis: Pepcid  Antibiotics: Unasyn  Lines: PIV lines, RIJ CVC    This note was created using voice recognition software. There may be unintended errors in spelling, grammar or content.

## 2020-08-07 NOTE — CARE PLAN
Problem: Venous Thromboembolism (VTW)/Deep Vein Thrombosis (DVT) Prevention:  Goal: Patient will participate in Venous Thrombosis (VTE)/Deep Vein Thrombosis (DVT)Prevention Measures  Outcome: PROGRESSING AS EXPECTED  Flowsheets  Taken 8/7/2020 0800 by Sana Noland  Risk Assessment Score: 2  VTE RISK: Moderate  Mechanical Prophylaxis: SCDs, Sequential Compression Device  SCDs, Sequential Compression Device: On  Taken 8/7/2020 0800 by LEISA DonNSalma  Pharmacologic Prophylaxis Used: Contraindicated per MD     Problem: Respiratory:  Goal: Respiratory status will improve  Outcome: PROGRESSING AS EXPECTED     Problem: Bowel/Gastric:  Goal: Normal bowel function is maintained or improved  Outcome: PROGRESSING SLOWER THAN EXPECTED  Note: Bowel protocol escalated again; discussed BM PTA with distended abdomen and possible need for additional medications with Dr. Gaitan

## 2020-08-07 NOTE — PROGRESS NOTES
Reason for consultation /admission : Inf STEMI    No new cardiac issue (brief AF yesterday per RN)  Off pressor, BP borderline low overnight  Had to restart back on pressor this AM  Being diuresed  Still intubated  No bleeding on DAPT    Review of systems; intubated/sedated, unable to perform      Temp:  [36.6 °C (97.9 °F)-37.4 °C (99.3 °F)] 36.6 °C (97.9 °F)  Pulse:  [77-91] 81  Resp:  [13-30] 18  BP: ()/(46-66) 104/53  SpO2:  [93 %-100 %] 94 %  GENERAL not in acute distress, not dyspnic at rest, intubated  Head atraumatic, normocephalic  ENT neck supple, no JVD, no carotid bruits or thyromegaly  Oral mucosa and lip appear dry  Lung good expansion, distant sound, no rales or wheezing  Heart RRR, normal rate, no murmur,   no gallop or rub  Abd distended, no mass or bruits  Ext 1-2+ edema Rt forearm, 1+ Lt elbow and some edema lower abdomen and groin area but very dry in lower legs  Skin no ecchymosis or petechiae  Musculoskeletal no deformity  Neuro sedated     Monitor reviewed by me showed no significant ventricular arrhythmias.    Labs: BUN increasing up to 40 Cr 0.9, K+ 3.7 Na 147  Hb 9.7    CXR pro scar RLL, emphysema, no sig congestion    Assessment and plans    1. Inf STEMI D#5 s/p RCA stent  Has residual  prox LCX and around 70% eccentric calcified left main which may nolt be suitable for PCI and will probably require CABG for more optimal revascularization.   Will reassess when more stable as long as he remains stable  BP low normal but essentially on no vasopressor   No sig arrhythmias  EF 35%  On DAPT  BP still too low for betablocker     2. PAF occ brief recurrence  Monitor for now  If frequently recur, will start amiodarone  No anticoag for now with hemoptysis     3. HFrEF, prob acute on chronic due to #1  BP somewhat low. Labs suggest prerenal  Some of the edema is likely third spacing  Respiratory insufficiency in large part from lung issue  Would reduce diuretic dose     4. Anemia likely due  to #2  stable  per primary    5. OOH VF arrest  No recurrence off IV amiodarone      6. History of lung CA  Path report as above showed no evidence of recurrent CA  hemoptysis resolved    Will follow    Please note that this dictation was created using voice recognition software. I have worked with consultants from the vendor as well as technical experts from [facility] to optimize the interface. I have made every reasonable attempt to correct obvious errors, but I expect that there are errors of grammar and possibly content I did not discover before finalizing the note

## 2020-08-07 NOTE — HEART FAILURE PROGRAM
Cardiovascular Nurse Navigator () Advanced Heart Failure Program Inpatient Progress Note:  Inferior STEMI with HFrEF (35%). Patient remains in T606 and is on pressors, intubated.     These are not ideal circumstances to audit for GDMT so I will lis measures below to be addressed.      It looks like patient lives in [place] and carries [employer] insurance. I've given the schedulers a heads up.     Please see below for required HF measures should patient become well enough to discharge.    Daily Nurse: please begin to fill out the HF checklist (pink sheet in hard chart) and use it to guide your daily care.    Discharge Nurse: please ensure completeness of the HF checklist (pink sheet in hard chart) and have it co-signed by the charge RN before the patient leaves the hospital.     Thank you, Jaycee, Cardio RN Navigator [phone]    HF Measures:  1. Documentation of LV systolic function (echo or cath) PTA, during this hospitalization, or plan to assess post discharge or reason for not assessing documented  2. Documentation of fluid intake and urine output every nursing shift  3. 2 hour post diuretic assessment documented 2 hours after diuretic given  4. HF Patient Education using the Living Well With Heart Failure Booklet and Symptom Tracker documented every nursing shift  5. Nutrition consult for diet education  6. Daily weights (one weight documented every 24 hours) on a standing scale unless standing is contraindicated in which case bed scale can be used - have patient write weight on symptom tracker  7. For LVEF less than or equal to 40%, ACE-I, ARNI or ARB prescribed at discharge   8. For LVEF less than or equal to 40%, an Evidence Based Beta Blocker (bisoprolol, carvedilol, toprol xl) must be prescribed at discharge  9. For LVEF less than or equal to 35% aldosterone blockade prescribed at discharge  10. The combination of hydralazine and isosorbide dinitrate is recommended to reduce morbidity and mortality for  patients self-described  Americans with NYHA class III-IV HFrEF (EF 40% or less), receiving optimal therapy with ACE inhibitors and beta blockers, unless contraindicated (Class I, MICA: A).  11. If a HF patient is diabetic or is newly diagnosed with DM: prescribed diabetes treatment at discharge in the form of glycemic control (diet or anti-hyperglycemic medication) or f/u appointment for diabetes management scheduled at discharge.  12. If a HF patient has diabetes: prescribed lipid lowering medication at discharge  13. Documented smoking cessation advice or counseling  14. If a HF patient has a-fib: anticoagulation is prescribed upon discharge or contraindication is documented  15. Screening for and administering immunizations as long as no contraindications: Pneumonia (regardless of age) and Influenza  16. Written discharge instructions include:  ? Daily weights  ? Record weight on tracker  ? Bring tracker to appointments  ? Call MD for weight gain of 3lb /day or 5lb/week  ? HF medication teaching  ? Low sodium diet  ? Follow up appointment within seven calendar days of d/c must include: date, time and location  ? Activity  ? Worsening symptoms    What if any of the above HF measures are contraindicated?  ? Request that the discharging provider document the medication/intervention and the contraindication specifically in a progress note  ? For example: “no CHF meds due to hypotension” is not enough. It needs to say: “No ACE-I, ARNI, ARB due to hypotension”; “No Beta Blockade due to bradycardia”…

## 2020-08-07 NOTE — PROGRESS NOTES
"Critical Care Progress Note    Date of admission  8/2/2020    Chief Complaint  74 y.o. male admitted 8/2/2020 with cardiac arrest, STEMI, resp failure    Hospital Course    74 y.o. male with a past medical history of hypercholesterolemia, hypothyroidism, COPD, lung cancer status post chemotherapy in 2005 who presented 8/2/2020 as a transfer from [facility] where he presented for sudden onset of retrosternal crushing chest pain which was rated at that time as 8 out of 10 and radiating to the left arm.  He was found to have an inferior ST elevation myocardial infarction and received tenecteplase for treatment.  He subsequently had a reperfusion ventricular fibrillation which required defibrillation and 1 round of ACLS.  He was intubated with RSI at 1615, following this intubation hemoptysis was noted and was reported to the transport team as \"a liter and a half of serosanguinous fluid\".  He was given TXA and Kcentra, for attempted reversal of TNKase, it is unknown if the outside facility has cryoprecipitate.  He was started on amiodarone and propofol and transferred to our facility for higher level of care.  At [facility] had PCI and KEYLA to RCA with residual  LCX; EF 35%.    8/3 - brief PEA arrest ~ 1 am, received additional crystalloid and PRBCs given protamine for heparin reversal, inhaled TXA.  Hemoglobin had dropped to 7.8 and received 2U PRBCs, full ventilator support, titrating down epinephrine and norepinephrine infusions, arouses and follows commands.  WBC 28.7, suspect stress reaction but Unasyn initiated for possible pneumonia/aspiration.   8/4 - some ongoing bloody secretions from ET tube; repeat bronchoscopy with old blood and airways lavaged without evidence of active bleeding, BAL sent for culture.  On/off norepinephrine infusion, off epinephrine.  Stop bicarbonate infusion today.  Not yet ready for extubation but awake and following commands.  Okay to stop amiodarone per cardiology  8/5 - SBT but not " appropriate for liberation, weaning sedation, advancing tube feed, low-dose norepinephrine, consider diuresis soon, remains on Unasyn with negative cultures to date.  Less bloody secretions in ET tube today status post bronchoscopy yesterday with no clear site for bleeding.  8/6 - path from airway mass 8/3 no malignant cells; starting stress dose steroid for ? PURI, add lasix as jayshree;   8/7 -         Interval Problem Update  Reviewed last 24 hour events:  Tm = 99.3, WBC 15.1  Off norepi  Dex gtt  1.4/2.3 (net + 7.8L since admit)  On lasix  BUN/Cr 40/0.    Review of Systems  Review of Systems   Unable to perform ROS: Intubated        Vital Signs for last 24 hours   Temp:  [36.6 °C (97.9 °F)-37.4 °C (99.3 °F)] 36.6 °C (97.9 °F)  Pulse:  [77-91] 81  Resp:  [13-30] 18  BP: ()/(46-66) 104/53  SpO2:  [93 %-100 %] 94 %    Hemodynamic parameters for last 24 hours       Respiratory Information for the last 24 hours  Vent Mode: APVCMV  Rate (breaths/min): 16  Vt Target (mL): 480  PEEP/CPAP: 8  MAP: 13  Control VTE (exp VT): 475    Physical Exam   Physical Exam  Vitals signs and nursing note reviewed.   Constitutional:       Appearance: He is well-developed. He is ill-appearing.      Interventions: He is intubated.   HENT:      Head: Normocephalic and atraumatic.      Right Ear: External ear normal.      Left Ear: External ear normal.      Nose: Nose normal.      Mouth/Throat:      Mouth: Mucous membranes are moist.      Comments: ET tube in position, moderate bloody secretions in the ETT/tubing  Eyes:      Conjunctiva/sclera: Conjunctivae normal.      Comments: Pupils 5 mm and nonreactive   Neck:      Musculoskeletal: Neck supple.      Vascular: No JVD.      Trachea: No tracheal deviation.   Cardiovascular:      Rate and Rhythm: Normal rate and regular rhythm.      Pulses: Normal pulses.      Comments: Remains on vasopressors, sinus rhythm  Pulmonary:      Effort: He is intubated.      Breath sounds: Rales present. No  wheezing.      Comments: Full ventilator support  Chest:      Comments: Barrel chested  Abdominal:      General: Bowel sounds are normal. There is no distension.      Palpations: Abdomen is soft.   Musculoskeletal:         General: No tenderness.   Skin:     General: Skin is dry.      Capillary Refill: Capillary refill takes 2 to 3 seconds.      Findings: No rash.      Comments: Slight clubbing; Multiple contusions to bilateral forearms   Neurological:      Comments: Arouses, follows commands moves all extremities appropriately   Psychiatric:      Comments: Unable to assess         Medications  Current Facility-Administered Medications   Medication Dose Route Frequency Provider Last Rate Last Dose   • potassium phosphate 15 mmol in D5W 250 mL ivpb  15 mmol Intravenous Once Eduardo Gaitan M.D. 62.5 mL/hr at 08/07/20 0746 15 mmol at 08/07/20 0746   • furosemide (LASIX) injection 20 mg  20 mg Intravenous BID Eduardo Gaitan M.D.   20 mg at 08/07/20 0501   • hydrocortisone sodium succinate PF (SOLU-CORTEF) 100 MG injection 50 mg  50 mg Intravenous Q8HRS Eduardo Gaitan M.D.   50 mg at 08/07/20 0501   • potassium bicarbonate (KLYTE) effervescent tablet 50 mEq  50 mEq Enteral Tube BID Eduardo Gaitan M.D.   50 mEq at 08/07/20 0501   • ipratropium-albuterol (DUONEB) nebulizer solution  3 mL Nebulization Q4HRS (RT) Harsha Chavis M.D.   3 mL at 08/07/20 0623   • famotidine (PEPCID) tablet 20 mg  20 mg Enteral Tube BID Eduardo Gaitan M.D.   20 mg at 08/07/20 0501   • norepinephrine (Levophed) infusion 8 mg/250 mL (premix)  0-30 mcg/min Intravenous Continuous Poncho Sy Jr., D.O.   Stopped at 08/07/20 0420   • Pharmacy Consult: Enteral tube insertion - review meds/change route/product selection  1 Each Other PHARMACY TO DOSE Eduardo Gaitan M.D.       • aspirin (ASA) chewable tab 81 mg  81 mg Enteral Tube DAILY Eduardo Gaitan M.D.   81 mg at 08/07/20 0501   • atorvastatin (LIPITOR) tablet 40 mg  40 mg Enteral Tube Q  EVENING Eduardo Gaitan M.D.   40 mg at 08/06/20 1741   • acetaminophen (TYLENOL) tablet 650 mg  650 mg Enteral Tube Q6HRS PRN Eduardo Gaitan M.D.       • clopidogrel (PLAVIX) tablet 75 mg  75 mg Enteral Tube DAILY Eduardo Gaitan M.D.   75 mg at 08/07/20 0501   • ampicillin/sulbactam (UNASYN) 3 g in  mL IVPB  3 g Intravenous Q6HRS Igor Aldana M.D.   Stopped at 08/07/20 0543   • Respiratory Therapy Consult   Nebulization Continuous RT LAURA Evans Jr..O.       • ipratropium-albuterol (DUONEB) nebulizer solution  3 mL Nebulization Q2HRS PRN (RT) LAURA Evans Jr..O.   3 mL at 08/05/20 0240   • MD Alert...ICU Electrolyte Replacement per Pharmacy   Other PHARMACY TO DOSE LAURA Evans Jr..O.       • lidocaine (XYLOCAINE) 1 % injection 1-2 mL  1-2 mL Tracheal Tube Q30 MIN PRN LAURA Evans Jr..O.       • senna-docusate (PERICOLACE or SENOKOT S) 8.6-50 MG per tablet 2 Tab  2 Tab Enteral Tube BID LAURA Evans Jr..O.   2 Tab at 08/07/20 0501    And   • polyethylene glycol/lytes (MIRALAX) PACKET 1 Packet  1 Packet Enteral Tube QDAY PRN LAURA Evans Jr..O.   1 Packet at 08/04/20 1725    And   • magnesium hydroxide (MILK OF MAGNESIA) suspension 30 mL  30 mL Enteral Tube QDAY PRN LAURA Evans Jr..O.   30 mL at 08/05/20 1732    And   • bisacodyl (DULCOLAX) suppository 10 mg  10 mg Rectal QDAY PRN LAURA Evans Jr..O.   10 mg at 08/06/20 1735   • labetalol (NORMODYNE/TRANDATE) injection 10 mg  10 mg Intravenous Q4HRS PRN LAURA Evans Jr..O.       • fentaNYL (SUBLIMAZE) injection 100 mcg  100 mcg Intravenous Q15 MIN PRN LAURA Evans Jr..O.   100 mcg at 08/04/20 2042    And   • fentaNYL (SUBLIMAZE) injection 200 mcg  200 mcg Intravenous Q15 MIN PRN LAURA Evans Jr..OSalma   200 mcg at 08/05/20 1356    And   • fentaNYL (SUBLIMAZE) 50 mcg/mL in 50mL (Continuous Infusion)   Intravenous Continuous LAURA Evans Jr..OSalma 3 mL/hr at 08/07/20 0432 150 mcg/hr at  08/07/20 0432    And   • dexmedetomidine (PRECEDEX) 400 mcg/100mL NS premix infusion  0-1.5 mcg/kg/hr Intravenous Continuous Poncho Sy Jr., D.O. 6.4 mL/hr at 08/07/20 0600 0.4 mcg/kg/hr at 08/07/20 0600       Fluids    Intake/Output Summary (Last 24 hours) at 8/7/2020 0822  Last data filed at 8/7/2020 0600  Gross per 24 hour   Intake 1036.67 ml   Output 2215 ml   Net -1178.33 ml       Laboratory  Recent Labs     08/05/20  0249 08/06/20  0436 08/07/20  0526   ISTATAPH 7.465  --   --    ISTATAPCO2 41.2*  --   --    ISTATAPO2 60*  --   --    ISTATATCO2 31  --   --    [id number] 92*  --   --    ISTATARTHCO3 29.6*  --   --    ISTATARTBE 5*  --   --    ISTATTEMP 36.0 C 38.1 C 36.6 C   ISTATFIO2 40 40 30   ISTATSPEC Arterial Venous Venous   ISTATAPHTC 7.480  --   --    [id number] 56*  --   --          Recent Labs     08/04/20  1505 08/05/20  0345 08/06/20  0356 08/06/20  0800 08/07/20  0445   SODIUM 139 140 147*  --  147*   POTASSIUM 4.0 3.3* 3.4*  --  3.7   CHLORIDE 99 101 107  --  104   CO2 29 29 34*  --  33   BUN 29* 29* 32*  --  40*   CREATININE 1.19 1.00 1.09  --  0.91   MAGNESIUM 2.1 2.0  --  2.0  --    PHOSPHORUS  --  2.1* 1.4*  --  2.3*   CALCIUM 7.7* 7.5* 7.8*  --  7.7*     Recent Labs     08/05/20  0345 08/06/20  0356 08/07/20  0445   GLUCOSE 131* 152* 172*     Recent Labs     08/05/20  0345 08/06/20  0356 08/07/20  0445   WBC 13.5* 11.9* 15.1*   NEUTSPOLYS 86.20* 85.10* 86.30*   LYMPHOCYTES 5.20* 4.00* 2.30*   MONOCYTES 7.30 9.10 10.20   EOSINOPHILS 0.70 1.10 0.30   BASOPHILS 0.20 0.30 0.40     Recent Labs     08/05/20  0345 08/06/20  0356 08/07/20  0445   RBC 3.33* 3.10* 3.12*   HEMOGLOBIN 10.5* 9.7* 9.7*   HEMATOCRIT 31.4* 30.1* 30.0*   PLATELETCT 155* 150* 168       Imaging  X-Ray:  I have personally reviewed the images and compared with prior images.    Assessment/Plan  * Acute ST elevation myocardial infarction (STEMI) due to occlusion of right coronary artery (HCC)- (present on  admission)  Assessment & Plan  Taken to Cath Lab on 8/2/2020, KEYLA placed in RCA  Cardiology following  DAPT  Echocardiogram this morning  Monitor for arrhythmias  Statin  beta-blocker when able    Cardiac arrest (HCC)- (present on admission)  Assessment & Plan  Reportedly V. fib arrest at outside hospital, ? Reperfusion following TNKase  Continue amiodarone  Monitor for recurrent arrhythmias, optimize electrolytes    Ischemic cardiomyopathy (HCC)- (present on admission)  Assessment & Plan  Left ventricular ejection fraction of 35%  Inotrope for inotropic support  Careful volume resuscitation  Will need beta-blocker, ACE inhibitor and diuresis when able    Metabolic acidosis- (present on admission)  Assessment & Plan  Bicarbonate infusion  Continue resuscitation    History of lung cancer  Assessment & Plan  Reportedly treated in 2005  3 spiculated masses present on CT chest  Endobronchial mobile tissue mass on bronchoscopy, follow-up pathology    COPD (chronic obstructive pulmonary disease) ([facility])- (present on admission)  Assessment & Plan  Not in acute exacerbation  RT/O2 Protocols  Titrate supplemental FiO2 to maintain SpO2 >92%    Leukocytosis- (present on admission)  Assessment & Plan  Likely reactive  Monitor for signs of infection, low threshold to initiate antimicrobials    Encephalopathy acute- (present on admission)  Assessment & Plan  Limit sedatives  GCS improved to 11T, limit sedatives not a temperature management candidate    Hyperglycemia- (present on admission)  Assessment & Plan  Goal blood glucose 120-180  sliding scale insulin, accuchecks  hypoglycemia protocol     Updated plan:  SBT but not had appropriate for liberation  Agitation now limiting successful ventilator weaning, initiate nightly Risperdal  Suspect some narcotic induced ileus, no BM since admission  Trial of Relistor today  Continue Unasyn to complete 8/10  Reviewed with QA team in detail      VTE:  Contraindicated  Ulcer: H2  Antagonist  Lines: Central Line  Ongoing indication addressed    I have performed a physical exam and reviewed and updated ROS and Plan today (8/7/2020). In review of yesterday's note (8/6/2020), there are no changes except as documented above.     Discussed patient condition and risk of morbidity and/or mortality with RN, RT, Pharmacy and [employer] resident  The patient remains critically ill.  Critical care time = 34 minutes in directly providing and coordinating critical care and extensive data review.  No time overlap and excludes procedures.

## 2020-08-07 NOTE — PROGRESS NOTES
[facility] Progress Note      Admit Date: 8/2/2020    Resident(s): Igor Aldana M.D.  Attending: JIA VAN/ Dr. Gaitan    Date & Time:   8/7/2020   12:16 PM       Patient ID:    Name:             Sukhi Howard     YOB: 1945  Age:                 74 y.o.  male   MRN:               [id number]    HPI:  74 yr old male with PMHx of DLD, hypothyroidism, COPD, lung cancer S/P chemotherapy in 2005, presented 8/2/20 as a transfer from [facility] where he presented for sudden retrosternal chest pain 8/10, radiating to the left arm, found to have an inferior STEMI, received tenecteplase, following which he had an episode of Vfib needing defibrillation and 1 round of ACLS. He was then intubated with RSI, following which high volume hemoptysis was noted necessitating TXA and Kcentra. He was started on Amiodarone gtt, Fentanyl, obtunded on admission with GCS 3. He underwent a bronchoscopy on admission significant for a right mainstem bronchus friable mass which was removed and sent to path, which was benign. GCS improved to 11T, he was taken to the cath lab and underwent PCI with KEYLA to RCA. Early on 8/3/20 AM he had PEA and Code Blue was called with him attaining ROSC after Epinephrine and 1 round of ACLS. He was also noted to be leukocytic and hypothermic to 35 C, being warmed with bear hugger. He is currently on Precedex, Levophed, Fentanyl, on tube feeding per an oral Cortrak. He is currently intubated on CMV with RR 16, PEEP 8, FiO2 40%. Bronchoscopy performed 8/4 revealed no mucosal lesions, but was positive for old blood in the airway.        Consultants:  Cardiology  PMA:     Interval Events:  - On sedation and vent.restarted on pressors  - patient hasn't passed any BM yet.  - sluggish bowel sounds and abdominal distension on physical exam.  - Cardiology on board, appreciate recommendations.    Review of Systems   Unable to perform ROS: Critical illness       PHYSICAL EXAM  Vitals:    08/07/20 1045  "08/07/20 1100 08/07/20 1115 08/07/20 1130   BP: (!) 96/49 (!) 99/48 117/52 113/56   Pulse: 89 93 91 92   Resp: 16 16 16 15   Temp:       TempSrc:       SpO2: 97% 97% 93% 94%   Weight:       Height:         Body mass index is 28.95 kg/m².  /56   Pulse 92   Temp 36.9 °C (98.4 °F) (Bladder)   Resp 15   Ht 1.626 m (5' 4\")   Wt 76.5 kg (168 lb 10.4 oz)   SpO2 94%   BMI 28.95 kg/m²   O2 therapy: Pulse Oximetry: 94 %, O2 Delivery Device: Ventilator    Physical Exam   Constitutional: He is well-developed, well-nourished, and in no distress. No distress.   HENT:   Head: Normocephalic and atraumatic.   Right Ear: External ear normal.   Left Ear: External ear normal.   Nose: Nose normal.   Mouth/Throat: No oropharyngeal exudate.   Eyes: Conjunctivae and EOM are normal. Right eye exhibits no discharge. Left eye exhibits no discharge. No scleral icterus.   Neck: Normal range of motion. Neck supple. No JVD present.   Cardiovascular: Normal rate, regular rhythm and normal heart sounds.   No murmur heard.  Pulmonary/Chest: Effort normal and breath sounds normal. No respiratory distress. He has no wheezes.   Abdominal: Soft. Bowel sounds are normal. He exhibits no distension. There is no guarding.   Genitourinary:    Genitourinary Comments: deferred     Musculoskeletal:         General: Edema present.   Skin: Skin is warm and dry. He is not diaphoretic. No erythema.   pallor   Psychiatric:   deferred   Nursing note and vitals reviewed.      Respiratory:  Vent Mode: APVCMV  Respiration: 15, Pulse Oximetry: 94 %    Chest Tube Drains:    Recent Labs     08/05/20  0249 08/06/20  0436 08/07/20  0526   ISTATAPH 7.465  --   --    ISTATAPCO2 41.2*  --   --    ISTATAPO2 60*  --   --    ISTATATCO2 31  --   --    [id number] 92*  --   --    ISTATARTHCO3 29.6*  --   --    ISTATARTBE 5*  --   --    ISTATTEMP 36.0 C 38.1 C 36.6 C   ISTATFIO2 40 40 30   ISTATSPEC Arterial Venous Venous   ISTATAPHTC 7.480  --   --    [id number] 56*  " --   --        HemoDynamics:  Pulse: 92 Blood Pressure : 113/56     Neuro:      Fluids:  Date 20 07 - 2059   Shift 8016-8621 5007-6496 4004-2134 24 Hour Total   INTAKE   I.V. 315.5   315.5     Precedex Volume 268.7   268.7     Norepinephrine Volume 46.8   46.8   NG/GT 0   0     Intake (mL) (Enteral Tube 20 Cortrak - Gastric 10 Fr. Oral) 0   0   IV Piggyback 567.9   567.9     Volume (mL) (potassium phosphate 15 mmol in D5W 250 mL ivpb) 264.6   264.6     Volume (mL) (ampicillin/sulbactam (UNASYN) 3 g in  mL IVPB) 303.3   303.3   Shift Total 883.4   883.4   OUTPUT   Urine 600   600     Output (mL) (Urethral Catheter Temperature probe) 600   600   Shift Total 600   600   .4   283.4        Intake/Output Summary (Last 24 hours) at 8/3/2020 1302  Last data filed at 8/3/2020 1200  Gross per 24 hour   Intake 2256.65 ml   Output 420 ml   Net 1836.65 ml       Weight: 76.5 kg (168 lb 10.4 oz)  Body mass index is 28.95 kg/m².    Recent Labs     20  1505 20  0345 20  03520  0820  0445   SODIUM 139 140 147*  --  147*   POTASSIUM 4.0 3.3* 3.4*  --  3.7   CHLORIDE 99 101 107  --  104   CO2 29 29 34*  --  33   BUN 29* 29* 32*  --  40*   CREATININE 1.19 1.00 1.09  --  0.91   MAGNESIUM 2.1 2.0  --  2.0  --    PHOSPHORUS  --  2.1* 1.4*  --  2.3*   CALCIUM 7.7* 7.5* 7.8*  --  7.7*       GI/Nutrition:  Recent Labs     20  0345 20  0356 20  0445   GLUCOSE 131* 152* 172*       Heme:  Recent Labs     20  0345 20  0356 20  0445   RBC 3.33* 3.10* 3.12*   HEMOGLOBIN 10.5* 9.7* 9.7*   HEMATOCRIT 31.4* 30.1* 30.0*   PLATELETCT 155* 150* 168       Infectious Disease:  Monitored Temp 2  Av.7 °C (98.1 °F)  Min: 36.3 °C (97.3 °F)  Max: 36.8 °C (98.2 °F)  Temp  Av.9 °C (98.4 °F)  Min: 36.6 °C (97.9 °F)  Max: 37.4 °C (99.3 °F)  Recent Labs     20  0345 20  0356 20  0445   WBC 13.5* 11.9* 15.1*   NEUTSPOLYS 86.20* 85.10*  86.30*   LYMPHOCYTES 5.20* 4.00* 2.30*   MONOCYTES 7.30 9.10 10.20   EOSINOPHILS 0.70 1.10 0.30   BASOPHILS 0.20 0.30 0.40       Meds:  • [START ON 8/8/2020] furosemide  20 mg     • methylnaltrexone  12 mg     • hydrocortisone sodium succinate PF  50 mg     • potassium bicarbonate  50 mEq     • ipratropium-albuterol  3 mL     • famotidine  20 mg     • norepinephrine (Levophed) infusion  0-30 mcg/min Stopped (08/07/20 1150)   • Pharmacy  1 Each     • aspirin  81 mg     • atorvastatin  40 mg     • acetaminophen  650 mg     • clopidogrel  75 mg     • ampicillin-sulbactam (UNASYN) IV  3 g Stopped (08/07/20 0543)   • Respiratory Therapy Consult       • ipratropium-albuterol  3 mL     • MD Alert...Adult ICU Electrolyte Replacement per Pharmacy       • lidocaine  1-2 mL     • senna-docusate  2 Tab      And   • polyethylene glycol/lytes  1 Packet      And   • magnesium hydroxide  30 mL      And   • bisacodyl  10 mg     • labetalol  10 mg     • fentaNYL  100 mcg      And   • fentaNYL  200 mcg      And   • fentaNYL   Stopped (08/07/20 0944)    And   • dexmedetomidine (PRECEDEX) infusion  0-1.5 mcg/kg/hr 0.3 mcg/kg/hr (08/07/20 1150)        Procedures:  8/2/20 - cardiac catheterization  8/2/20 - bronchoscopy  8/3/20 - RIJ CVC insertion  8/3/20 - arterial line insertion    Imaging:  DX-CHEST-PORTABLE (1 VIEW)   Final Result         1.  Interstitial pulmonary parenchymal prominence suggest chronic underlying lung disease, component of interstitial edema and/or infiltrates not excluded. Stable since prior study   2.  Small bilateral pleural effusions, stable since prior study   3.  Hyperexpansion of lungs favors changes of COPD.   4.  Atherosclerosis               DX-CHEST-PORTABLE (1 VIEW)   Final Result         1.  Interstitial pulmonary parenchymal prominence suggest chronic underlying lung disease, component of interstitial edema and/or infiltrates not excluded. Stable since prior study   2.  Small bilateral pleural  effusions, stable since prior study   3.  Hyperexpansion of lungs favors changes of COPD.   4.  Atherosclerosis            DX-CHEST-PORTABLE (1 VIEW)   Final Result         1.  Interstitial pulmonary parenchymal prominence suggest chronic underlying lung disease, component of interstitial edema and/or infiltrates not excluded. Stable since prior study   2.  Small bilateral pleural effusions, stable since prior study   3.  Hyperexpansion of lungs favors changes of COPD.   4.  Atherosclerosis         DX-CHEST-PORTABLE (1 VIEW)   Final Result         1.  Interstitial pulmonary parenchymal prominence suggest chronic underlying lung disease, component of interstitial edema and/or infiltrates not excluded. Stable since prior study   2.  Small bilateral pleural effusions, decreased on the right since prior study   3.  Hyperexpansion of lungs favors changes of COPD.   4.  Atherosclerosis      DX-ABDOMEN FOR TUBE PLACEMENT   Final Result      Feeding tube tip at the distal stomach.      DX-CHEST-LIMITED (1 VIEW)   Final Result         1.  Interstitial pulmonary parenchymal prominence suggest chronic underlying lung disease, component of interstitial edema and/or infiltrates not excluded. Stable since prior study   2.  Left upper lobe nodular density, see dedicated CT performed August 2, 2020 for further characterization.   3.  Small bilateral pleural effusions   4.  Hyperexpansion of lungs favors changes of COPD.   5.  Atherosclerosis   6.  Right internal jugular central line is seen, there is no new central line appreciated since prior study.      DX-ABDOMEN FOR TUBE PLACEMENT   Final Result         1.  Nonspecific bowel gas pattern.   2.  Nasogastric tube tip terminates overlying the expected location of the pylorus or first duodenal segment.      DX-CHEST-PORTABLE (1 VIEW)   Final Result         1.  Interstitial pulmonary parenchymal prominence suggest chronic underlying lung disease, component of interstitial edema  and/or infiltrates not excluded.   2.  Left upper lobe nodular density, see dedicated CT performed today for further characterization.   3.  Small bilateral pleural effusions   4.  Hyperexpansion of lungs favors changes of COPD.   5.  Atherosclerosis      EC-ECHOCARDIOGRAM COMPLETE W/O CONT   Final Result      CT-CHEST (THORAX) WITH   Final Result      1.  Diffuse bullous emphysematous changes of the chest with 3 areas of spiculation, 2 in the left upper lobe, and one in the right lower lobe.      2.  Differential diagnosis includes lung carcinoma versus acute and chronic inflammatory changes associated with bullous disease.      3.  Comparison with prior imaging, if available, is recommended. Alternatively PET/CT or biopsy may be of value to exclude lung carcinoma.      4.  No thoracic adenopathy.      5.  Bibasilar areas of atelectasis or pneumonia.      6.  No significant pleural effusion.      7.  No evidence of acute hemorrhage identified.      Fleischner Society pulmonary nodule recommendations:         CT-HEAD W/O   Final Result      1.  No acute intracranial process.      2.  Atrophy and small vessel ischemic change.      CL-LEFT HEART CATHETERIZATION WITH POSSIBLE INTERVENTION    (Results Pending)       Assessment and Plan:      * Acute ST elevation myocardial infarction (STEMI) due to occlusion of right coronary artery (HCC)- (present on admission)  Assessment & Plan  - RCA STEMI< 100% occlusion on cath on admission  - S/P KEYLA to RCA, EF 35%  - currently on pressor support  Plan  - ASA, Plavix, statin per Cortrak  - holding off on BB, ACEi at this time due to ongoing need for pressor support  - Cardiology following, appreciate recs    Acute on chronic systolic heart failure (HCC)- (present on admission)  Assessment & Plan  - ischemic cardiomyopathy, EF 35%, left heart cath with KEYLA to RCA for STEMI, cardiac arrest  Plan  - ASA, Plavix, statin, will start BB, ACEi, Aldactone as able, holding for current  need for pressors  - cautious diuresis with low dose Lasix 20 mg IV BID     Coronary artery disease due to calcified coronary lesion- (present on admission)  Assessment & Plan  - multivessel disease - 100% RCA occlusion, 100% LCx occlusion, left main 70% stenosis, LAD 30%  - S/P KEYLA to RCA, possible reperfusion injury manifested by PEA, attained ROSC  Plan  - Cardiology following  - ASA, Plavix, statin  - to start BB, ACEi as able    Cardiac arrest (HCC)- (present on admission)  Assessment & Plan  - underlying ischemic heart disease, multivessel disease, initially received tenecteplase at outside facility, had Vfib, attained ROSC with 1 round of ACLS  - subsequently had PEA after cath at [facility], attained ROSC with Epi and one round of ACLS  Plan  - Amiodarone discontinued  - watch for post cath/ MI complications with continuous telemetry monitoring    PAF (paroxysmal atrial fibrillation) ([facility])  Assessment & Plan  - likely from increased arrhythmogenic myocardium post STEMI, cardiac arrest  - PRN Metoprolol for RVR  - CHADSVASC 3, candidate for anticoagulation if Afib is persistent or recurrent, contraindicated at this time due to bleeding risk    Ischemic cardiomyopathy (HCC)- (present on admission)  Assessment & Plan  - EF 35% at the time of cardiac catheterization during admission  - currently on ASA, Plavix, statin, holding off on ACEi, aldosterone inhibitor and beta blocker due to continued need for pressor suppport    Metabolic acidosis- (present on admission)  Assessment & Plan  - from hypoperfusion, was on Bicarbonate gtt, resolved, ongoing monitoring    History of lung cancer  Assessment & Plan  - in 2005, S/P chemo, recent hemoptysis and friable mass on bronch raising suspicion for recurrence  - repeat bronchoscopy 8/4 with no evidence of endobronchial lesion/ mass, evident only old blood lavaged and sent for studies  - unlikely recurrence of lung cancer  - path from bronchoscopic tissue sample  benign    COPD (chronic obstructive pulmonary disease) (HCC)- (present on admission)  Assessment & Plan  - underlying COPD, unclear if he is on supplemental O2 support  - currently intubated with CMV mode, not triggering his own breaths, SBTs being attempted  Plan  - wean off vent PRN, down on FiO2 as able, ABG PRN to assess oxygenation    Leukocytosis- (present on admission)  Assessment & Plan  - likely stress reaction, patient also hypothermic but this could be from hypoperfusion from cardiac arrest  - trending down to normal range, now normothermic  Plan  - Unasyn to cover for aspiration PNA    Encephalopathy acute- (present on admission)  Assessment & Plan  - likely toxic, metabolic, possibly hypoxic/ anoxic during Vfib/ PEA but he was alert and able to communicate this AM  - CTM, avoid prolonged hypoxia    Hyperglycemia- (present on admission)  Assessment & Plan  - BG<200, SSI      DISPO: continued ICU level of care for pressor support, hemodynamic monitoring, intubated currently    CODE STATUS: Full code    Quality Measures:  Cesar Catheter: yes  DVT Prophylaxis: contraindicated, recent hemoptysis  Ulcer Prophylaxis: Pepcid  Antibiotics: Unasyn  Lines: PIV lines, RIJ CVC.

## 2020-08-08 NOTE — PROGRESS NOTES
"Critical Care Progress Note    Date of admission  8/2/2020    Chief Complaint  74 y.o. male admitted 8/2/2020 with cardiac arrest, STEMI, resp failure    Hospital Course    74 y.o. male with a past medical history of hypercholesterolemia, hypothyroidism, COPD, lung cancer status post chemotherapy in 2005 who presented 8/2/2020 as a transfer from [facility] where he presented for sudden onset of retrosternal crushing chest pain which was rated at that time as 8 out of 10 and radiating to the left arm.  He was found to have an inferior ST elevation myocardial infarction and received tenecteplase for treatment.  He subsequently had a reperfusion ventricular fibrillation which required defibrillation and 1 round of ACLS.  He was intubated with RSI at 1615, following this intubation hemoptysis was noted and was reported to the transport team as \"a liter and a half of serosanguinous fluid\".  He was given TXA and Kcentra, for attempted reversal of TNKase, it is unknown if the outside facility has cryoprecipitate.  He was started on amiodarone and propofol and transferred to our facility for higher level of care.  At [facility] had PCI and KEYLA to RCA with residual  LCX; EF 35%.    8/3 - brief PEA arrest ~ 1 am, received additional crystalloid and PRBCs given protamine for heparin reversal, inhaled TXA.  Hemoglobin had dropped to 7.8 and received 2U PRBCs, full ventilator support, titrating down epinephrine and norepinephrine infusions, arouses and follows commands.  WBC 28.7, suspect stress reaction but Unasyn initiated for possible pneumonia/aspiration.   8/4 - some ongoing bloody secretions from ET tube; repeat bronchoscopy with old blood and airways lavaged without evidence of active bleeding, BAL sent for culture.  On/off norepinephrine infusion, off epinephrine.  Stop bicarbonate infusion today.  Not yet ready for extubation but awake and following commands.  Okay to stop amiodarone per cardiology  8/5 - SBT but not " appropriate for liberation, weaning sedation, advancing tube feed, low-dose norepinephrine, consider diuresis soon, remains on Unasyn with negative cultures to date.  Less bloody secretions in ET tube today status post bronchoscopy yesterday with no clear site for bleeding.  8/6 - path from airway mass 8/3 no malignant cells; starting stress dose steroid for ? PURI, add lasix as jayshree;   8/7 - full ventilator support, Lasix, not ready for extubation; no bowel movement, given Relistor today  8/8 -extubated to BiPAP and tolerating well, sodium high 152, Lasix stopped, Risperdal added for anxiety/agitation        Interval Problem Update  Reviewed last 24 hour events:  CXR today reviewed - bilat eff's, mild edema, copd, ETT OK  Na 152  I/O = 1.4/2.2 (-800, net + 7L)  Lasix 20 qd - stop today   solucortef  Dex gtt  Tm 99.3  Unasyn 8/3 - P  ECG - SR, QTc 460  A/O, follows, low dex gtt  SR/ST,  - 140, off norepi  Sm BM yesterday  Vent day 7: VBG  SBT 1 hour, NIF -30,   Resume ppx lovenox  Add symbicort      Review of Systems  Review of Systems   Unable to perform ROS: Intubated        Vital Signs for last 24 hours   Temp:  [36.9 °C (98.4 °F)-37.1 °C (98.8 °F)] 37.1 °C (98.8 °F)  Pulse:  [] 83  Resp:  [8-31] 15  BP: ()/(44-97) 132/65  SpO2:  [82 %-100 %] 100 %    Hemodynamic parameters for last 24 hours       Respiratory Information for the last 24 hours  Vent Mode: APVCMV  Rate (breaths/min): 16  Vt Target (mL): 480  PEEP/CPAP: 8  P Support: 5  MAP: 12  Length of Weaning Trial (Hours): 1  Control VTE (exp VT): 564    Physical Exam   Physical Exam  Vitals signs and nursing note reviewed.   Constitutional:       Appearance: He is well-developed. He is ill-appearing.      Interventions: He is intubated.   HENT:      Head: Normocephalic and atraumatic.      Right Ear: External ear normal.      Left Ear: External ear normal.      Nose: Nose normal.      Mouth/Throat:      Mouth: Mucous membranes are moist.       Comments: ET tube in position, moderate bloody secretions in the ETT/tubing  Eyes:      Conjunctiva/sclera: Conjunctivae normal.      Comments: Pupils 5 mm and nonreactive   Neck:      Musculoskeletal: Neck supple.      Vascular: No JVD.      Trachea: No tracheal deviation.   Cardiovascular:      Rate and Rhythm: Normal rate and regular rhythm.      Pulses: Normal pulses.      Comments: Remains on vasopressors, sinus rhythm  Pulmonary:      Effort: He is intubated.      Breath sounds: Rales present. No wheezing.      Comments: Full ventilator support  Chest:      Comments: Barrel chested  Abdominal:      General: Bowel sounds are normal. There is no distension.      Palpations: Abdomen is soft.   Musculoskeletal:         General: No tenderness.   Skin:     General: Skin is dry.      Capillary Refill: Capillary refill takes 2 to 3 seconds.      Findings: No rash.      Comments: Slight clubbing; Multiple contusions to bilateral forearms   Neurological:      Comments: Arouses, follows commands moves all extremities appropriately   Psychiatric:      Comments: Unable to assess         Medications  Current Facility-Administered Medications   Medication Dose Route Frequency Provider Last Rate Last Dose   • furosemide (LASIX) injection 20 mg  20 mg Intravenous Q DAY Kristyn Jon M.D.   20 mg at 08/08/20 0507   • hydrocortisone sodium succinate PF (SOLU-CORTEF) 100 MG injection 50 mg  50 mg Intravenous Q8HRS Eduardo Gaitan M.D.   50 mg at 08/08/20 0506   • potassium bicarbonate (KLYTE) effervescent tablet 50 mEq  50 mEq Enteral Tube BID Eduardo Gaitan M.D.   Stopped at 08/08/20 0506   • ipratropium-albuterol (DUONEB) nebulizer solution  3 mL Nebulization Q4HRS (RT) Harsha Chavis M.D.   3 mL at 08/08/20 0611   • famotidine (PEPCID) tablet 20 mg  20 mg Enteral Tube BID Eduardo Gaitan M.D.   20 mg at 08/08/20 0506   • norepinephrine (Levophed) infusion 8 mg/250 mL (premix)  0-30 mcg/min Intravenous Continuous  LAURA Evans Jr..OSalma   Stopped at 08/07/20 1745   • Pharmacy Consult: Enteral tube insertion - review meds/change route/product selection  1 Each Other PHARMACY TO DOSE Eduardo Gaitan M.D.       • aspirin (ASA) chewable tab 81 mg  81 mg Enteral Tube DAILY Eduardo Gaitan M.D.   81 mg at 08/08/20 0506   • atorvastatin (LIPITOR) tablet 40 mg  40 mg Enteral Tube Q EVENING Eduardo Gaitan M.D.   40 mg at 08/07/20 1748   • acetaminophen (TYLENOL) tablet 650 mg  650 mg Enteral Tube Q6HRS PRN Eduardo Gaitan M.D.       • clopidogrel (PLAVIX) tablet 75 mg  75 mg Enteral Tube DAILY Eduardo Gaitan M.D.   75 mg at 08/08/20 0506   • ampicillin/sulbactam (UNASYN) 3 g in  mL IVPB  3 g Intravenous Q6HRS Igor Aldana M.D.   Stopped at 08/08/20 0536   • Respiratory Therapy Consult   Nebulization Continuous RT LAURA Evans Jr..O.       • ipratropium-albuterol (DUONEB) nebulizer solution  3 mL Nebulization Q2HRS PRN (RT) LAURA Evans Jr..OSalma   3 mL at 08/05/20 0240   • MD Alert...ICU Electrolyte Replacement per Pharmacy   Other PHARMACY TO DOSE LAURA Evans Jr..O.       • lidocaine (XYLOCAINE) 1 % injection 1-2 mL  1-2 mL Tracheal Tube Q30 MIN PRN LAURA Evans Jr..O.       • senna-docusate (PERICOLACE or SENOKOT S) 8.6-50 MG per tablet 2 Tab  2 Tab Enteral Tube BID LAURA Evans Jr..O.   2 Tab at 08/08/20 0506    And   • polyethylene glycol/lytes (MIRALAX) PACKET 1 Packet  1 Packet Enteral Tube QDAY PRN LAURA Evans Jr..O.   1 Packet at 08/07/20 1015    And   • magnesium hydroxide (MILK OF MAGNESIA) suspension 30 mL  30 mL Enteral Tube QDAY PRN LAURA Evans Jr..O.   30 mL at 08/05/20 1732    And   • bisacodyl (DULCOLAX) suppository 10 mg  10 mg Rectal QDAY PRN Poncho Sy Jr., D.O.   10 mg at 08/06/20 1735   • labetalol (NORMODYNE/TRANDATE) injection 10 mg  10 mg Intravenous Q4HRS PRN Poncho Sy Jr., D.O.       • fentaNYL (SUBLIMAZE) injection 100 mcg  100 mcg  Intravenous Q15 MIN PRN Poncho Sy Jr. D.O.   100 mcg at 08/08/20 0303    And   • fentaNYL (SUBLIMAZE) injection 200 mcg  200 mcg Intravenous Q15 MIN PRN LAURA Evans Jr..OSalma   200 mcg at 08/05/20 1356    And   • fentaNYL (SUBLIMAZE) 50 mcg/mL in 50mL (Continuous Infusion)   Intravenous Continuous LAURA Evans Jr..O.   Stopped at 08/07/20 0944    And   • dexmedetomidine (PRECEDEX) 400 mcg/100mL NS premix infusion  0-1.5 mcg/kg/hr Intravenous Continuous LAURA Evans Jr..O. 3.2 mL/hr at 08/08/20 0450 0.2 mcg/kg/hr at 08/08/20 0450       Fluids    Intake/Output Summary (Last 24 hours) at 8/8/2020 0814  Last data filed at 8/8/2020 0600  Gross per 24 hour   Intake 1407.63 ml   Output 2210 ml   Net -802.37 ml       Laboratory  Recent Labs     08/06/20  0436 08/07/20  0526   ISTATTEMP 38.1 C 36.6 C   ISTATFIO2 40 30   ISTATSPEC Venous Venous         Recent Labs     08/06/20  0356 08/06/20  0800 08/07/20  0445 08/08/20  0515   SODIUM 147*  --  147* 152*   POTASSIUM 3.4*  --  3.7 3.7   CHLORIDE 107  --  104 106   CO2 34*  --  33 33   BUN 32*  --  40* 38*   CREATININE 1.09  --  0.91 1.02   MAGNESIUM  --  2.0  --   --    PHOSPHORUS 1.4*  --  2.3* 6.6*   CALCIUM 7.8*  --  7.7* 7.7*     Recent Labs     08/06/20  0356 08/07/20  0445 08/08/20  0515   GLUCOSE 152* 172* 126*     Recent Labs     08/06/20  0356 08/07/20  0445 08/08/20  0515   WBC 11.9* 15.1* 15.0*   NEUTSPOLYS 85.10* 86.30* 83.00*   LYMPHOCYTES 4.00* 2.30* 2.90*   MONOCYTES 9.10 10.20 12.50   EOSINOPHILS 1.10 0.30 0.60   BASOPHILS 0.30 0.40 0.30     Recent Labs     08/06/20  0356 08/07/20  0445 08/08/20  0515   RBC 3.10* 3.12* 3.38*   HEMOGLOBIN 9.7* 9.7* 10.5*   HEMATOCRIT 30.1* 30.0* 32.3*   PLATELETCT 150* 168 237       Imaging  X-Ray:  I have personally reviewed the images and compared with prior images.    Assessment/Plan  * Acute ST elevation myocardial infarction (STEMI) due to occlusion of right coronary artery (HCC)- (present on  admission)  Assessment & Plan  Taken to Cath Lab on 8/2/2020, KEYLA placed in RCA  Cardiology following  DAPT  Echocardiogram this morning  Monitor for arrhythmias  Statin  beta-blocker when able    Cardiac arrest (HCC)- (present on admission)  Assessment & Plan  Reportedly V. fib arrest at outside hospital, ? Reperfusion following TNKase  Continue amiodarone  Monitor for recurrent arrhythmias, optimize electrolytes    COPD (chronic obstructive pulmonary disease) (HCC)- (present on admission)  Assessment & Plan  Not in acute exacerbation  RT/O2 Protocols  Titrate supplemental FiO2 to maintain SpO2 >92%    Leukocytosis- (present on admission)  Assessment & Plan  Likely reactive  Monitor for signs of infection, low threshold to initiate antimicrobials    Ischemic cardiomyopathy (HCC)- (present on admission)  Assessment & Plan  Left ventricular ejection fraction of 35%  Inotrope for inotropic support  Careful volume resuscitation  Will need beta-blocker, ACE inhibitor and diuresis when able    History of lung cancer  Assessment & Plan  Reportedly treated in 2005  3 spiculated masses present on CT chest  Endobronchial mobile tissue mass on bronchoscopy, follow-up pathology    Encephalopathy acute- (present on admission)  Assessment & Plan  Limit sedatives  GCS improved to 11T, limit sedatives not a temperature management candidate    Metabolic acidosis- (present on admission)  Assessment & Plan  Bicarbonate infusion  Continue resuscitation    Hyperglycemia- (present on admission)  Assessment & Plan  Goal blood glucose 120-180  sliding scale insulin, accuchecks  hypoglycemia protocol     Updated plan:  SBT and liberate today as tolerated  BiPAP after extubation with severe underlying emphysema  Small BM, given Relistor yesterday, increase mobility and bowel protocol  Speech-language evaluation for swallowing after extubation  Complete course of Unasyn  PT/OT      VTE:  Contraindicated  Ulcer: H2 Antagonist  Lines: Central  Line  Ongoing indication addressed    I have performed a physical exam and reviewed and updated ROS and Plan today (8/8/2020). In review of yesterday's note (8/7/2020), there are no changes except as documented above.     Discussed patient condition and risk of morbidity and/or mortality with RN, RT, Pharmacy and [facility] resident  The patient remains critically ill.  Critical care time = 36 minutes in directly providing and coordinating critical care and extensive data review.  No time overlap and excludes procedures.

## 2020-08-08 NOTE — PROGRESS NOTES
2 RN skin check performed with Onel, DTI's noted to bilateral heels, non-blanchable redness to lateral aspect of right foot, weeping edema to BUE, skin tear to right forearm, generalized bruising.

## 2020-08-08 NOTE — PROGRESS NOTES
Monitor Summary   .22/.10/.36    SR80s- 90  In and out of AF 110s- 120's.   Frequent PAC's/PVCs.   12 hr chart check

## 2020-08-08 NOTE — CARE PLAN
Adult Noninvasive Ventilation    BiPap Day 2  IPAP (cmH2O): 14 (08/08/20 1412)  EPAP (cm H2O): 8 (08/08/20 1412)  FiO2: 40 (08/08/20 1412)            Events/Summary/Plan: Placed pt on bipap 14/8 per Dr. Gaitan (08/08/20 1056)    Duo Q4  Symbicort BID

## 2020-08-08 NOTE — RESPIRATORY CARE
Extubation    Cuff leak noted yes  Stridor present no     FiO2%: 30 % (08/08/20 0900)  O2 (LPM): 6 (08/08/20 1019)     Patient had some wheezing present Duoneb given before extubation. MD aware  Events/Summary/Plan: Pt extubated to 6L oxymask some wheezing present  (08/08/20 1019)

## 2020-08-08 NOTE — PROGRESS NOTES
Reason for consultation /admission : Inf MI/OOH VF arrest    Remains intubated but intensivist plan to extubate later today.  Awake and denies chest pain or SOB  BP borderline off pressors  In and out of atrial fibrillation yesterday up to 25-30 minutes at time  No hemoptysis or other signs of bleeding    Review of systems; unable to perform due to intubation      Temp:  [36.9 °C (98.4 °F)-37.1 °C (98.8 °F)] 37.1 °C (98.8 °F)  Pulse:  [] 83  Resp:  [8-31] 15  BP: ()/(44-97) 132/65  SpO2:  [82 %-100 %] 100 %  GENERAL not in acute distress, not dyspnic at rest, thin  Head atraumatic, normocephalic  Eyes EOMI  ENT , dry oral mucosa, neck supple, no JVD, no carotid bruits or thyromegaly  Lung good expansion, distant sound, no rales or wheezing  Heart RRR, normal rate, no murmur,   no gallop or rub  Abd soft, no tenderness, mass or bruits  Ext 1+ edema right forearm, both feet and lower extremity appear rather dry  Skin +ecchymosis or petechiae  Musculoskeletal no deformity  Neuro grossly intact    Monitor reviewed by me showed intermittent AF but significant ventricular arrhythmias.    Labs: Cr 1, K+ 3.7  Hb 10    Assessment and plans    1. Inf STEMI D#6 s/p RCA [place] drug eluting stent  Has residual  prox LCX and around 70% eccentric calcified left main which may nolt be suitable for PCI and will probably require CABG for more optimal revascularization.   No sig ventricular arrhythmias  EF 35%  On DAPT  Now with A. fib will be on triple therapy.  Will discontinue aspirin after 2 weeks total or so.  With [place] drug-eluting stent, also only required dual antiplatelet therapy for 1 month  BP probably still too low for betablocker or ACE inhibitor  Continue statin     2. PAF  fairly frequent recurrence  Will start amiodarone and apixaban     3. HFrEF, prob acute on chronic due to #1  BP borderline somewhat low could be partly from sedation and low intra-vascular volume.   Labs slightly improved with low-dose  diuretics   We will initiate beta-blocker and or low-dose ACE inhibitor once blood pressure improved     4. Anemia probably acute on chronic  No further hemoptysis  stable.  Will need closely follow with initiation of apixaban (third triple therapy)     5. OOH VF arrest due to acute MI as expected, no recurrence      6. History of lung CA  Path report as above showed no evidence of recurrent CA  hemoptysis resolved    Will follow    Please note that this dictation was created using voice recognition software. I have worked with consultants from the vendor as well as technical experts from [employer] to optimize the interface. I have made every reasonable attempt to correct obvious errors, but I expect that there are errors of grammar and possibly content I did not discover before finalizing the note

## 2020-08-08 NOTE — PROGRESS NOTES
[facility] Progress Note      Admit Date: 2020  Resident(s): Roberto Carlos Hawkins M.D.   Attending: JIA Nolasco/ LINO Tirado    Date & Time:   2020   7:31 AM       Patient ID:    Name:             Sukhi Howard     YOB: 1945  Age:                 74 y.o.  male   MRN:               [id number]      ID:  A 74-year-old male with past medical history of hypothyroidism, DLD, COPD, lung cancer status post chemotherapy in  transferred from [facility] on  for higher level of care.    He presented to [facility] with history of chest pain radiating to left arm found out to have inferior STEMI received tenecteplase following which he had an episode of V. fib needing defibrillation and 1 round of ACLS.  He was then intubated with RSI following which high-volume hemoptysis was noted necessitating TXA and Kcentra.  He was started on amiodarone drip, fentanyl, obtunded on admission with GCS 3. he underwent bronchoscopy on admission significant for right main bronchus friable mass which was removed and sent for pathology which was benign.  GCS improved to 11 he was taken to Cath Lab and underwent PCI with KEYLA to RCA.  Early 8/3 he had PEA and CODE BLUE was called with him attaining ROSC after epinephrine and 1 round of ACLS.  He was also noted to be leukocytic and hypothermic to 35 °C being warmed with bear hugger.  He is currently intubated with vent settings 16/480/8/30 percent.  He is on tube feeding.    Interval Events:  Patient is alert and intubated following the commands moving all extremities not agitated, patient had a small bowel movement this morning.  Still sluggish bowel sounds with slight abdominal distention  I/O: 1407/2210 = -802  Wt= 76.5Kg from 71.5 on admission.    Review of Systems   All other systems reviewed and are negative.    Unable to perform ROS: Critical illness    VITALS:  Pulse: 83  Blood Pressure : 132/65      Monitored Temp 2  Av °C (98.6 °F)  Min:  36.3 °C (97.3 °F)  Max: 37.4 °C (99.3 °F)  Temp  Av.9 °C (98.5 °F)  Min: 36.8 °C (98.2 °F)  Max: 37.1 °C (98.8 °F)    Vent Mode: APVCMV, Rate (breaths/min): 16, Vt Target (mL): 480, PEEP/CPAP: 8, P Support: 5, Static Compliance (ml / cm H2O): 60    Physical Exam:  Physical Exam  GEN: Vented, Sedated.  But patient is alert, following commands, moving all extremities  HEENT: NC/AT. ET tube securely in place. NGT in place.  Cardiovascular: S1-S2 normal no heart murmurs  Pulmonary: Effort normal breath sounds good no wheezing no crackles  Abdominal: Soft, bowel sounds sluggish, slight distention nontender  Genitourinary: On Cesar's  Musculoskeletal: Moving all the extremities  Skin warm and dry  Neurological: Intubated but alert and following commands    Consultants:  Cardiology  PMA:     HemoDynamics:  Pulse: 83 Blood Pressure : 132/65       Respiratory:  Vent Mode: APVCMV  Respiration: 14, Pulse Oximetry: 100 %    Chest Tube Drains:    Recent Labs     20  0436 20  0526   ISTATTEMP 38.1 C 36.6 C   ISTATFIO2 40 30   ISTATSPEC Venous Venous       Fluids:  Intake/Output       20 - 2059 20 - 20 0659 20 - 20 0659       Total 1900-0659 Total  Total       Intake    I.V.  15.8  -- 15.8  359.8  -- 359.8  --  -- --    Precedex Volume 15.8 -- 15.8 291.5 -- 291.5 -- -- --    Norepinephrine Volume -- -- -- 68.3 -- 68.3 -- -- --    NG/GT  660  330 990  0  -- 0  --  -- --    Intake (mL) (Enteral Tube 20 Cortrak - Gastric 10 Fr. Oral) 660 330 990 0 -- 0 -- -- --    IV Piggyback  323.3  -- 323.3  1017.8  -- 1017.8  --  -- --    Volume (mL) (potassium phosphate IVPB 30 mmol in 500 mL D5W (premix)) 16.7 -- 16.7 -- -- -- -- -- --    Volume (mL) (potassium phosphate 15 mmol in D5W 250 mL ivpb) -- -- -- 249.9 -- 249.9 -- -- --    Volume (mL) (potassium phosphate 15 mmol in D5W 250 mL ivpb) -- -- -- 264.6 -- 264.6 --  -- --    Volume (mL) (ampicillin/sulbactam (UNASYN) 3 g in  mL IVPB) 306.7 -- 306.7 503.3 -- 503.3 -- -- --    Enteral    --   30  -- 30  --  -- --    Free Water / Tube Flush  30 -- 30 -- -- --    Total Intake 1089.1 330 1419.1 1407.6 -- 1407.6 -- -- --       Output    Urine  1090  1200 2290  810  1400 2210  --  -- --    Output (mL) (Urethral Catheter Temperature probe) 1090 1200 2290 810 1400 2210 -- -- --    Stool  --  -- --  --  -- --  --  -- --    Number of Times Stooled -- -- -- -- 1 x 1 x -- -- --    Total Output 1090 1200 2290 810 1400 2210 -- -- --       Net I/O     -0.9 -870 -870.9 597.6 -1400 -802.4 -- -- --           Recent Labs     20  082015   SODIUM 147*  --  147* 152*   POTASSIUM 3.4*  --  3.7 3.7   CHLORIDE 107  --  104 106   CO2 34*  --  33 33   BUN 32*  --  40* 38*   CREATININE 1.09  --  0.91 1.02   MAGNESIUM  --  2.0  --   --    PHOSPHORUS 1.4*  --  2.3* 6.6*   CALCIUM 7.8*  --  7.7* 7.7*     Body mass index is 28.95 kg/m².    GI/Nutrition:  Recent Labs     20   GLUCOSE 152* 172* 126*       Heme:  Recent Labs     20  0515   RBC 3.10* 3.12* 3.38*   HEMOGLOBIN 9.7* 9.7* 10.5*   HEMATOCRIT 30.1* 30.0* 32.3*   PLATELETCT 150* 168 237       Infectious Disease:  Monitored Temp 2  Av °C (98.6 °F)  Min: 36.3 °C (97.3 °F)  Max: 37.4 °C (99.3 °F)  Temp  Av.9 °C (98.5 °F)  Min: 36.8 °C (98.2 °F)  Max: 37.1 °C (98.8 °F)  Recent Labs     20  0356 20  0445 20  0515   WBC 11.9* 15.1* 15.0*   NEUTSPOLYS 85.10* 86.30* 83.00*   LYMPHOCYTES 4.00* 2.30* 2.90*   MONOCYTES 9.10 10.20 12.50   EOSINOPHILS 1.10 0.30 0.60   BASOPHILS 0.30 0.40 0.30       Medications:  • potassium bicarbonate  25 mEq     • furosemide  20 mg     • hydrocortisone sodium succinate PF  50 mg     • potassium bicarbonate  50 mEq     • ipratropium-albuterol  3 mL     •  famotidine  20 mg     • norepinephrine (Levophed) infusion  0-30 mcg/min Stopped (08/07/20 4834)   • Pharmacy  1 Each     • aspirin  81 mg     • atorvastatin  40 mg     • acetaminophen  650 mg     • clopidogrel  75 mg     • ampicillin-sulbactam (UNASYN) IV  3 g Stopped (08/08/20 0536)   • Respiratory Therapy Consult       • ipratropium-albuterol  3 mL     • MD Alert...Adult ICU Electrolyte Replacement per Pharmacy       • lidocaine  1-2 mL     • senna-docusate  2 Tab      And   • polyethylene glycol/lytes  1 Packet      And   • magnesium hydroxide  30 mL      And   • bisacodyl  10 mg     • labetalol  10 mg     • fentaNYL  100 mcg      And   • fentaNYL  200 mcg      And   • fentaNYL   Stopped (08/07/20 2997)    And   • dexmedetomidine (PRECEDEX) infusion  0-1.5 mcg/kg/hr 0.2 mcg/kg/hr (08/08/20 0450)        Imaging:  DX-CHEST-PORTABLE (1 VIEW)   Final Result         1.  Interstitial pulmonary parenchymal prominence suggest chronic underlying lung disease, component of interstitial edema and/or infiltrates not excluded. Stable since prior study   2.  Small bilateral pleural effusions, stable since prior study   3.  Hyperexpansion of lungs favors changes of COPD.   4.  Atherosclerosis                  DX-CHEST-PORTABLE (1 VIEW)   Final Result         1.  Interstitial pulmonary parenchymal prominence suggest chronic underlying lung disease, component of interstitial edema and/or infiltrates not excluded. Stable since prior study   2.  Small bilateral pleural effusions, stable since prior study   3.  Hyperexpansion of lungs favors changes of COPD.   4.  Atherosclerosis               DX-CHEST-PORTABLE (1 VIEW)   Final Result         1.  Interstitial pulmonary parenchymal prominence suggest chronic underlying lung disease, component of interstitial edema and/or infiltrates not excluded. Stable since prior study   2.  Small bilateral pleural effusions, stable since prior study   3.  Hyperexpansion of lungs favors changes of  COPD.   4.  Atherosclerosis            DX-CHEST-PORTABLE (1 VIEW)   Final Result         1.  Interstitial pulmonary parenchymal prominence suggest chronic underlying lung disease, component of interstitial edema and/or infiltrates not excluded. Stable since prior study   2.  Small bilateral pleural effusions, stable since prior study   3.  Hyperexpansion of lungs favors changes of COPD.   4.  Atherosclerosis         DX-CHEST-PORTABLE (1 VIEW)   Final Result         1.  Interstitial pulmonary parenchymal prominence suggest chronic underlying lung disease, component of interstitial edema and/or infiltrates not excluded. Stable since prior study   2.  Small bilateral pleural effusions, decreased on the right since prior study   3.  Hyperexpansion of lungs favors changes of COPD.   4.  Atherosclerosis      DX-ABDOMEN FOR TUBE PLACEMENT   Final Result      Feeding tube tip at the distal stomach.      DX-CHEST-LIMITED (1 VIEW)   Final Result         1.  Interstitial pulmonary parenchymal prominence suggest chronic underlying lung disease, component of interstitial edema and/or infiltrates not excluded. Stable since prior study   2.  Left upper lobe nodular density, see dedicated CT performed August 2, 2020 for further characterization.   3.  Small bilateral pleural effusions   4.  Hyperexpansion of lungs favors changes of COPD.   5.  Atherosclerosis   6.  Right internal jugular central line is seen, there is no new central line appreciated since prior study.      DX-ABDOMEN FOR TUBE PLACEMENT   Final Result         1.  Nonspecific bowel gas pattern.   2.  Nasogastric tube tip terminates overlying the expected location of the pylorus or first duodenal segment.      DX-CHEST-PORTABLE (1 VIEW)   Final Result         1.  Interstitial pulmonary parenchymal prominence suggest chronic underlying lung disease, component of interstitial edema and/or infiltrates not excluded.   2.  Left upper lobe nodular density, see dedicated CT  performed today for further characterization.   3.  Small bilateral pleural effusions   4.  Hyperexpansion of lungs favors changes of COPD.   5.  Atherosclerosis      EC-ECHOCARDIOGRAM COMPLETE W/O CONT   Final Result      CT-CHEST (THORAX) WITH   Final Result      1.  Diffuse bullous emphysematous changes of the chest with 3 areas of spiculation, 2 in the left upper lobe, and one in the right lower lobe.      2.  Differential diagnosis includes lung carcinoma versus acute and chronic inflammatory changes associated with bullous disease.      3.  Comparison with prior imaging, if available, is recommended. Alternatively PET/CT or biopsy may be of value to exclude lung carcinoma.      4.  No thoracic adenopathy.      5.  Bibasilar areas of atelectasis or pneumonia.      6.  No significant pleural effusion.      7.  No evidence of acute hemorrhage identified.      Fleischner Society pulmonary nodule recommendations:         CT-HEAD W/O   Final Result      1.  No acute intracranial process.      2.  Atrophy and small vessel ischemic change.      CL-LEFT HEART CATHETERIZATION WITH POSSIBLE INTERVENTION    (Results Pending)         Assessment and plan    * Acute ST elevation myocardial infarction (STEMI) due to occlusion of right coronary artery (HCC)- (present on admission)  Assessment & Plan  - RCA STEMI< 100% occlusion on cath on admission  - S/P KEYLA to RCA, EF 35%  - currently on pressor support but off pressors from 8/7  Plan  - continue ASA, Plavix, statin per Cortrak  - holding off on BB, ACEi at this time due to ongoing need for pressor support  - Cardiology following, appreciate recs    Acute on chronic systolic heart failure (HCC)- (present on admission)  Assessment & Plan  - ischemic cardiomyopathy, EF 35%, left heart cath with KEYLA to RCA for STEMI, cardiac arrest  Plan  - ASA, Plavix, statin, will start BB, ACEi, Aldactone as able, holding for current need for pressors  - cautious diuresis with low dose Lasix  20 mg IV BID     Coronary artery disease due to calcified coronary lesion- (present on admission)  Assessment & Plan  - multivessel disease - 100% RCA occlusion, 100% LCx occlusion, left main 70% stenosis, LAD 30%  - S/P KEYLA to RCA, possible reperfusion injury manifested by PEA, attained ROSC  Plan  - Cardiology following  - ASA, Plavix, statin  - to start BB, ACEi as able    Cardiac arrest (HCC)- (present on admission)  Assessment & Plan  - underlying ischemic heart disease, multivessel disease, initially received tenecteplase at outside facility, had Vfib, attained ROSC with 1 round of ACLS  - subsequently had PEA after cath at [facility], attained ROSC with Epi and one round of ACLS  Plan  - Amiodarone discontinued  - watch for post cath/ MI complications with continuous telemetry monitoring    PAF (paroxysmal atrial fibrillation) ([facility])  Assessment & Plan  - likely from increased arrhythmogenic myocardium post STEMI, cardiac arrest  - PRN Metoprolol for RVR  - CHADSVASC 3,   - Cardiology started on Eliquis today.    COPD (chronic obstructive pulmonary disease) ([facility])- (present on admission)  Assessment & Plan  - underlying COPD, unclear if he is on supplemental O2 support  - currently intubated with 16/480/8/30%,   - sedated but alert, following commands,  Plan  - extubate and try BiPAP.  - RT Per protocol    Leukocytosis- (present on admission)  Assessment & Plan  - likely stress reaction, patient also hypothermic but this could be from hypoperfusion from cardiac arrest  - trending down to normal range, now normothermic  Plan  - Unasyn to cover for aspiration PNA    Ischemic cardiomyopathy (HCC)- (present on admission)  Assessment & Plan  - EF 35% at the time of cardiac catheterization during admission  - currently on ASA, Plavix, statin, holding off on ACEi, aldosterone inhibitor and beta blocker due to continued need for pressor suppport    History of lung cancer  Assessment & Plan  - in 2005, S/P chemo, recent  hemoptysis and friable mass on bronch raising suspicion for recurrence  - repeat bronchoscopy 8/4 with no evidence of endobronchial lesion/ mass, evident only old blood lavaged and sent for studies  - unlikely recurrence of lung cancer  - path from bronchoscopic tissue sample benign    Encephalopathy acute- (present on admission)  Assessment & Plan  - likely toxic, metabolic, possibly hypoxic/ anoxic during Vfib/ PEA but he was alert and able to communicate this AM  - CTM, avoid prolonged hypoxia    Metabolic acidosis- (present on admission)  Assessment & Plan  resolved    Hyperglycemia- (present on admission)  Assessment & Plan  - BG<200, SSI      DISPO: ICU    Code Status: FULL    Quality Measures:  Cesar Catheter: yes  DVT Prophylaxis: Eliquis  Ulcer Prophylaxis: Pepcid  Antibiotics: Unasyn  Lines: PIV lines, RIJ CVC.

## 2020-08-09 PROBLEM — J96.01 ACUTE RESPIRATORY FAILURE WITH HYPOXIA (HCC): Status: ACTIVE | Noted: 2020-01-01

## 2020-08-09 NOTE — PROGRESS NOTES
Reason for consultation /admission : STEMI/out-of-hospital arrest    Katharine extubated yesterday  Not appear to be dyspneic on facemask  Saturation in the high 90s on 5 L/min  Denies chest pain    Monitor my review showed mostly sinus rhythm with occasional brief A. Fib  HR somewhat fast especially when he get agitated at time    Blood pressure has been in 120 range systolic without any vasopressors the last 24 hours    Review of systems; difficult to perform due to his current mental status      Pulse:  [] 81  Resp:  [12-31] 17  BP: (104-174)/(58-96) 134/74  SpO2:  [93 %-100 %] 99 %  GENERAL not in acute distress, not dyspnic at rest, cahcectic  Head atraumatic, normocephalic  ENT neck supple, no JVD, no carotid bruits or thyromegaly  Oral mucosa appears dry  Lung good expansion, distant sound, no rales or wheezing  Heart RRR, normal rate, no murmur,   no gallop or rub  Abd soft, no tenderness, mass or bruits  Ext 1+ edema rt forearm  Skin no ecchymosis or petechiae  Musculoskeletal no deformity  Neuro grossly intact  Psych normal mood, normal affect    Monitor reviewed by me showed no significant ventricular arrhythmias.    Labs: Cr 1.17, K+ 4    Hemoglobin 10      Assessment and plans    1. Inf STEMI D#7 s/p RCA Kyle drug eluting stent  Has residual  prox LCX and around 70% eccentric calcified left main which may nolt be suitable for PCI and will probably require CABG for more optimal revascularization.   No sig ventricular arrhythmias  EF 35%  Not a good surgical candidate.  We will treat him medically for now.  On DAPT  Now with A. fib will be on triple therapy.  Will discontinue aspirin after 2 weeks total or so.  With Kyle drug-eluting stent, also only required dual antiplatelet therapy for 1 month  BP better today, with occasional A. fib RVR will try low-dose metoprolol  We will hold off on ACE inhibitor for now  Continue statin     2. PAF still with some brief recurrence on IV amiodarone  Will  switch to oral amiodarone  With the plan to discontinue amiodarone after his recovery from his acute illness in the near future   Tolerating triple Rx, will continue apixaban     3. HFrEF, prob acute on chronic due to #1  BP improved  As above  We will initiate low-dose ACE inhibitor once blood pressure improved     4. Anemia probably acute on chronic  stable.  Will need closely follow with triple therapy     5. History of lung CA  Path report as above showed no evidence of recurrent CA  hemoptysis resolved      Will follow    Please note that this dictation was created using voice recognition software. I have worked with consultants from the vendor as well as technical experts from [facility] [employer] to optimize the interface. I have made every reasonable attempt to correct obvious errors, but I expect that there are errors of grammar and possibly content I did not discover before finalizing the note          Discontinue amiodarone after recovery from his acute illness in the near future

## 2020-08-09 NOTE — THERAPY
"Speech Language Pathology   Clinical Swallow Evaluation     Patient Name: Sukhi Howard  AGE:  74 y.o., SEX:  male  Medical Record #: [id number]  Today's Date: 8/9/2020     Precautions  Precautions: (P) Swallow Precautions ( See Comments)    Assessment    73 YO  male admitted 8/2/2020 2/2 retrosternal chest pain. PMHx: COPD, emphysema of lung, high cholesterol, hypothyroid and lung cancer. CMHx: acute STEMI, cardiac arrest, COPD, leukocytosis, ischemic cardiomyopathy, hx of lung cancer, encephalopathy acute, metabolic acidosis, hyperglycemia. Head CT \"No acute intracranial process.\" CXR 8/8/20 \"Interstitial pulmonary parenchymal prominence suggest chronic underlying lung disease\".Pt intubated 8/2/20 and extubated 8/8/20    Pt seen this date for clinical swallow evaluation 2/2 extubation 8/8/2020 (intubated 8/6/20). Pt changed to nasal cannula from oxymask by nurse prior to initiation of swallow evaluation. Oral mech exam completed, no gross oral motor deficits appreciated, dentition intact. Volitional cough and vocal quality appreciated to be WNL. PO trials of ice, MTL, liquidized and thins assessed. Hyolaryngeal elevation palpated as complete, timely to 2-3 second delayed initiation of swallow appreciated. Wet vocal quality and decreased vocal intensity appreciated in 75% of trials of MTL. Throat clear appreciated in 50% of trials of liquidized. Pt completed 2-3 swallows per trials of LQ3 and  pt reporting globus sensation in 20% of trials of LQ3. Wet vocal quality and immediate cough appreciated with trials of thins, which is concerning for possible penetration/aspiration.     Given prolonged intubation and overt s/sx of aspiration, recommend pt remain NPO with consideration of a non oral source of nutrition. Okay for 5-7 ice chips per hour with nursing with HOB at 90* to reduce xerostomia and maintain integrity of the swallow musculature.  SLP following.     Plan    Recommend Speech Therapy 5 times per week until " therapy goals are met for the following treatments:  Dysphagia Training and Patient / Family / Caregiver Education.    Discharge Recommendations: (P) Recommend post-acute placement for additional speech therapy services prior to discharge home    Subjective    Pt was awake, alert, followed directions and was eager for evaluation.      Objective       08/09/20 1044   Oral Motor Eval    Is Patient Able to Complete Oral Motor Eval Yes, Within Normal Limits   Laryngeal Function   Voice Quality Within Functional Limits   Volutional Cough Within Functional Limits   Excursion Upon Swallow Complete   Max Phonation Time (Seconds) 5   Oral Food Presentation   Ice Chips Minimal  (throat clear 25%)   Single Swallow Mildly Thick (2) - (Nectar Thick)  Moderate  (wet vocal quality, reduced vocal intensity )   Single Swallow Thin (0) Severe  (wet vocal quality, immediate cough)   Serial Swallow Thin (0) Not Tested   Liquidised (3) Minimal  (throat clear 50%. c/o globus 20%)   Pureed (4) Not Tested   Self Feeding Needs Assistance   Tracheostomy   Tracheostomy  No   Dysphagia Strategies / Recommendations   Strategies / Interventions Recommended (Yes / No) Yes   Compensatory Strategies Strict 1:1 Feeding;Head of Bed 90 Degrees During Eating / Drinking;To Be Assessed;Cough / Clear Wet Vocal Quality Post Swallow   Diet / Liquid Recommendation NPO;Pre-Feeding Trials with SLP Only   Medication Administration  Via Gastric Tube   Therapy Interventions Dysphagia Therapy By Speech Language Pathologist   Short Term Goals   Short Term Goal # 1 Pt will participate in prefeeding trials 1:1 with SLP with no s/sx of aspiraiton and min cues.

## 2020-08-09 NOTE — PROGRESS NOTES
Cortrak Placement    Tube Team verified patient name and medical record number prior to tube placement.  Cortrak tube (43 inches, 10 Marshallese) placed at 62 cm in left nare.  Per Cortrak picture, tube appears to be in the stomach.  Nursing Instructions: Awaiting KUB to confirm placement before use for medications or feeding. Once placement confirmed, flush tube with 30 ml of water, and then remove and save stylet, in patient medication drawer.

## 2020-08-09 NOTE — PROGRESS NOTES
[facility] Progress Note      Admit Date: 8/2/2020  Resident(s): Roberto Carlos Hawkins M.D.   Attending: JIA Nolasco/ LINO Tirado    Date & Time:   8/9/2020   11:39 AM       Patient ID:    Name:             Sukhi Howard     YOB: 1945  Age:                 74 y.o.  male   MRN:               [id number]      ID:  74 yr old male with PMHx of DLD, hypothyroidism, COPD, lung cancer S/P chemotherapy in 2005, presented 8/2/20 as a transfer from [facility] where he presented for sudden retrosternal chest pain 8/10, found to have an inferior STEMI, received tenecteplase, following which he had an episode of Vfib needing defibrillation and 1 round of ACLS. He was then intubated, following which high volume hemoptysis was noted necessitating TXA and Kcentra. He was started on Amiodarone gtt, Fentanyl, obtunded on admission with GCS 3. He underwent a bronchoscopy on admission significant for a right mainstem bronchus friable mass which was removed and sent to path, which was benign. GCS improved to 11T, he was taken to the cath lab and underwent PCI with KEYLA to RCA. Early on 8/3/20 AM he had PEA and Code Blue was called with him attaining ROSC after Epinephrine and 1 round of ACLS. He was also noted to be leukocytic and hypothermic to 35 C, currently normothermic. He is currently off pressors, off sedation, alert and oriented, extubated to BiPAP yesterday and now on Oxymask. He is also in atrial fibrillation with controlled ventricular rate.        Interval Events:  - extubated to BiPAP successfully yesterday, was agitated and removed it last night, currently AOx4, not on sedated and on 4 Lpm Oxy-mask  - atrial fibrillation with controlled rate, PRN IV Metoprolol, on Apixaban, PO Metoprolol pending SLP eval today  - PT/OT eval, edge of bed today  - Cards recs continuation of Amiodarone for now  - on triple therapy with ASA, Plavix, Eliquis  - off Lasix, good UOP, hypernatremia  - free water flushes for  hypernatremia per Cortrak pending SLP eval  - +7 L SA  - BM this AM after Relistor, BS+   - afebrile, complete course of Unasyn, EBC count trending down  - CXR with b/l small pleural effusions                Review of Systems   Constitutional: Negative for fever and malaise/fatigue.   HENT: Negative for sinus pain.    Eyes: Negative for blurred vision, double vision and photophobia.   Respiratory: Positive for sputum production. Negative for cough, shortness of breath and stridor.    Cardiovascular: Positive for leg swelling. Negative for chest pain and orthopnea.   Gastrointestinal: Negative for abdominal pain, blood in stool, constipation, diarrhea, nausea and vomiting.   Genitourinary: Negative for dysuria, frequency and urgency.   Musculoskeletal: Negative for joint pain and myalgias.   Skin: Negative for itching and rash.   Neurological: Positive for weakness. Negative for tingling, focal weakness and headaches.   Psychiatric/Behavioral: Negative for suicidal ideas. The patient is not nervous/anxious and does not have insomnia.    All other systems reviewed and are negative.      VITALS:  Pulse: 84  Blood Pressure : 134/74      Monitored Temp 2  Av.8 °C (98.3 °F)  Min: 36.4 °C (97.5 °F)  Max: 37.4 °C (99.3 °F)         Physical Exam:  Physical Exam  GEN: Vented, Sedated.  But patient is alert, following commands, moving all extremities  HEENT: NC/AT. ET tube securely in place. NGT in place.  Cardiovascular: Afib  Pulmonary: Effort normal breath sounds good no wheezing no crackles  Abdominal: Soft, bowel sounds sluggish, slight distention nontender  Genitourinary: On Cesar's  Musculoskeletal: Moving all the extremities  Skin warm and dry  Neurological: Intubated but alert and following commands    Consultants:  Cardiology    HemoDynamics:  Pulse: 84 Blood Pressure : 134/74       Respiratory:     Respiration: 20, Pulse Oximetry: 93 %    Chest Tube Drains:    Recent Labs     20  0526 20  0500    ISTATTEMP 36.6 C 36.7 C   ISTATFIO2 30 30   ISTATSPEC Venous Venous       Fluids:  Intake/Output       08/07/20 0700 - 08/08/20 0659 08/08/20 0700 - 08/09/20 0659 08/09/20 0700 - 08/10/20 0659      0700-1859 1900-0659 Total 0700-1859 1900-0659 Total 0700-1859 1900-0659 Total       Intake    I.V.  359.8  -- 359.8  533.3  302.1 835.4  87  -- 87    Precedex Volume 291.5 -- 291.5 171.4 98.1 269.5 19 -- 19    Amiodarone Volume -- -- -- 361.9 204 565.9 68 -- 68    Norepinephrine Volume 68.3 -- 68.3 0 -- 0 -- -- --    Other  --  -- --  250  -- 250  --  -- --    Medications (PO/Enteral Liquids) -- -- -- 250 -- 250 -- -- --    NG/GT  0  -- 0  0  -- 0  --  -- --    Intake (mL) ([REMOVED] Enteral Tube 08/03/20 Cortrak - Gastric 10 Fr. Oral) 0 -- 0 0 -- 0 -- -- --    IV Piggyback  1017.8  100 1117.8  200  100 300  100  -- 100    Volume (mL) (potassium phosphate 15 mmol in D5W 250 mL ivpb) 249.9 -- 249.9 -- -- -- -- -- --    Volume (mL) (potassium phosphate 15 mmol in D5W 250 mL ivpb) 264.6 -- 264.6 -- -- -- -- -- --    Volume (mL) (ampicillin/sulbactam (UNASYN) 3 g in  mL IVPB) 503.3 100 603.3 200 100 300 100 -- 100    Enteral  30  -- 30  --  -- --  --  -- --    Free Water / Tube Flush 30 -- 30 -- -- -- -- -- --    Total Intake 1407.6 100 1507.6 983.3 402.1 1385.4 187 -- 187       Output    Urine  810  1400 2210  750  750 1500  250  -- 250    Output (mL) (Urethral Catheter Temperature probe) 810 1400 2210  250 -- 250    Stool  --  -- --  --  -- --  --  -- --    Number of Times Stooled -- 1 x 1 x 1 x 3 x 4 x 0 x -- 0 x    Total Output 810 1400 2210  250 -- 250       Net I/O     597.6 -1300 -702.4 233.3 -348 -114.6 -63 -- -63        Weight: 73.1 kg (161 lb 2.5 oz)  Recent Labs     08/07/20  0445 08/08/20  0515 08/09/20  0350   SODIUM 147* 152* 153*   POTASSIUM 3.7 3.7 4.0   CHLORIDE 104 106 108   CO2 33 33 34*   BUN 40* 38* 41*   CREATININE 0.91 1.02 1.17   PHOSPHORUS 2.3* 6.6*  --    CALCIUM 7.7*  7.7* 7.7*     Body mass index is 27.66 kg/m².    GI/Nutrition:  Recent Labs     20  035   GLUCOSE 172* 126* 107*       Heme:  Recent Labs     20  0350   RBC 3.12* 3.38* 3.40*   HEMOGLOBIN 9.7* 10.5* 10.5*   HEMATOCRIT 30.0* 32.3* 33.0*   PLATELETCT 168 237 278       Infectious Disease:  Monitored Temp 2  Av.8 °C (98.3 °F)  Min: 36.4 °C (97.5 °F)  Max: 37.4 °C (99.3 °F)  Recent Labs     20  035   WBC 15.1* 15.0* 12.4*   NEUTSPOLYS 86.30* 83.00* 92.10*   LYMPHOCYTES 2.30* 2.90* 0.90*   MONOCYTES 10.20 12.50 3.50   EOSINOPHILS 0.30 0.60 0.00   BASOPHILS 0.40 0.30 0.00       Medications:  • hydrocortisone sodium succinate PF  50 mg     • Metoprolol Tartrate  5 mg     • Pharmacy  1 Each     • apixaban  5 mg     • aspirin  81 mg     • clopidogrel  75 mg     • metoprolol  25 mg     • risperiDONE  1 mg     • amiodarone infusion  0.5-1 mg/min 0.5 mg/min (20 0037)   • budesonide-formoterol  2 Puff     • racepinephrine  0.5 mL     • ipratropium-albuterol  3 mL     • atorvastatin  40 mg     • acetaminophen  650 mg     • ampicillin-sulbactam (UNASYN) IV  3 g Stopped (20 0630)   • Respiratory Therapy Consult       • ipratropium-albuterol  3 mL     • MD Alert...Adult ICU Electrolyte Replacement per Pharmacy       • senna-docusate  2 Tab      And   • polyethylene glycol/lytes  1 Packet      And   • magnesium hydroxide  30 mL      And   • bisacodyl  10 mg     • labetalol  10 mg     • dexmedetomidine (PRECEDEX) infusion  0-1.5 mcg/kg/hr Stopped (20 0800)        Imaging:  DX-CHEST-PORTABLE (1 VIEW)   Final Result      Removal of endotracheal and Dobbhoff tubes.      No other significant interval change.      DX-CHEST-PORTABLE (1 VIEW)   Final Result         1.  Interstitial pulmonary parenchymal prominence suggest chronic underlying lung disease, component of interstitial edema and/or infiltrates not  excluded. Stable since prior study   2.  Small bilateral pleural effusions, stable since prior study   3.  Hyperexpansion of lungs favors changes of COPD.   4.  Atherosclerosis                  DX-CHEST-PORTABLE (1 VIEW)   Final Result         1.  Interstitial pulmonary parenchymal prominence suggest chronic underlying lung disease, component of interstitial edema and/or infiltrates not excluded. Stable since prior study   2.  Small bilateral pleural effusions, stable since prior study   3.  Hyperexpansion of lungs favors changes of COPD.   4.  Atherosclerosis               DX-CHEST-PORTABLE (1 VIEW)   Final Result         1.  Interstitial pulmonary parenchymal prominence suggest chronic underlying lung disease, component of interstitial edema and/or infiltrates not excluded. Stable since prior study   2.  Small bilateral pleural effusions, stable since prior study   3.  Hyperexpansion of lungs favors changes of COPD.   4.  Atherosclerosis            DX-CHEST-PORTABLE (1 VIEW)   Final Result         1.  Interstitial pulmonary parenchymal prominence suggest chronic underlying lung disease, component of interstitial edema and/or infiltrates not excluded. Stable since prior study   2.  Small bilateral pleural effusions, stable since prior study   3.  Hyperexpansion of lungs favors changes of COPD.   4.  Atherosclerosis         DX-CHEST-PORTABLE (1 VIEW)   Final Result         1.  Interstitial pulmonary parenchymal prominence suggest chronic underlying lung disease, component of interstitial edema and/or infiltrates not excluded. Stable since prior study   2.  Small bilateral pleural effusions, decreased on the right since prior study   3.  Hyperexpansion of lungs favors changes of COPD.   4.  Atherosclerosis      DX-ABDOMEN FOR TUBE PLACEMENT   Final Result      Feeding tube tip at the distal stomach.      DX-CHEST-LIMITED (1 VIEW)   Final Result         1.  Interstitial pulmonary parenchymal prominence suggest chronic  underlying lung disease, component of interstitial edema and/or infiltrates not excluded. Stable since prior study   2.  Left upper lobe nodular density, see dedicated CT performed August 2, 2020 for further characterization.   3.  Small bilateral pleural effusions   4.  Hyperexpansion of lungs favors changes of COPD.   5.  Atherosclerosis   6.  Right internal jugular central line is seen, there is no new central line appreciated since prior study.      DX-ABDOMEN FOR TUBE PLACEMENT   Final Result         1.  Nonspecific bowel gas pattern.   2.  Nasogastric tube tip terminates overlying the expected location of the pylorus or first duodenal segment.      DX-CHEST-PORTABLE (1 VIEW)   Final Result         1.  Interstitial pulmonary parenchymal prominence suggest chronic underlying lung disease, component of interstitial edema and/or infiltrates not excluded.   2.  Left upper lobe nodular density, see dedicated CT performed today for further characterization.   3.  Small bilateral pleural effusions   4.  Hyperexpansion of lungs favors changes of COPD.   5.  Atherosclerosis      EC-ECHOCARDIOGRAM COMPLETE W/O CONT   Final Result      CT-CHEST (THORAX) WITH   Final Result      1.  Diffuse bullous emphysematous changes of the chest with 3 areas of spiculation, 2 in the left upper lobe, and one in the right lower lobe.      2.  Differential diagnosis includes lung carcinoma versus acute and chronic inflammatory changes associated with bullous disease.      3.  Comparison with prior imaging, if available, is recommended. Alternatively PET/CT or biopsy may be of value to exclude lung carcinoma.      4.  No thoracic adenopathy.      5.  Bibasilar areas of atelectasis or pneumonia.      6.  No significant pleural effusion.      7.  No evidence of acute hemorrhage identified.      Fleischner Society pulmonary nodule recommendations:         CT-HEAD W/O   Final Result      1.  No acute intracranial process.      2.  Atrophy and  small vessel ischemic change.      CL-LEFT HEART CATHETERIZATION WITH POSSIBLE INTERVENTION    (Results Pending)         Assessment and plan    * Acute ST elevation myocardial infarction (STEMI) due to occlusion of right coronary artery (HCC)- (present on admission)  Assessment & Plan  - RCA STEMI < 100% occlusion on cath on admission  - S/P KEYLA to RCA, EF 35%  - currently on pressor support but off pressors from 8/7  Plan  - continue ASA, Plavix, statin per Cortrak  - Cardiology following, appreciate recs, Metoprolol pending SLP eval    Acute on chronic systolic heart failure (HCC)- (present on admission)  Assessment & Plan  - ischemic cardiomyopathy, EF 35%, left heart cath with KEYLA to RCA for STEMI, cardiac arrest  Plan  - ASA, Plavix, statin, will start BB, ACEi, Aldactone as able, holding for current need for pressors  - Lasix discontinued for hypernatremia    Coronary artery disease due to calcified coronary lesion- (present on admission)  Assessment & Plan  - multivessel disease - 100% RCA occlusion, 100% LCx occlusion, left main 70% stenosis, LAD 30%  - S/P KEYLA to RCA, possible reperfusion injury manifested by PEA, attained ROSC  Plan  - Cardiology following  - ASA, Plavix, statin, Metoprolol pending SLP eval  - to start ACEi as able    Cardiac arrest (HCC)- (present on admission)  Assessment & Plan  - underlying ischemic heart disease, multivessel disease, initially received tenecteplase at outside facility, had Vfib, attained ROSC with 1 round of ACLS  - subsequently had PEA after cath at [facility], attained ROSC with Epi and one round of ACLS  Plan  - Amiodarone to continue per Cardiology  - watch for post cath/ MI complications with continuous telemetry monitoring    PAF (paroxysmal atrial fibrillation) ([facility])  Assessment & Plan  - likely from increased arrhythmogenic myocardium post STEMI, cardiac arrest  - PRN Metoprolol IV for RVR  - scheduled PO Metoprolol  - CHADSVASC 3, on Eliquis    COPD (chronic  obstructive pulmonary disease) (HCC)- (present on admission)  Assessment & Plan  - underlying COPD, unclear if he is on supplemental O2 support at home  - extubated successfully on 8/8 initially to BiPAP, currently saturating well on Oxymask at 40%  - Symbicort, Duonebs, RT per protocol    Leukocytosis- (present on admission)  Assessment & Plan  - likely stress reaction, patient also hypothermic but this could be from hypoperfusion from cardiac arrest  - trending down to normal range, now normothermic  Plan  - Unasyn to cover for aspiration PNA, complete course    Ischemic cardiomyopathy (HCC)- (present on admission)  Assessment & Plan  - EF 35% at the time of cardiac catheterization during admission  - currently on ASA, Plavix, statin, Metoprolol    History of lung cancer  Assessment & Plan  - in 2005, S/P chemo, recent hemoptysis and friable mass on bronch raising suspicion for recurrence  - repeat bronchoscopy 8/4 with no evidence of endobronchial lesion/ mass, evident only old blood lavaged and sent for studies  - unlikely recurrence of lung cancer  - path from bronchoscopic tissue sample benign    Encephalopathy acute- (present on admission)  Assessment & Plan  - likely toxic, metabolic, possibly hypoxic/ anoxic during Vfib/ PEA but he was alert and able to communicate this AM  - resolved, alert, oriented, able to follow commands    Metabolic acidosis- (present on admission)  Assessment & Plan  resolved    Hyperglycemia- (present on admission)  Assessment & Plan  - BG<200, SSI      DISPO: ICU    Code Status: FULL    Quality Measures:  Cesar Catheter: yes  DVT Prophylaxis: Eliquis  Ulcer Prophylaxis: Pepcid  Antibiotics: Unasyn  Lines: PIV lines, RIJ CVC.

## 2020-08-09 NOTE — ASSESSMENT & PLAN NOTE
Intubated after cardiac arrest in the setting of NSTEMI with hemoptysis after intubation - 8/2  Liberated from ventilator 8/8  Continue to titrate oxygen, high flow nasal cannula, increase mobility.  PRN lasix, holding now due to uremia and hypernatremia

## 2020-08-09 NOTE — DIETARY
Nutrition support note:  Received TF consult, pt failed SLP eval.   Current TF order still appropriate, Impact 1.5 with goal rate of 55 mL/hr.    RD following, weekly update due tomorrow.

## 2020-08-09 NOTE — PROGRESS NOTES
Patient agitated, removing equipment, interfering with medical interventions. Per Dr Palmira mckenna to re-restrain. Bilateral soft wrist restraints applied.

## 2020-08-09 NOTE — CARE PLAN
Problem: Safety  Goal: Will remain free from falls  Outcome: PROGRESSING AS EXPECTED   The patient is currently in bed with bed locked, in lowest position, and three bed rails raised. The patient has been educated on the call light, has it within reach, and uses it appropriately. The patient has all belongings within reach, and is wearing non-skid footwear. Will continue hourly rounding.     Problem: Respiratory:  Goal: Respiratory status will improve  Outcome: PROGRESSING SLOWER THAN EXPECTED  Note: The patient has been educated on the need to turn cough and deep breathe. The patient's cough is weak, but productive, and requires frequent suctioning for this secretions. The patient needs assistance with their is, but is teachable. Will continue to monitor respiratory progress.

## 2020-08-09 NOTE — PROGRESS NOTES
"Critical Care Progress Note    Date of admission  8/2/2020    Chief Complaint  74 y.o. male admitted 8/2/2020 with cardiac arrest, STEMI, resp failure    Hospital Course    74 y.o. male with a past medical history of hypercholesterolemia, hypothyroidism, COPD, lung cancer status post chemotherapy in 2005 who presented 8/2/2020 as a transfer from [facility] where he presented for sudden onset of retrosternal crushing chest pain which was rated at that time as 8 out of 10 and radiating to the left arm.  He was found to have an inferior ST elevation myocardial infarction and received tenecteplase for treatment.  He subsequently had a reperfusion ventricular fibrillation which required defibrillation and 1 round of ACLS.  He was intubated with RSI at 1615, following this intubation hemoptysis was noted and was reported to the transport team as \"a liter and a half of serosanguinous fluid\".  He was given TXA and Kcentra, for attempted reversal of TNKase, it is unknown if the outside facility has cryoprecipitate.  He was started on amiodarone and propofol and transferred to our facility for higher level of care.  At [facility] had PCI and KEYLA to RCA with residual  LCX; EF 35%.    8/3 - brief PEA arrest ~ 1 am, received additional crystalloid and PRBCs given protamine for heparin reversal, inhaled TXA.  Hemoglobin had dropped to 7.8 and received 2U PRBCs, full ventilator support, titrating down epinephrine and norepinephrine infusions, arouses and follows commands.  WBC 28.7, suspect stress reaction but Unasyn initiated for possible pneumonia/aspiration.   8/4 - some ongoing bloody secretions from ET tube; repeat bronchoscopy with old blood and airways lavaged without evidence of active bleeding, BAL sent for culture.  On/off norepinephrine infusion, off epinephrine.  Stop bicarbonate infusion today.  Not yet ready for extubation but awake and following commands.  Okay to stop amiodarone per cardiology  8/5 - SBT but not " appropriate for liberation, weaning sedation, advancing tube feed, low-dose norepinephrine, consider diuresis soon, remains on Unasyn with negative cultures to date.  Less bloody secretions in ET tube today status post bronchoscopy yesterday with no clear site for bleeding.  8/6 - path from airway mass 8/3 no malignant cells; starting stress dose steroid for ? PURI, add lasix as jayshree;   8/7 - full ventilator support, Lasix, not ready for extubation; no bowel movement, given Relistor today  8/8 -extubated to BiPAP and tolerating well, sodium high 152, Lasix stopped, Risperdal added for anxiety/agitation  8/9 -off BiPAP, currently on high flow nasal cannula.  Failed swallow and core track placed, start tube feed and free water        Interval Problem Update  Reviewed last 24 hour events:  A/o x 4, off dex  Int AF; starting metoprolol   - 120  BiPAP last night; now 6 lpm oxy mask  TM = 99.3  I/O = 1.3/1.5 (-114, net +7L since admit)  2+ edema  + BM  WBC 12.  Na up 153  Lasix stopped; add free water  Cr 0.91 -> 1.02 -> 1.17  Unasyn day day 7 of 10  Eliquis, IV amio, DAPT, ASA to stop soon  Risperdal day 2 for agitated delirium  Solucortef for PURI, off norepi; taper off  PT/OT/SLP  Mobilize today    Review of Systems  Review of Systems   Unable to perform ROS: Acuity of condition        Vital Signs for last 24 hours   Pulse:  [] 68  Resp:  [12-31] 16  BP: (104-174)/(58-96) 133/63  SpO2:  [95 %-100 %] 100 %    Hemodynamic parameters for last 24 hours       Respiratory Information for the last 24 hours       Physical Exam   Physical Exam  Vitals signs and nursing note reviewed.   Constitutional:       Appearance: He is well-developed. He is ill-appearing.      Interventions: He is intubated.   HENT:      Head: Normocephalic and atraumatic.      Right Ear: External ear normal.      Left Ear: External ear normal.      Nose: Nose normal.      Mouth/Throat:      Mouth: Mucous membranes are moist.   Eyes:       Conjunctiva/sclera: Conjunctivae normal.   Neck:      Musculoskeletal: Neck supple.      Vascular: No JVD.      Trachea: No tracheal deviation.   Cardiovascular:      Rate and Rhythm: Normal rate and regular rhythm.      Pulses: Normal pulses.      Comments: Intermittent atrial fibrillation  Pulmonary:      Effort: He is intubated.      Breath sounds: Rales present. No wheezing.      Comments: High flow nasal cannula today  Chest:      Comments: Barrel chested  Abdominal:      General: Bowel sounds are normal. There is no distension.      Palpations: Abdomen is soft.   Musculoskeletal:         General: No tenderness.   Skin:     General: Skin is dry.      Capillary Refill: Capillary refill takes 2 to 3 seconds.      Findings: No rash.      Comments: Slight clubbing; Multiple contusions to bilateral forearms   Neurological:      Comments: A/O x3, follows commands, odd affect, moves all extremities symmetrically   Psychiatric:      Comments: Unable to assess         Medications  Current Facility-Administered Medications   Medication Dose Route Frequency Provider Last Rate Last Dose   • risperiDONE (RISPERDAL) tablet 1 mg  1 mg Enteral Tube BID Eduardo Gaitan M.D.   Stopped at 08/08/20 1800   • apixaban (ELIQUIS) tablet 5 mg  5 mg Oral BID Kristyn Jon M.D.   Stopped at 08/08/20 1800   • amiodarone (NEXTERONE) 360 mg/200 mL infusion  0.5-1 mg/min Intravenous Continuous Kristyn Jon M.D. 17 mL/hr at 08/09/20 0037 0.5 mg/min at 08/09/20 0037   • budesonide-formoterol (SYMBICORT) 80-4.5 MCG/ACT inhaler 2 Puff  2 Puff Inhalation BID (RT) Eduardo Gaitan M.D.   Stopped at 08/08/20 2000   • racepinephrine (MICRONEFRIN) 2.25 % nebulizer solution 0.5 mL  0.5 mL Nebulization Q30MIN PRN (RT) Eduardo Gaitan M.D.       • hydrocortisone sodium succinate PF (SOLU-CORTEF) 100 MG injection 50 mg  50 mg Intravenous Q8HRS Eduardo Gaitan M.D.   50 mg at 08/09/20 0601   • potassium bicarbonate (KLYTE) effervescent  tablet 50 mEq  50 mEq Enteral Tube BID Eduardo Gaitan M.D.   Stopped at 08/08/20 0506   • ipratropium-albuterol (DUONEB) nebulizer solution  3 mL Nebulization Q4HRS (RT) Harsha Chavis M.D.   3 mL at 08/09/20 0630   • norepinephrine (Levophed) infusion 8 mg/250 mL (premix)  0-30 mcg/min Intravenous Continuous Poncho Sy Jr., LUARA.OSalma   Stopped at 08/07/20 1745   • Pharmacy Consult: Enteral tube insertion - review meds/change route/product selection  1 Each Other PHARMACY TO DOSE Eduardo Gaitan M.D.       • aspirin (ASA) chewable tab 81 mg  81 mg Enteral Tube DAILY Eduardo Gaitan M.D.   Stopped at 08/09/20 0600   • atorvastatin (LIPITOR) tablet 40 mg  40 mg Enteral Tube Q EVENING Eduardo Gaitan M.D.   Stopped at 08/08/20 1800   • acetaminophen (TYLENOL) tablet 650 mg  650 mg Enteral Tube Q6HRS PRN Eduardo Gaitan M.D.       • clopidogrel (PLAVIX) tablet 75 mg  75 mg Enteral Tube DAILY Eduardo Gaitan M.D.   Stopped at 08/09/20 0600   • ampicillin/sulbactam (UNASYN) 3 g in  mL IVPB  3 g Intravenous Q6HRS Igor Aldana M.D.   Stopped at 08/09/20 0630   • Respiratory Therapy Consult   Nebulization Continuous RT LAURA Evans Jr..O.       • ipratropium-albuterol (DUONEB) nebulizer solution  3 mL Nebulization Q2HRS PRN (RT) LAURA Evans Jr..OSalma   3 mL at 08/05/20 0240   • MD Alert...ICU Electrolyte Replacement per Pharmacy   Other PHARMACY TO DOSE LAURA Evans Jr..O.       • senna-docusate (PERICOLACE or SENOKOT S) 8.6-50 MG per tablet 2 Tab  2 Tab Enteral Tube BID LAURA Evans Jr..O.   Stopped at 08/08/20 1800    And   • polyethylene glycol/lytes (MIRALAX) PACKET 1 Packet  1 Packet Enteral Tube QDAY PRN LAURA Evans Jr..O.   1 Packet at 08/07/20 1015    And   • magnesium hydroxide (MILK OF MAGNESIA) suspension 30 mL  30 mL Enteral Tube QDAY PRN Poncho Sy Jr., D.O.   30 mL at 08/05/20 1732    And   • bisacodyl (DULCOLAX) suppository 10 mg  10 mg Rectal QDAY PRN Poncho BABRER  Yossi Waters, D.O.   10 mg at 08/06/20 1735   • labetalol (NORMODYNE/TRANDATE) injection 10 mg  10 mg Intravenous Q4HRS PRN Poncho Sy Jr., D.O.   10 mg at 08/09/20 0111   • dexmedetomidine (PRECEDEX) 400 mcg/100mL NS premix infusion  0-1.5 mcg/kg/hr Intravenous Continuous Poncho Sy Jr., D.O. 9.5 mL/hr at 08/09/20 0034 0.6 mcg/kg/hr at 08/09/20 0034       Fluids    Intake/Output Summary (Last 24 hours) at 8/9/2020 0758  Last data filed at 8/9/2020 0600  Gross per 24 hour   Intake 1385.38 ml   Output 1500 ml   Net -114.62 ml       Laboratory  Recent Labs     08/07/20  0526 08/08/20  0500   ISTATTEMP 36.6 C 36.7 C   ISTATFIO2 30 30   ISTATSPEC Venous Venous         Recent Labs     08/06/20  0800 08/07/20 0445 08/08/20  0515 08/09/20  0350   SODIUM  --  147* 152* 153*   POTASSIUM  --  3.7 3.7 4.0   CHLORIDE  --  104 106 108   CO2  --  33 33 34*   BUN  --  40* 38* 41*   CREATININE  --  0.91 1.02 1.17   MAGNESIUM 2.0  --   --   --    PHOSPHORUS  --  2.3* 6.6*  --    CALCIUM  --  7.7* 7.7* 7.7*     Recent Labs     08/07/20 0445 08/08/20  0515 08/09/20  0350   GLUCOSE 172* 126* 107*     Recent Labs     08/07/20 0445 08/08/20  0515 08/09/20  0350   WBC 15.1* 15.0* 12.4*   NEUTSPOLYS 86.30* 83.00* 92.10*   LYMPHOCYTES 2.30* 2.90* 0.90*   MONOCYTES 10.20 12.50 3.50   EOSINOPHILS 0.30 0.60 0.00   BASOPHILS 0.40 0.30 0.00     Recent Labs     08/07/20 0445 08/08/20  0515 08/09/20  0350   RBC 3.12* 3.38* 3.40*   HEMOGLOBIN 9.7* 10.5* 10.5*   HEMATOCRIT 30.0* 32.3* 33.0*   PLATELETCT 168 237 278       Imaging  X-Ray:  I have personally reviewed the images and compared with prior images.    Assessment/Plan  * Acute on chronic systolic heart failure (HCC)- (present on admission)  Assessment & Plan  Intubated after cardiac arrest in the setting of NSTEMI with hemoptysis after intubation - 8/2  Liberated from ventilator 8/8  Continue titrated oxygen, high flow nasal cannula, increase mobility    Cardiac arrest (HCC)-  (present on admission)  Assessment & Plan  Reportedly V. fib arrest at outside hospital, ? Reperfusion following TNKase  Continue amiodarone  Monitor for recurrent arrhythmias, optimize electrolytes    Acute ST elevation myocardial infarction (STEMI) due to occlusion of right coronary artery (HCC)- (present on admission)  Assessment & Plan  Taken to Cath Lab on 8/2/2020, KEYLA placed in RCA  Cardiology following  DAPT  Reduced EF 35%  Meds reviewed, cardiology following    PAF (paroxysmal atrial fibrillation) ([facility])  Assessment & Plan  Rate control, started on metoprolol, anticoagulation with Eliquis now    COPD (chronic obstructive pulmonary disease) ([facility])- (present on admission)  Assessment & Plan  Not in acute exacerbation  RT/O2 Protocols  Titrate supplemental FiO2 to maintain SpO2 >92%    Leukocytosis- (present on admission)  Assessment & Plan  Unasyn, day 7 of 10 pneumonia with airway bleeding  Cultures negative    Ischemic cardiomyopathy ([facility])- (present on admission)  Assessment & Plan  Left ventricular ejection fraction of 35%  Inotrope for inotropic support  Careful volume resuscitation  Will need beta-blocker, ACE inhibitor and diuresis as tolerated    History of lung cancer  Assessment & Plan  Reportedly treated in 2005  3 spiculated masses present on CT chest  Endobronchial mobile tissue mass benign bronchial mucosa, suspect traumatic intubation with bleeding post T and K  Repeat bronchoscopy without any airway lesions noted  Follow-up CT scan 1 to 2 months after recovers    Encephalopathy acute- (present on admission)  Assessment & Plan  Limit sedatives  GCS improved to 11T, limit sedatives not a temperature management candidate    Hyperglycemia- (present on admission)  Assessment & Plan  Goal blood glucose 120-180  sliding scale insulin, accuchecks  hypoglycemia protocol     Updated plan:  Continue high flow nasal cannula/titrated oxygen as needed  Lasix held with increased creatinine, sodium, free water  added today and close follow-up to resume as tolerated  Failed swallow study evaluation, place core track and start enteral nutrition medications  Complete course of Unasyn  PT/OT      VTE:  Contraindicated  Ulcer: H2 Antagonist  Lines: Central Line  Ongoing indication addressed    I have performed a physical exam and reviewed and updated ROS and Plan today (8/9/2020). In review of yesterday's note (8/8/2020), there are no changes except as documented above.     Discussed patient condition and risk of morbidity and/or mortality with RN, RT, Pharmacy and [facility] resident  The patient remains critically ill.  Patient remains critically ill and a high risk for further deterioration will be followed very closely in the ICU.  Critical care time = 33 minutes in directly providing and coordinating critical care and extensive data review.  No time overlap and excludes procedures.

## 2020-08-10 NOTE — CARE PLAN
Problem: Communication  Goal: The ability to communicate needs accurately and effectively will improve  Outcome: PROGRESSING AS EXPECTED     Problem: Safety  Goal: Will remain free from injury  Outcome: PROGRESSING AS EXPECTED  Goal: Will remain free from falls  Outcome: PROGRESSING AS EXPECTED     Problem: Infection  Goal: Will remain free from infection  Outcome: PROGRESSING AS EXPECTED     Problem: Pain Management  Goal: Pain level will decrease to patient's comfort goal  Outcome: PROGRESSING AS EXPECTED     Problem: Respiratory:  Goal: Respiratory status will improve  Outcome: PROGRESSING SLOWER THAN EXPECTED

## 2020-08-10 NOTE — PROGRESS NOTES
[facility] Progress Note      Admit Date: 8/2/2020  Resident(s): Stephen Fay  Attending: JIA Nolasco/ Dr. Sy    Date & Time:   8/10/2020   2:48 PM       Patient ID:    Name:             Sukhi Howard     YOB: 1945  Age:                 74 y.o.  male   MRN:               [id number]      ID:  74 yr old male with PMHx of DLD, hypothyroidism, COPD, lung cancer S/P chemotherapy in 2005, presented 8/2/20 as a transfer from [facility] where he presented for sudden retrosternal chest pain 8/10, found to have an inferior STEMI, received tenecteplase, following which he had an episode of Vfib needing defibrillation and 1 round of ACLS. He was then intubated, following which high volume hemoptysis was noted necessitating TXA and Kcentra. He was started on Amiodarone gtt, Fentanyl, obtunded on admission with GCS 3. He underwent a bronchoscopy on admission significant for a right mainstem bronchus friable mass which was removed and sent to path, which was benign. GCS improved to 11T, he was taken to the cath lab and underwent PCI with KEYLA to RCA. Early on 8/3/20 AM he had PEA and Code Blue was called with him attaining ROSC after Epinephrine and 1 round of ACLS. He was also noted to be leukocytic and hypothermic to 35 C, currently normothermic. He is currently off pressors, off sedation, alert and oriented, extubated to BiPAP yesterday and now on Oxymask. He is also in atrial fibrillation with controlled ventricular rate.        Interval Events:    -overnight pulled cortrak and central line  -cheri GUZMÁN on night of 8/9 discussed code status with patient, and patient elected for DNAR/DNI    -not tolerating bipap refuses, is oriented x4.  On HFNC 60 liters.  - stop risperdal.  Delirium environmental precautions  - atrial fibrillation with controlled rate, PRN IV Metoprolol, on Apixaban, PO Metoprolol  - Cards recs continuation of Amiodarone for now  - on triple therapy with ASA, Plavix, Eliquis  - off Lasix, good  UOP  - hypernatremia 157.  Increased free water flushes  - last BM 8/10  - afebrile, complete course of Unasyn, EBC count trending down      Review of Systems   Constitutional: Negative for fever and malaise/fatigue.   HENT: Negative for sinus pain.    Eyes: Negative for blurred vision, double vision and photophobia.   Respiratory: Positive for sputum production. Negative for cough, shortness of breath and stridor.    Cardiovascular: Negative for chest pain and orthopnea.   Gastrointestinal: Negative for abdominal pain, blood in stool, constipation, diarrhea, nausea and vomiting.   Genitourinary: Negative for dysuria, frequency and urgency.   Musculoskeletal: Negative for joint pain and myalgias.   Skin: Negative for itching and rash.   Neurological: Positive for weakness. Negative for tingling, focal weakness and headaches.   Psychiatric/Behavioral: Negative for suicidal ideas. The patient is not nervous/anxious and does not have insomnia.    All other systems reviewed and are negative.      VITALS:  Pulse: 95  Blood Pressure : 159/64      Monitored Temp 2  Av.9 °C (98.5 °F)  Min: 36.1 °C (97 °F)  Max: 37.6 °C (99.7 °F)  Temp  Av.5 °C (97.7 °F)  Min: 36.3 °C (97.3 °F)  Max: 36.7 °C (98.1 °F)         Physical Exam:  Physical Exam     GEN: HFNC in place. Patient is oriented x4, but somewhat agitated  HEENT: NC/AT.  NGT in place.  Cardiovascular: Afib rate controlled  Pulmonary: Effort normal breath sounds good no wheezing no crackles  Abdominal: Soft, bowel sounds sluggish, slight distention nontender  Genitourinary: Cesar in place  Musculoskeletal: Moving all the extremities. No extremity edema  Skin warm and dry  Neurological:  alert and following commands    Consultants:  Cardiology    HemoDynamics:  Pulse: 95 Blood Pressure : 159/64       Respiratory:     Respiration: 20, Pulse Oximetry: 100 %    Chest Tube Drains:    Recent Labs     20  0500 08/10/20  0119   ISTATAPH  --  7.530*   ISTATAPCO2  --   38.4*   ISTATAPO2  --  61*   ISTATATCO2  --  33   DSAEBHU2ENM  --  93   ISTATARTHCO3  --  32.0*   ISTATARTBE  --  9*   ISTATTEMP 36.7 C 37.1 C   ISTATFIO2 30 40   ISTATSPEC Venous Arterial   ISTATAPHTC  --  7.528*   LCWPRLSH7TD  --  61*       Fluids:  Intake/Output       08/08/20 0700 - 08/09/20 0659 08/09/20 0700 - 08/10/20 0659 08/10/20 0700 - 08/11/20 0659      0700-1859 1900-0659 Total 0700-1859 1900-0659 Total 0700-1859 1900-0659 Total       Intake    I.V.  533.3  302.1 835.4  147.1  139.9 287  116  -- 116    Precedex Volume 171.4 98.1 269.5 19 39.9 58.9 16 -- 16    Amiodarone Volume 361.9 204 565.9 128.1 -- 128.1 -- -- --    Norepinephrine Volume 0 -- 0 -- -- -- -- -- --    Volume (mL) (potassium chloride in water (KCL) ivpb **Administer in ICU only** 40 mEq) -- -- -- -- 100 100 100 -- 100    Other  250  -- 250  --  -- --  --  -- --    Medications (PO/Enteral Liquids) 250 -- 250 -- -- -- -- -- --    NG/GT  0  -- 0  100  300 400  200  -- 200    Intake (mL) (Enteral Tube 08/09/20 Cortrak - Gastric Left nare) -- -- -- 100 300 400 200 -- 200    Intake (mL) ([REMOVED] Enteral Tube 08/03/20 Cortrak - Gastric 10 Fr. Oral) 0 -- 0 -- -- -- -- -- --    IV Piggyback  200  100 300  100  -- 100  --  -- --    Volume (mL) (ampicillin/sulbactam (UNASYN) 3 g in  mL IVPB) 200 100 300 100 -- 100 -- -- --    Enteral  --  -- --  60  660 720  270  -- 270    Free Water / Tube Flush -- -- -- 60 660 720 270 -- 270    Total Intake 983.3 402.1 1385.4 407.1 1099.9 1507 586 -- 586       Output    Urine  750  750 1500  910  640 1550  --  -- --    Output (mL) (Urethral Catheter Temperature probe)   -- -- --    Drains  --  -- --  0  -- 0  --  -- --    Residual Amount (mL) (Discarded) (Enteral Tube 08/09/20 Cortrak - Gastric Left nare) -- -- -- 0 -- 0 -- -- --    Stool  --  -- --  --  -- --  --  -- --    Number of Times Stooled 1 x 3 x 4 x 0 x 1 x 1 x -- -- --    Emesis/NG output  --  -- --  0  -- 0  --  -- --     Output (mL) (Enteral Tube 20 Cortrak - Gastric Left nare) -- -- -- 0 -- 0 -- -- --    Total Output   -- -- --       Net I/O     233.3 -348 -114.6 -502.9 459.9 -43 586 -- 586        Weight: 70.9 kg (156 lb 4.9 oz)  Recent Labs     08/08/20  0515 08/09/20  0350 08/10/20  043   SODIUM 152* 153* 157*   POTASSIUM 3.7 4.0 3.1*   CHLORIDE 106 108 113*   CO2 33 34* 33   BUN 38* 41* 47*   CREATININE 1.02 1.17 1.14   PHOSPHORUS 6.6*  --   --    CALCIUM 7.7* 7.7* 8.1*     Body mass index is 26.83 kg/m².    GI/Nutrition:  Recent Labs     08/08/20  0515 08/09/20  0350 08/10/20  0430 08/10/20  1139   ALTSGPT  --   --  48  --    ASTSGOT  --   --  53*  --    ALKPHOSPHAT  --   --  72  --    TBILIRUBIN  --   --  0.6  --    PREALBUMIN  --   --   --  8.9*   GLUCOSE 126* 107* 120*  --        Heme:  Recent Labs     08/08/20  0515 08/09/20  0350 08/10/20  0430   RBC 3.38* 3.40* 3.45*   HEMOGLOBIN 10.5* 10.5* 10.9*   HEMATOCRIT 32.3* 33.0* 33.4*   PLATELETCT 237 278 359       Infectious Disease:  Monitored Temp 2  Av.9 °C (98.5 °F)  Min: 36.1 °C (97 °F)  Max: 37.6 °C (99.7 °F)  Temp  Av.5 °C (97.7 °F)  Min: 36.3 °C (97.3 °F)  Max: 36.7 °C (98.1 °F)  Recent Labs     08/08/20  0515 08/09/20  0350 08/10/20  0430   WBC 15.0* 12.4* 16.6*   NEUTSPOLYS 83.00* 92.10* 85.90*   LYMPHOCYTES 2.90* 0.90* 4.70*   MONOCYTES 12.50 3.50 8.10   EOSINOPHILS 0.60 0.00 0.10   BASOPHILS 0.30 0.00 0.20   ASTSGOT  --   --  53*   ALTSGPT  --   --  48   ALKPHOSPHAT  --   --  72   TBILIRUBIN  --   --  0.6       Medications:  • Metoprolol Tartrate  5 mg     • Pharmacy  1 Each     • apixaban  5 mg     • aspirin  81 mg     • clopidogrel  75 mg     • metoprolol  25 mg     • amiodarone  400 mg     • budesonide-formoterol  2 Puff     • racepinephrine  0.5 mL     • ipratropium-albuterol  3 mL     • atorvastatin  40 mg     • acetaminophen  650 mg     • Respiratory Therapy Consult       • ipratropium-albuterol  3 mL     • MD  Alert...Adult ICU Electrolyte Replacement per Pharmacy       • senna-docusate  2 Tab      And   • polyethylene glycol/lytes  1 Packet      And   • magnesium hydroxide  30 mL      And   • bisacodyl  10 mg     • labetalol  10 mg     • dexmedetomidine (PRECEDEX) infusion  0-1.5 mcg/kg/hr Stopped (08/10/20 0830)        Imaging:  IR-MIDLINE CATHETER INSERTION WO GUIDANCE > AGE 3   Final Result                  Ultrasound-guided midline placement performed by qualified nursing staff    as above.          DX-ABDOMEN FOR TUBE PLACEMENT   Final Result      Enteric tube projects over the stomach.      Distended loops of bowel throughout the abdomen may represent ileus.         DX-CHEST-FOR LINE PLACEMENT Perform procedure in: Patient's Room   Final Result      Right central line has been removed. No pneumothorax is seen.      Bilateral airspace opacities likely representing multifocal pneumonia.      Multifocal areas of parenchymal scarring are again noted. Underlying emphysematous changes.      Likely small bilateral pleural effusions.         DX-CHEST-PORTABLE (1 VIEW)   Final Result      Possible slight increase in hazy opacities in left lung base which could represent atelectasis and/or pneumonitis.      Otherwise no significant interval change.         DX-ABDOMEN FOR TUBE PLACEMENT   Final Result      Enteric tube has been placed and the tip projects over the stomach.      DX-CHEST-PORTABLE (1 VIEW)   Final Result      Removal of endotracheal and Dobbhoff tubes.      No other significant interval change.      DX-CHEST-PORTABLE (1 VIEW)   Final Result         1.  Interstitial pulmonary parenchymal prominence suggest chronic underlying lung disease, component of interstitial edema and/or infiltrates not excluded. Stable since prior study   2.  Small bilateral pleural effusions, stable since prior study   3.  Hyperexpansion of lungs favors changes of COPD.   4.  Atherosclerosis                  DX-CHEST-PORTABLE (1 VIEW)    Final Result         1.  Interstitial pulmonary parenchymal prominence suggest chronic underlying lung disease, component of interstitial edema and/or infiltrates not excluded. Stable since prior study   2.  Small bilateral pleural effusions, stable since prior study   3.  Hyperexpansion of lungs favors changes of COPD.   4.  Atherosclerosis               DX-CHEST-PORTABLE (1 VIEW)   Final Result         1.  Interstitial pulmonary parenchymal prominence suggest chronic underlying lung disease, component of interstitial edema and/or infiltrates not excluded. Stable since prior study   2.  Small bilateral pleural effusions, stable since prior study   3.  Hyperexpansion of lungs favors changes of COPD.   4.  Atherosclerosis            DX-CHEST-PORTABLE (1 VIEW)   Final Result         1.  Interstitial pulmonary parenchymal prominence suggest chronic underlying lung disease, component of interstitial edema and/or infiltrates not excluded. Stable since prior study   2.  Small bilateral pleural effusions, stable since prior study   3.  Hyperexpansion of lungs favors changes of COPD.   4.  Atherosclerosis         DX-CHEST-PORTABLE (1 VIEW)   Final Result         1.  Interstitial pulmonary parenchymal prominence suggest chronic underlying lung disease, component of interstitial edema and/or infiltrates not excluded. Stable since prior study   2.  Small bilateral pleural effusions, decreased on the right since prior study   3.  Hyperexpansion of lungs favors changes of COPD.   4.  Atherosclerosis      DX-ABDOMEN FOR TUBE PLACEMENT   Final Result      Feeding tube tip at the distal stomach.      DX-CHEST-LIMITED (1 VIEW)   Final Result         1.  Interstitial pulmonary parenchymal prominence suggest chronic underlying lung disease, component of interstitial edema and/or infiltrates not excluded. Stable since prior study   2.  Left upper lobe nodular density, see dedicated CT performed August 2, 2020 for further  characterization.   3.  Small bilateral pleural effusions   4.  Hyperexpansion of lungs favors changes of COPD.   5.  Atherosclerosis   6.  Right internal jugular central line is seen, there is no new central line appreciated since prior study.      DX-ABDOMEN FOR TUBE PLACEMENT   Final Result         1.  Nonspecific bowel gas pattern.   2.  Nasogastric tube tip terminates overlying the expected location of the pylorus or first duodenal segment.      DX-CHEST-PORTABLE (1 VIEW)   Final Result         1.  Interstitial pulmonary parenchymal prominence suggest chronic underlying lung disease, component of interstitial edema and/or infiltrates not excluded.   2.  Left upper lobe nodular density, see dedicated CT performed today for further characterization.   3.  Small bilateral pleural effusions   4.  Hyperexpansion of lungs favors changes of COPD.   5.  Atherosclerosis      EC-ECHOCARDIOGRAM COMPLETE W/O CONT   Final Result      CT-CHEST (THORAX) WITH   Final Result      1.  Diffuse bullous emphysematous changes of the chest with 3 areas of spiculation, 2 in the left upper lobe, and one in the right lower lobe.      2.  Differential diagnosis includes lung carcinoma versus acute and chronic inflammatory changes associated with bullous disease.      3.  Comparison with prior imaging, if available, is recommended. Alternatively PET/CT or biopsy may be of value to exclude lung carcinoma.      4.  No thoracic adenopathy.      5.  Bibasilar areas of atelectasis or pneumonia.      6.  No significant pleural effusion.      7.  No evidence of acute hemorrhage identified.      Fleischner Society pulmonary nodule recommendations:         CT-HEAD W/O   Final Result      1.  No acute intracranial process.      2.  Atrophy and small vessel ischemic change.      CL-LEFT HEART CATHETERIZATION WITH POSSIBLE INTERVENTION    (Results Pending)         Assessment and plan    * Acute on chronic systolic heart failure (HCC)- (present on  admission)  Assessment & Plan  - ischemic cardiomyopathy, EF 35%, left heart cath with KEYLA to RCA for STEMI, cardiac arrest  Plan  - ASA, Plavix, statin, will start BB, ACEi, Aldactone as able, holding for current need for pressors  - Lasix discontinued for hypernatremia    Coronary artery disease due to calcified coronary lesion- (present on admission)  Assessment & Plan  - multivessel disease - 100% RCA occlusion, 100% LCx occlusion, left main 70% stenosis, LAD 30%  - S/P KEYLA to RCA, possible reperfusion injury manifested by PEA, attained ROSC  Plan  - Cardiology following  - ASA, Plavix, statin, Metoprolol  - to start ACEi as able    Cardiac arrest (HCC)- (present on admission)  Assessment & Plan  - underlying ischemic heart disease, multivessel disease, initially received tenecteplase at outside facility, had Vfib, attained ROSC with 1 round of ACLS  - subsequently had PEA after cath at [facility] 8/3, attained ROSC with Epi and one round of ACLS  Plan  - Amiodarone to continue per Cardiology  - watch for post cath/ MI complications with continuous telemetry monitoring    Acute ST elevation myocardial infarction (STEMI) due to occlusion of right coronary artery (HCC)- (present on admission)  Assessment & Plan  - RCA STEMI < 100% occlusion on cath on admission  - S/P KEYLA to RCA, EF 35%  - currently on pressor support but off pressors from 8/7  Plan  - continue ASA, Plavix, statin per Cortrak  - metoprolol 25 bid  - Cardiology following, appreciate recs    PAF (paroxysmal atrial fibrillation) ([facility])  Assessment & Plan  - likely from increased arrhythmogenic myocardium post STEMI, cardiac arrest  - PRN Metoprolol IV for RVR  - scheduled PO Metoprolol  - CHADSVASC 3, on Eliquis    COPD (chronic obstructive pulmonary disease) ([facility])- (present on admission)  Assessment & Plan  - underlying COPD, unclear if he is on supplemental O2 support at home  - extubated successfully on 8/8 initially to BiPAP, currently saturating well  on Oxymask at 40%  - Symbicort, Duonebs, RT per protocol    Leukocytosis- (present on admission)  Assessment & Plan  - likely stress reaction, patient also hypothermic but this could be from hypoperfusion from cardiac arrest  - trending down to normal range, now normothermic  Plan  - Unasyn to cover for aspiration PNA, complete course    Ischemic cardiomyopathy (HCC)- (present on admission)  Assessment & Plan  - EF 35% at the time of cardiac catheterization during admission  - currently on ASA, Plavix, statin, Metoprolol    History of lung cancer  Assessment & Plan  - in 2005, S/P chemo, recent hemoptysis and friable mass on bronch raising suspicion for recurrence  - repeat bronchoscopy 8/4 with no evidence of endobronchial lesion/ mass, evident only old blood lavaged and sent for studies  - unlikely recurrence of lung cancer  - path from bronchoscopic tissue sample benign    Encephalopathy acute- (present on admission)  Assessment & Plan  - likely toxic, metabolic, possibly hypoxic/ anoxic during Vfib/ PEA but he was alert and able to communicate this AM  - resolved, alert, oriented, able to follow commands    Metabolic acidosis- (present on admission)  Assessment & Plan  resolved    Hyperglycemia- (present on admission)  Assessment & Plan  - BG<200, SSI      DISPO: ICU    Code Status: DNR    Quality Measures:  Cesar Catheter: yes  DVT Prophylaxis: Eliquis  Ulcer Prophylaxis: Pepcid  Antibiotics: Unasyn  Lines: PIV lines

## 2020-08-10 NOTE — FLOWSHEET NOTE
08/10/20 0204   Events/Summary/Plan   Events/Summary/Plan pts breathing increasingly worse; unable to tolerate BIPAP for any length of time; RN notified MD   Oxygen   O2 (LPM) 60   FiO2% 40 %   O2 Delivery Device Heated High Flow Nasal Cannula   Aerosols   $ Aerosol Delivery Device Heated High Flow Nasal Cannula   Aerosol Temperature 31 °C (87.8 °F)

## 2020-08-10 NOTE — PROGRESS NOTES
At 0145 Notified Dr. Reynoso of patient's increasing work of breathing and overall poor clinical picture. Blood gas results reviewed. Patient had just come off bipap, and stated it caused more difficulty breathing. Patient has frequent cough and is unable to clear secretions with mask on. Attempted use of sedative medications to help patient tolerate bipap after clearing secretions. Attempts were unsuccessful and patient was transitioned back to [facility]. Morning chest xray was performed.    0240 Updated Dr. Reynoso of patient's discussion with his wife about the desire to not be re-intubated if respiratory status were to continue to decline. Dr. Reynoso coming to bedside to discuss code status with patient.

## 2020-08-10 NOTE — PROGRESS NOTES
"Critical Care Progress Note    Date of admission  8/2/2020    Chief Complaint  74 y.o. male admitted 8/2/2020 with cardiac arrest, STEMI, resp failure    Hospital Course    74 y.o. male with a past medical history of hypercholesterolemia, hypothyroidism, COPD, lung cancer status post chemotherapy in 2005 who presented 8/2/2020 as a transfer from [facility] where he presented for sudden onset of retrosternal crushing chest pain which was rated at that time as 8 out of 10 and radiating to the left arm.  He was found to have an inferior ST elevation myocardial infarction and received tenecteplase for treatment.  He subsequently had a reperfusion ventricular fibrillation which required defibrillation and 1 round of ACLS.  He was intubated with RSI at 1615, following this intubation hemoptysis was noted and was reported to the transport team as \"a liter and a half of serosanguinous fluid\".  He was given TXA and Kcentra, for attempted reversal of TNKase, it is unknown if the outside facility has cryoprecipitate.  He was started on amiodarone and propofol and transferred to our facility for higher level of care.  At [facility] had PCI and KEYLA to RCA with residual  LCX; EF 35%.    8/3 - brief PEA arrest ~ 1 am, received additional crystalloid and PRBCs given protamine for heparin reversal, inhaled TXA.  Hemoglobin had dropped to 7.8 and received 2U PRBCs, full ventilator support, titrating down epinephrine and norepinephrine infusions, arouses and follows commands.  WBC 28.7, suspect stress reaction but Unasyn initiated for possible pneumonia/aspiration.   8/4 - some ongoing bloody secretions from ET tube; repeat bronchoscopy with old blood and airways lavaged without evidence of active bleeding, BAL sent for culture.  On/off norepinephrine infusion, off epinephrine.  Stop bicarbonate infusion today.  Not yet ready for extubation but awake and following commands.  Okay to stop amiodarone per cardiology  8/5 - SBT but not " appropriate for liberation, weaning sedation, advancing tube feed, low-dose norepinephrine, consider diuresis soon, remains on Unasyn with negative cultures to date.  Less bloody secretions in ET tube today status post bronchoscopy yesterday with no clear site for bleeding.  8/6 - path from airway mass 8/3 no malignant cells; starting stress dose steroid for ? PURI, add lasix as jayshree;   8/7 - full ventilator support, Lasix, not ready for extubation; no bowel movement, given Relistor today  8/8 - extubated to BiPAP and tolerating well, sodium high 152, Lasix stopped, Risperdal added for anxiety/agitation  8/9 - off BiPAP, currently on high flow nasal cannula.  Failed swallow and core track placed, start tube feed and free water      Interval Problem Update  Reviewed last 24 hour events:   - No acute events overnight   - Neuro: dex at 0.4   - HR: 60s-80s   - SBP: 100s-150s   - GI: pulled cortrak   - UOP: 1.6   - Cesar: yes   - Tm: 37.6   - Lines: CVC   - PPx: GI not indicated, DVT eliquis   - HFNC 40% and 60LPM   - CXR (personally reviewed and compared to prior): stable right effusion, LLL increasing opacity   - Off hydrocort   - Stop Risperdal    Yesterday  A/o x 4, off dex  Int AF; starting metoprolol   - 120  BiPAP last night; now 6 lpm oxy mask  TM = 99.3  I/O = 1.3/1.5 (-114, net +7L since admit)  2+ edema  + BM  WBC 12.  Na up 153  Lasix stopped; add free water  Cr 0.91 -> 1.02 -> 1.17  Unasyn day day 7 of 10  Eliquis, IV amio, DAPT, ASA to stop soon  Risperdal day 2 for agitated delirium  Solucortef for PURI, off norepi; taper off  PT/OT/SLP  Mobilize today    Review of Systems  Review of Systems   Unable to perform ROS: Acuity of condition        Vital Signs for last 24 hours   Pulse:  [] 72  Resp:  [17-30] 18  BP: (100-187)/(52-91) 138/75  SpO2:  [93 %-100 %] 99 %    Hemodynamic parameters for last 24 hours       Respiratory Information for the last 24 hours       Physical Exam   Physical  Exam  Vitals signs and nursing note reviewed.   Constitutional:       Appearance: He is well-developed. He is ill-appearing.      Interventions: He is intubated.   HENT:      Head: Normocephalic and atraumatic.      Right Ear: External ear normal.      Left Ear: External ear normal.      Nose: Nose normal.      Mouth/Throat:      Mouth: Mucous membranes are moist.   Eyes:      Conjunctiva/sclera: Conjunctivae normal.   Neck:      Musculoskeletal: Neck supple.      Vascular: No JVD.      Trachea: No tracheal deviation.   Cardiovascular:      Rate and Rhythm: Normal rate and regular rhythm.      Pulses: Normal pulses.      Comments: Intermittent atrial fibrillation  Pulmonary:      Effort: He is intubated.      Breath sounds: Rales present. No wheezing.      Comments: High flow nasal cannula today  Chest:      Comments: Barrel chested  Abdominal:      General: Bowel sounds are normal. There is no distension.      Palpations: Abdomen is soft.   Musculoskeletal:         General: No tenderness.   Skin:     General: Skin is dry.      Capillary Refill: Capillary refill takes 2 to 3 seconds.      Findings: No rash.      Comments: Slight clubbing; Multiple contusions to bilateral forearms   Neurological:      Comments: A/O x3, follows commands, odd affect, moves all extremities symmetrically   Psychiatric:      Comments: Unable to assess         Medications  Current Facility-Administered Medications   Medication Dose Route Frequency Provider Last Rate Last Dose   • potassium chloride in water (KCL) ivpb **Administer in ICU only** 40 mEq  40 mEq Intravenous Once Daniel Reynoso M.D. 50 mL/hr at 08/10/20 0542 40 mEq at 08/10/20 0542   • Metoprolol Tartrate (LOPRESSOR) injection 5 mg  5 mg Intravenous Q5 MIN PRN Igor Aldana M.D.       • Pharmacy Consult: Enteral tube insertion - review meds/change route/product selection  1 Each Other PHARMACY TO DOSE Eduardo Gaitan M.D.       • apixaban (ELIQUIS) tablet 5 mg  5 mg  Enteral Tube BID Eduardo Gaitan M.D.   5 mg at 08/10/20 0458   • aspirin (ASA) chewable tab 81 mg  81 mg Enteral Tube DAILY Eduardo Gaitan M.D.   81 mg at 08/10/20 0459   • clopidogrel (PLAVIX) tablet 75 mg  75 mg Enteral Tube DAILY Eduardo Gaitan M.D.   75 mg at 08/10/20 0459   • metoprolol (LOPRESSOR) tablet 25 mg  25 mg Enteral Tube TWICE DAILY Eduardo Gaitan M.D.   25 mg at 08/10/20 0459   • amiodarone (CORDARONE) tablet 400 mg  400 mg Enteral Tube Q DAY Kristyn Jon M.D.   400 mg at 08/10/20 0459   • risperiDONE (RISPERDAL) tablet 1 mg  1 mg Enteral Tube BID Eduardo Gaitan M.D.   1 mg at 08/10/20 0459   • budesonide-formoterol (SYMBICORT) 80-4.5 MCG/ACT inhaler 2 Puff  2 Puff Inhalation BID (RT) Eduardo Gaitan M.D.   Stopped at 08/08/20 2000   • racepinephrine (MICRONEFRIN) 2.25 % nebulizer solution 0.5 mL  0.5 mL Nebulization Q30MIN PRN (RT) Eduardo Gaitan M.D.       • ipratropium-albuterol (DUONEB) nebulizer solution  3 mL Nebulization Q4HRS (RT) Harsha Chavis M.D.   3 mL at 08/10/20 0642   • atorvastatin (LIPITOR) tablet 40 mg  40 mg Enteral Tube Q EVENING Eduardo Gaitan M.D.   40 mg at 08/09/20 1650   • acetaminophen (TYLENOL) tablet 650 mg  650 mg Enteral Tube Q6HRS PRN Eduardo Gaitan M.D.       • Respiratory Therapy Consult   Nebulization Continuous RT LAURA Evans Jr..O.       • ipratropium-albuterol (DUONEB) nebulizer solution  3 mL Nebulization Q2HRS PRN (RT) LAURA Evans Jr..OSalma   3 mL at 08/05/20 0240   • MD Alert...ICU Electrolyte Replacement per Pharmacy   Other PHARMACY TO DOSE LAURA Evans Jr..O.       • senna-docusate (PERICOLACE or SENOKOT S) 8.6-50 MG per tablet 2 Tab  2 Tab Enteral Tube BID Poncho Sy Jr., LAURA.O.   Stopped at 08/08/20 1800    And   • polyethylene glycol/lytes (MIRALAX) PACKET 1 Packet  1 Packet Enteral Tube QDAY PRN LAURA Evans Jr..O.   1 Packet at 08/07/20 1015    And   • magnesium hydroxide (MILK OF MAGNESIA) suspension 30 mL   30 mL Enteral Tube QDAY PRN Poncho Sy Jr., D.O.   30 mL at 08/05/20 1732    And   • bisacodyl (DULCOLAX) suppository 10 mg  10 mg Rectal QDAY PRN Poncho Sy Jr., D.O.   10 mg at 08/06/20 1735   • labetalol (NORMODYNE/TRANDATE) injection 10 mg  10 mg Intravenous Q4HRS PRN Poncho Sy Jr., D.O.   10 mg at 08/10/20 0110   • dexmedetomidine (PRECEDEX) 400 mcg/100mL NS premix infusion  0-1.5 mcg/kg/hr Intravenous Continuous Poncho Sy Jr., D.O. 6.4 mL/hr at 08/10/20 0015 0.4 mcg/kg/hr at 08/10/20 0015       Fluids    Intake/Output Summary (Last 24 hours) at 8/10/2020 0646  Last data filed at 8/10/2020 0600  Gross per 24 hour   Intake 1506.99 ml   Output 1550 ml   Net -43.01 ml       Laboratory  Recent Labs     08/08/20  0500 08/10/20  0119   ISTATAPH  --  7.530*   ISTATAPCO2  --  38.4*   ISTATAPO2  --  61*   ISTATATCO2  --  33   HHJMJTF5AYD  --  93   ISTATARTHCO3  --  32.0*   ISTATARTBE  --  9*   ISTATTEMP 36.7 C 37.1 C   ISTATFIO2 30 40   ISTATSPEC Venous Arterial   ISTATAPHTC  --  7.528*   [id number]  --  61*         Recent Labs     08/08/20  0515 08/09/20  0350 08/10/20  0430   SODIUM 152* 153* 157*   POTASSIUM 3.7 4.0 3.1*   CHLORIDE 106 108 113*   CO2 33 34* 33   BUN 38* 41* 47*   CREATININE 1.02 1.17 1.14   PHOSPHORUS 6.6*  --   --    CALCIUM 7.7* 7.7* 8.1*     Recent Labs     08/08/20  0515 08/09/20  0350 08/10/20  0430   ALTSGPT  --   --  48   ASTSGOT  --   --  53*   ALKPHOSPHAT  --   --  72   TBILIRUBIN  --   --  0.6   GLUCOSE 126* 107* 120*     Recent Labs     08/08/20  0515 08/09/20  0350 08/10/20  0430   WBC 15.0* 12.4* 16.6*   NEUTSPOLYS 83.00* 92.10* 85.90*   LYMPHOCYTES 2.90* 0.90* 4.70*   MONOCYTES 12.50 3.50 8.10   EOSINOPHILS 0.60 0.00 0.10   BASOPHILS 0.30 0.00 0.20   ASTSGOT  --   --  53*   ALTSGPT  --   --  48   ALKPHOSPHAT  --   --  72   TBILIRUBIN  --   --  0.6     Recent Labs     08/08/20  0515 08/09/20  0350 08/10/20  0430   RBC 3.38* 3.40* 3.45*   HEMOGLOBIN 10.5* 10.5*  10.9*   HEMATOCRIT 32.3* 33.0* 33.4*   PLATELETCT 237 278 359       Imaging  X-Ray:  I have personally reviewed the images and compared with prior images.    Assessment/Plan  * Acute on chronic systolic heart failure (HCC)- (present on admission)  Assessment & Plan  Intubated after cardiac arrest in the setting of NSTEMI with hemoptysis after intubation - 8/2  Liberated from ventilator 8/8  Continue titrated oxygen, high flow nasal cannula, increase mobility.  PRN lasix    Cardiac arrest ([facility])- (present on admission)  Assessment & Plan  Reportedly V. fib arrest at outside hospital, ? Reperfusion following TNKase  Continue amiodarone PO  Monitor for recurrent arrhythmias, optimize electrolytes    Acute ST elevation myocardial infarction (STEMI) due to occlusion of right coronary artery ([facility])- (present on admission)  Assessment & Plan  Taken to Cath Lab on 8/2/2020, KEYLA placed in RCA  Cardiology following  DAPT  Reduced EF 35%  Meds reviewed, cardiology following    PAF (paroxysmal atrial fibrillation) ([facility])  Assessment & Plan  Rate control, started on metoprolol, anticoagulation with Eliquis now    COPD (chronic obstructive pulmonary disease) ([facility])- (present on admission)  Assessment & Plan  Not in acute exacerbation  RT/O2 Protocols  Titrate supplemental FiO2 to maintain SpO2 >92%    Leukocytosis- (present on admission)  Assessment & Plan  Unasyn completed  Cultures negative    Ischemic cardiomyopathy ([facility])- (present on admission)  Assessment & Plan  Left ventricular ejection fraction of 35%  Careful volume resuscitation  Will need beta-blocker, ACE inhibitor and diuresis as tolerated    History of lung cancer  Assessment & Plan  Reportedly treated in 2005  3 spiculated masses present on CT chest  Endobronchial mobile tissue mass benign bronchial mucosa, suspect traumatic intubation with bleeding post T and K  Repeat bronchoscopy without any airway lesions noted  Follow-up CT scan 1 to 2 months after  recovers    Acute respiratory failure with hypoxia (HCC)- (present on admission)  Assessment & Plan  Weaned from ventilator  RT/O2 Protocols  HFNC titrations ongoing  Titrate supplemental FiO2 to maintain SpO2 >92%  Ambulate, IS    Encephalopathy acute- (present on admission)  Assessment & Plan  Limit sedatives  GCS 14    Hyperglycemia- (present on admission)  Assessment & Plan  Goal blood glucose 120-180  sliding scale insulin, accuchecks  hypoglycemia protocol      VTE:  Contraindicated  Ulcer: H2 Antagonist  Lines: Central Line  Ongoing indication addressed    I have performed a physical exam and reviewed and updated ROS and Plan today (8/10/2020). In review of yesterday's note (8/9/2020), there are no changes except as documented above.     Ongoing titration of HFNC, at high risk of worsen leading to death. This patient is critically ill, at high risk for decompensation leading to worsening vital organ dysfunction and death without critical care interventions.    Discussed patient condition and risk of morbidity and/or mortality with RN, RT, Pharmacy, [employer] resident, Charge nurse / hot rounds and Patient     The patient remains critically ill.  Critical care time = 31 minutes in directly providing and coordinating critical care and extensive data review.  No time overlap and excludes procedures

## 2020-08-10 NOTE — PROGRESS NOTES
Lab called with critical result of sodium of 157 at 0511. Critical lab result read back to Haley BALL.   Dr. Reynoso notified of critical lab result at 0534 .  Critical lab result read back by Dr. Reynoso. Orders for free water flushes and Q6 sodiums reordered.     Notified of potassium of 3.1 and replacements ordered.

## 2020-08-10 NOTE — PROGRESS NOTES
Cortrak Placement    Tube Team verified patient name and medical record number prior to tube placement.  Cortrak tube (43 inches, 10 Cuban) placed at 62 cm in left nare.  Per Cortrak picture, tube appears to be in the stomach.  Nursing Instructions: Awaiting KUB to confirm placement before use for medications or feeding. Once placement confirmed, flush tube with 30 ml of water, and then remove and save stylet, in patient medication drawer.

## 2020-08-10 NOTE — PROGRESS NOTES
"Received a page from RN at around 3:00 AM regarding CODE STATUS change.talked to the patient at the bed side and he states clearly that he doesn't want any \"chest compression or breathing tube down his throat\" , patient has had a discussion with his wife through phone and they were in agreement.patient code status changed to DNR/DNI.  "

## 2020-08-10 NOTE — DIETARY
Nutrition support weekly update:  Day 8 of admit.  Sukhi Howard is a 74 y.o. male with admitting DX of STEMI.  Tube feeding started on 8/3. Current TF via Cortrak NGT running with IMpact Peptide 1.5 @ 25 ml/hr. Per RN, pt pulled NGT and just replaced today.     Assessment:  Weight 70.9 kg, unsure how wt was obtained. Last measured wt was 73.1 kg per bedscale on 8/9. Will continue to use wt of 71 kg for calculations.     Re-estimated needs now that pt off Vent:  MSJ X 1.2= 1634;   Kcals/day: 9262-6414 kcals/day (23-26 kcal/kg)  G pro/day: ~ 107 g pro/day (1.5+ g pro/day )  Free Water/day: 2237-0223 ml/day (25-30 ml/hr)      Evaluation:   1. Pt failed SLP evaluatoin on 8/9, s/p extubation on 8/8.   2. Per KUB on 8/9: Enteric tube projects over the stomach. Distended loops of bowel throughout the abdomen may represent ileus.  3. Per RN notes, pt had distended abd/bloody stools yesterday. RN states no distended abd/bloody stools today. Reviewed with MD, MD okay to resume TF and advance per TF protocol.   4. Pertinent Labs: Na+ 157, K+ 3.1, BUN 47, Phos 6.6 on 8/8.   5. Per notes, pt extubated on 8/8. Per respiratory chart today: pts breathing increasingly worse. Pt currently on High Flow Nasal Cannula.   6. GI: + BM on 8/9.   7. Free water: 150 ml every four hours (ordered today). TF at goal + flushes will provide 1824 ml free water/day (~26 ml/kg).   8. Peptide Based TF formula such as Impact Peptide 1.5 remains appropriate to optimize tolerance.       Recommendations/Plan:  1. Continue Impact Peptide 1.5 @ 25 ml/hr and advance per TF protocol/pt tolerance to goal rate of 50 ml/hr. This will provide 1800 kcals/day, 113 g pro/day, 1200 mg  Phos, and 924 ml free water/day.    2. Fluids per MD.  3. Provide new phos labs, RN said she would address this with MD. Will monitor phos and adjust TF as needed.   4. RD following.

## 2020-08-10 NOTE — PROCEDURES
Vascular Access Team    Date of Insertion: 8/10/20  Arm Circumference: n/a  Line Length: 8  Line Size: 20  Vein Occupancy %: 38  Reason for Midline: Access  Labs: WBC 16.6, , BUN 47, Cr 1.14, GFR >60, INR NA    Orders confirmed, vessel patency confirmed with ultrasound. Risks and benefits of procedure explained to patient and education regarding line associated bloodstream infections provided. Questions answered.     PowerGlide Midline placed in LUE per licensed provider order with ultrasound guidance. 20g, 8 cm line placed in Bacilic vein after 1 attempt(s).  Catheter inserted with brisk blood return. Secured with 0 cm external from insertion site.  Line flushed without resistance with 10 mL 0.9% normal saline.  Midline secured with Biopatch and Tegaderm.     Midline placement is confirmed by nurse using ultrasound and ability to flush and draw blood. Midline is appropriate for use at this time.  No X-ray is needed for placement confirmation. Pt tolerated procedure well.  Patient condition relayed to unit RN or ordering physician via this post procedure note in the EMR.    Ultrasound images uploaded to PACS and viewable in the EMR - yes  Ultrasound imaged printed and placed in paper chart - no      BARD PowerGlide Midline ref # [id number], Lot # [id number], Expiration Date 1/31/21

## 2020-08-10 NOTE — WOUND TEAM
Wound Team assessed patient's bilateral feet, heels, ankles. Feet are cool to cold, purple. DP pulses barely palpable. Darker purplish red marks to ankles and heels have diffuse speckled edges which indicate that these are a result of poor arterial blood flow due to the vasopressor medications that patient is on. Heel mepilex are on both heels, feet/heels well floated on pillows. Patient is restless. Placed skin care order. No other interventions needed from Wound Team at this time.

## 2020-08-10 NOTE — PROGRESS NOTES
Cardiology Follow Up Progress Note    Date of Service  8/10/2020    Attending Physician  Poncho Sy Jr., D.O.    Chief Complaint   STEMI, percutaneous intervention with stent placement, atrial fibrillation on chronic anticoagulation therapy, hemoptysis.    LONI Howard is a 74 y.o. male admitted 8/2/2020 with above.    Interim Events  No significant changes noted from cardiac standpoint within the past 24 hours.    Review of Systems  Review of Systems   Unable to perform ROS: Other       Vital signs in last 24 hours  Temp:  [36.3 °C (97.3 °F)] 36.3 °C (97.3 °F)  Pulse:  [] 74  Resp:  [17-31] 18  BP: (100-187)/(51-94) 116/51  SpO2:  [93 %-100 %] 99 %    Physical Exam  Physical Exam  Constitutional:       Interventions: He is sedated.   HENT:      Head: Normocephalic and atraumatic.   Eyes:      Comments: Closed.   Cardiovascular:      Rate and Rhythm: Normal rate and regular rhythm.   Pulmonary:      Breath sounds: Normal breath sounds.   Abdominal:      General: Bowel sounds are normal.      Palpations: Abdomen is soft.   Skin:     General: Skin is warm and dry.         Lab Review  Lab Results   Component Value Date/Time    WBC 16.6 (H) 08/10/2020 04:30 AM    RBC 3.45 (L) 08/10/2020 04:30 AM    HEMOGLOBIN 10.9 (L) 08/10/2020 04:30 AM    HEMATOCRIT 33.4 (L) 08/10/2020 04:30 AM    MCV 96.8 08/10/2020 04:30 AM    MCH 31.6 08/10/2020 04:30 AM    MCHC 32.6 (L) 08/10/2020 04:30 AM    MPV 10.4 08/10/2020 04:30 AM      Lab Results   Component Value Date/Time    SODIUM 157 (HH) 08/10/2020 04:30 AM    POTASSIUM 3.1 (L) 08/10/2020 04:30 AM    CHLORIDE 113 (H) 08/10/2020 04:30 AM    CO2 33 08/10/2020 04:30 AM    GLUCOSE 120 (H) 08/10/2020 04:30 AM    BUN 47 (H) 08/10/2020 04:30 AM    CREATININE 1.14 08/10/2020 04:30 AM      Lab Results   Component Value Date/Time    ASTSGOT 53 (H) 08/10/2020 04:30 AM    ALTSGPT 48 08/10/2020 04:30 AM     Lab Results   Component Value Date/Time    CHOLSTRLTOT 91 (L) 08/03/2020  12:25 AM    LDL 32 08/03/2020 12:25 AM    HDL 51 08/03/2020 12:25 AM    TRIGLYCERIDE 38 08/03/2020 12:25 AM    TROPONINT 3931 (H) 08/03/2020 12:25 AM       No results for input(s): NTPROBNP in the last 72 hours.  (Above labs reviewed.)       Current Facility-Administered Medications:   •  Metoprolol Tartrate (LOPRESSOR) injection 5 mg, 5 mg, Intravenous, Q5 MIN PRN, Igor Aldana M.D.  •  Pharmacy Consult: Enteral tube insertion - review meds/change route/product selection, 1 Each, Other, PHARMACY TO DOSE, Eduardo Gaitan M.D.  •  apixaban (ELIQUIS) tablet 5 mg, 5 mg, Enteral Tube, BID, Eduardo Gaitan M.D., 5 mg at 08/10/20 0458  •  aspirin (ASA) chewable tab 81 mg, 81 mg, Enteral Tube, DAILY, Eduardo Gaitan M.D., 81 mg at 08/10/20 0459  •  clopidogrel (PLAVIX) tablet 75 mg, 75 mg, Enteral Tube, DAILY, Eduardo Gaitan M.D., 75 mg at 08/10/20 0459  •  metoprolol (LOPRESSOR) tablet 25 mg, 25 mg, Enteral Tube, TWICE DAILY, Eduardo Gaitan M.D., 25 mg at 08/10/20 0459  •  amiodarone (CORDARONE) tablet 400 mg, 400 mg, Enteral Tube, Q DAY, Kristyn Jon M.D., 400 mg at 08/10/20 0459  •  budesonide-formoterol (SYMBICORT) 80-4.5 MCG/ACT inhaler 2 Puff, 2 Puff, Inhalation, BID (RT), Eduardo Gaitan M.D., Stopped at 08/08/20 2000  •  racepinephrine (MICRONEFRIN) 2.25 % nebulizer solution 0.5 mL, 0.5 mL, Nebulization, Q30MIN PRN (RT), Eduardo Gaitan M.D.  •  ipratropium-albuterol (DUONEB) nebulizer solution, 3 mL, Nebulization, Q4HRS (RT), Harsha Chavis M.D., 3 mL at 08/10/20 0642  •  atorvastatin (LIPITOR) tablet 40 mg, 40 mg, Enteral Tube, Q EVENING, Eduardo Gaitan M.D., 40 mg at 08/09/20 1650  •  acetaminophen (TYLENOL) tablet 650 mg, 650 mg, Enteral Tube, Q6HRS PRN, Eduardo Gaitan M.D.  •  Respiratory Therapy Consult, , Nebulization, Continuous RT, Poncho Sy Jr., D.O.  •  ipratropium-albuterol (DUONEB) nebulizer solution, 3 mL, Nebulization, Q2HRS PRN (RT), Poncho Sy Jr., D.O., 3 mL at 08/05/20  0240  •  MD Alert...ICU Electrolyte Replacement per Pharmacy, , Other, PHARMACY TO DOSE, Poncho Sy Jr., D.O.  •  senna-docusate (PERICOLACE or SENOKOT S) 8.6-50 MG per tablet 2 Tab, 2 Tab, Enteral Tube, BID, Stopped at 08/08/20 1800 **AND** polyethylene glycol/lytes (MIRALAX) PACKET 1 Packet, 1 Packet, Enteral Tube, QDAY PRN, 1 Packet at 08/07/20 1015 **AND** magnesium hydroxide (MILK OF MAGNESIA) suspension 30 mL, 30 mL, Enteral Tube, QDAY PRN, 30 mL at 08/05/20 1732 **AND** bisacodyl (DULCOLAX) suppository 10 mg, 10 mg, Rectal, QDAY PRN, Poncho Sy Jr., D.O., 10 mg at 08/06/20 1735  •  labetalol (NORMODYNE/TRANDATE) injection 10 mg, 10 mg, Intravenous, Q4HRS PRN, Poncho Sy Jr., D.O., 10 mg at 08/10/20 0110  •  [DISCONTINUED] fentaNYL (SUBLIMAZE) injection 100 mcg, 100 mcg, Intravenous, Q15 MIN PRN, 100 mcg at 08/08/20 0303 **AND** [DISCONTINUED] fentaNYL (SUBLIMAZE) injection 200 mcg, 200 mcg, Intravenous, Q15 MIN PRN, 200 mcg at 08/05/20 1356 **AND** [DISCONTINUED] fentaNYL (SUBLIMAZE) 50 mcg/mL in 50mL (Continuous Infusion), , Intravenous, Continuous, Stopped at 08/07/20 0944 **AND** dexmedetomidine (PRECEDEX) 400 mcg/100mL NS premix infusion, 0-1.5 mcg/kg/hr, Intravenous, Continuous, Poncho Sy Jr., D.O., Stopped at 08/10/20 0830  (Medications reviewed.)    Cardiac Imaging and Procedures Review  CARDIAC STUDIES/PROCEDURES:    CARDIAC CATHETERIZATION CONCLUSIONS by Arsenio Loredo (08/02/20)  1. Acute inferior STEMI  2. Successful PCI of the mid and distal RCA 92.5 x 15 mm Kyle drug-eluting stent   2.5 x 22 mm Kyle drug-eluting stent.)  3. Residual ischemia involving the left main coronary artery and circumflex  4. Severe elevation in LVEDP  (study result reviewed)    ECHOCARDIOGRAM CONCLUSIONS (08/02/20)  Intubated. Technically difficult. Only subcostals available.  Moderately reduced left ventricular systolic function. Left ventricular   ejection fraction is visually estimated to be  35%.  Left ventricular hypokinesis of the inferolateral, inferior and   inferoseptal walls.  The right ventricle was normal in size and function.  Mild tricuspid regurgitation. Estimated right ventricular systolic   pressure  is 55 mmHg; moderate pulmonary hypertension. Right atrial   pressure is estimated to be 8 mmHg.  Normal pericardium without effusion.  (study result reviewed)    EKG performed on (08/08/20) was reviewed: EKG personally interpreted shows sinus rhythm with anterior ST depression.    Assessment/Plan  1. STEMI, Coronary artery disease with stent placement: He is clinically doing well without recurrence of his angina.  We will continue with current medical care including aspirin, clopidogrel, metoprolol, lisinopril and atorvastatin.  2. Ischemic cardiomyopathy with congestive heart failure: The overall volume status is improving.  3. Atrial fibrillation on anticoagulation therapy (Eliquis): He is doing well on anticoagulation.        Thank you for allowing me to participate in the care of this patient.  I will continue to follow this patient    Please contact me with any questions.    Willie Snell M.D.   Cardiologist, [facility]  [phone]

## 2020-08-10 NOTE — THERAPY
Physical Therapy   Initial Evaluation     Patient Name: Sukhi Howard  Age:  74 y.o., Sex:  male  Medical Record #: [id number]  Today's Date: 8/10/2020     Precautions: Fall Risk, Nasogastric Tube, Swallow Precautions ( See Comments)    Assessment  Patient is 74 y.o. male diagnosed with STEMI s/p PCI with successful stent placement with hx of hypercholesterolemia, hypothyroidism, COPD, lung cancer status post chemotherapy in 2005. Pt presents to physical therapy with impairments in functional mobility, balance, gait, strength and activity tolerance due to prolonged hospitalization and previous intubation. Not appropriate to initiate Cardiac Rehab phase I education at this time. PT to follow while in house.     Plan    Recommend Physical Therapy 3 times per week until therapy goals are met for the following treatments:  Bed Mobility, Equipment, Gait Training, Neuro Re-Education / Balance, Self Care/Home Evaluation, Stair Training, Therapeutic Activities and Therapeutic Exercises    DC Equipment Recommendations: (P) Unable to determine at this time       08/10/20 1003   Precautions   Precautions Fall Risk;Nasogastric Tube;Swallow Precautions ( See Comments)   Comments HFNC   Vitals   O2 Delivery Device Heated High Flow Nasal Cannula   Prior Living Situation   Lives with - Patient's Self Care Capacity Spouse   Comments Pt reports he lives with his Spouse near [facility]   Prior Level of Functional Mobility   Bed Mobility Unable To Determine At This Time   Cognition    Level of Consciousness Alert   Comments cooperative, communication limited by O2 needs   Active ROM Lower Body    Active ROM Lower Body  X   Comments observed slight knee extension deficits L LE. Otherwise appears WLF for mobility   Strength Lower Body   Lower Body Strength  X   Comments generalized weakness due to immobility   Balance Assessment   Sitting Balance (Static) Poor   Sitting Balance (Dynamic) Poor -   Weight Shift Sitting Poor   Comments L  lateral lean. required posterior support while EOB   Gait Analysis   Gait Level Of Assist Unable to Participate   Bed Mobility    Supine to Sit Maximal Assist  (x2 people)   Sit to Supine Maximal Assist  (x2 people)   Scooting Maximal Assist   Functional Mobility   Sit to Stand Unable to Participate   Comments RN attempting to place cortrack while pt sitting EOB with therapy. Deferred standing at pt with bloody nose after failed attempt   Short Term Goals    Short Term Goal # 1 pt will perform supine <> sit with HOB flat, no railing and Min A in 6 visits   Short Term Goal # 2 pt will perform sit <> stand and functional transfers with LRAD and Min A to improve mobility independence in 6 visits   Short Term Goal # 3 pt will ambulate > 50 ft with LRAD and Mod A to improve activity tolerance in 6 visits   Short Term Goal # 4 pt will negotiate stairs as needed to access home environment with Min A in 6 visits

## 2020-08-10 NOTE — THERAPY
Occupational Therapy   Initial Evaluation     Patient Name: Sukhi Howard  Age:  74 y.o., Sex:  male  Medical Record #: [id number]  Today's Date: 8/10/2020     Precautions  Precautions: Fall Risk, Nasogastric Tube, Swallow Precautions ( See Comments)  Comments: HFNC    Assessment  Patient is 74 y.o. male with STEMI s/p PCI with successful stent placement with hx of hypercholesterolemia, hypothyroidism, COPD, lung cancer status post chemotherapy in 2005. Pt presents to OT eval with decreased activity tolerance, generalized weakness, poor trunk control, decreased independence with ADLs, and limited ability to complete ADL transfers and functional mobility. Pt will benefit from continued acute OT to address seated ADLs, tolerance in upright position, and seated balance.     Plan    Recommend Occupational Therapy 3 times per week until therapy goals are met for the following treatments:  Adaptive Equipment, Cognitive Skill Development, Manual Therapy Techniques, Neuro Re-Education / Balance, Self Care/Activities of Daily Living, Therapeutic Activities and Therapeutic Exercises.    DC Equipment Recommendations: Unable to determine at this time  Discharge Recommendations: Recommend post-acute placement for additional occupational therapy services prior to discharge home     Subjective    Pt alert and cooperative during OT eval.      Objective       08/10/20 1002   Prior Living Situation   Prior Services Unable To Determine At This Time   Lives with - Patient's Self Care Capacity Spouse   Comments Pt reports he lives with spouse near [place]. Unclear if spouse is able to assist upon d/c. Communication limited due to O2 needs.   Prior Level of ADL Function   Self Feeding Unable To Determine At This Time   Grooming / Hygiene Unable To Determine At This Time   Bathing Unable To Determine At This Time   Dressing Unable To Determine At This Time   Toileting Unable To Determine At This Time   Prior Level of IADL Function    Medication Management Unable To Determine At This Time   Laundry Unable To Determine At This Time   Kitchen Mobility Unable To Determine At This Time   Finances Unable To Determine At This Time   Home Management Unable To Determine At This Time   Shopping Unable To Determine At This Time   Prior Level Of Mobility Unable to Determine At This Time   Driving / Transportation Unable To Determine At This Time   Occupation (Pre-Hospital Vocational) Retired Due To Age   Leisure Interests Unable To Determine At This Time   Cognition    Level of Consciousness Alert   Comments Pleasant, cooperative   Strength Upper Body   Upper Body Strength  X   Gross Strength Generalized Weakness, Equal Bilaterally.    Neurological Concerns   Neurological Concerns Yes   Sitting Posture During ADL's Lateral Lean Left   Balance Assessment   Comments L lateral lean, required posterior support while EOB   Bed Mobility    Supine to Sit Maximal Assist  (x2)   ADL Assessment   Grooming Moderate Assist  (for washing face)   Lower Body Dressing Maximal Assist  (for socks)   Toileting   (Unable to get to [facility])   Functional Mobility   Toilet Transfers Unable to Participate   Mobility EOB only with maxAx2   Activity Tolerance   Comments limited by fatigue, weakness   Patient / Family Goals   Patient / Family Goal #1 To go home   Short Term Goals   Short Term Goal # 1 Pt will complete UE dressing with Ronaldo by discharge.   Short Term Goal # 2 Pt will complete seated grooming/hygiene unsupported at EOB with Ronaldo by discharge.

## 2020-08-10 NOTE — PROGRESS NOTES
12 hour chart check    Monitor summary:  Sinus rhythm with bouts of atrial fibrillation 70-90  .20/.08/.40

## 2020-08-10 NOTE — PROGRESS NOTES
Notified Dr. Reynoso of patient's bloody stool, abdominal pain, and increased work of breathing. No reports of bloody stool from dayshift nurse or documentation of bloody stool. Dr. Reynoso updated patient is agreeable to being placed on bipap. Patient has strength enough to independently remove bipap mask and is using the call light appropriately. Plan to continue to monitor abdominal pain and stool. Labs ordered for the morning and this morning's Hgb was 10.5.

## 2020-08-11 PROBLEM — E87.0 HYPERNATREMIA: Status: ACTIVE | Noted: 2020-01-01

## 2020-08-11 NOTE — PROGRESS NOTES
Cardiology Follow Up Progress Note    Date of Service  8/11/2020    Attending Physician  Poncho Sy Jr., D.O.    Chief Complaint   STEMI, percutaneous intervention with stent placement, atrial fibrillation on chronic anticoagulation therapy, hemoptysis.     LONI Howard is a 74 y.o. male admitted 8/2/2020 with above.    Interim Events  No significant changes noted from cardiac standpoint within the past 24 hours.    Review of Systems  Review of Systems   Unable to perform ROS: Other       Vital signs in last 24 hours  Temp:  [36.7 °C (98.1 °F)-36.8 °C (98.3 °F)] 36.8 °C (98.3 °F)  Pulse:  [] 89  Resp:  [19-34] 34  BP: ()/(51-81) 143/71  SpO2:  [91 %-100 %] 94 %    Physical Exam  Physical Exam  Constitutional:       Interventions: He is sedated. He is not intubated.  HENT:      Head: Normocephalic and atraumatic.   Eyes:      Comments: Closed.   Cardiovascular:      Rate and Rhythm: Normal rate and regular rhythm.   Pulmonary:      Effort: He is not intubated.      Breath sounds: Normal breath sounds.   Abdominal:      General: Bowel sounds are normal.      Palpations: Abdomen is soft.   Skin:     General: Skin is warm and dry.         Lab Review  Lab Results   Component Value Date/Time    WBC 19.9 (H) 08/11/2020 03:00 AM    RBC 3.24 (L) 08/11/2020 03:00 AM    HEMOGLOBIN 10.0 (L) 08/11/2020 03:00 AM    HEMATOCRIT 31.9 (L) 08/11/2020 03:00 AM    MCV 98.5 (H) 08/11/2020 03:00 AM    MCH 30.9 08/11/2020 03:00 AM    MCHC 31.3 (L) 08/11/2020 03:00 AM    MPV 10.6 08/11/2020 03:00 AM      Lab Results   Component Value Date/Time    SODIUM 159 (HH) 08/11/2020 03:00 AM    POTASSIUM 3.2 (L) 08/11/2020 03:00 AM    CHLORIDE 116 (H) 08/11/2020 03:00 AM    CO2 33 08/11/2020 03:00 AM    GLUCOSE 160 (H) 08/11/2020 03:00 AM    BUN 50 (H) 08/11/2020 03:00 AM    CREATININE 1.16 08/11/2020 03:00 AM      Lab Results   Component Value Date/Time    ASTSGOT 53 (H) 08/10/2020 04:30 AM    ALTSGPT 48 08/10/2020 04:30 AM      Lab Results   Component Value Date/Time    CHOLSTRLTOT 91 (L) 08/03/2020 12:25 AM    LDL 32 08/03/2020 12:25 AM    HDL 51 08/03/2020 12:25 AM    TRIGLYCERIDE 38 08/03/2020 12:25 AM    TROPONINT 3931 (H) 08/03/2020 12:25 AM       No results for input(s): NTPROBNP in the last 72 hours.  (Above labs reviewed.)       Current Facility-Administered Medications:   •  potassium bicarbonate (KLYTE) effervescent tablet 25 mEq, 25 mEq, Enteral Tube, BID, Stephen Fay M.D., 25 mEq at 08/11/20 0646  •  lisinopril (PRINIVIL) tablet 2.5 mg, 2.5 mg, Oral, Q DAY, Stephen Fay M.D., 2.5 mg at 08/11/20 0645  •  Metoprolol Tartrate (LOPRESSOR) injection 5 mg, 5 mg, Intravenous, Q5 MIN PRN, Igor Aldana M.D.  •  Pharmacy Consult: Enteral tube insertion - review meds/change route/product selection, 1 Each, Other, PHARMACY TO DOSE, Eduardo Gaitan M.D.  •  apixaban (ELIQUIS) tablet 5 mg, 5 mg, Enteral Tube, BID, Eduardo Gaitan M.D., 5 mg at 08/11/20 0936  •  aspirin (ASA) chewable tab 81 mg, 81 mg, Enteral Tube, DAILY, Eduardo Gaitan M.D., 81 mg at 08/11/20 0615  •  clopidogrel (PLAVIX) tablet 75 mg, 75 mg, Enteral Tube, DAILY, Eduardo Gaitan M.D., 75 mg at 08/11/20 0616  •  metoprolol (LOPRESSOR) tablet 25 mg, 25 mg, Enteral Tube, TWICE DAILY, Eduardo Gaitan M.D., 25 mg at 08/11/20 0615  •  amiodarone (CORDARONE) tablet 400 mg, 400 mg, Enteral Tube, Q DAY, Kristyn Jon M.D., 400 mg at 08/11/20 0615  •  budesonide-formoterol (SYMBICORT) 80-4.5 MCG/ACT inhaler 2 Puff, 2 Puff, Inhalation, BID (RT), Eduardo Gaitan M.D., 2 Puff at 08/11/20 0628  •  racepinephrine (MICRONEFRIN) 2.25 % nebulizer solution 0.5 mL, 0.5 mL, Nebulization, Q30MIN PRN (RT), Eduardo Gaitan M.D.  •  ipratropium-albuterol (DUONEB) nebulizer solution, 3 mL, Nebulization, Q4HRS (RT), Harsha Chavis M.D., 3 mL at 08/11/20 0625  •  atorvastatin (LIPITOR) tablet 40 mg, 40 mg, Enteral Tube, Q EVENING, Eduardo Gaitan M.D., 40 mg at 08/10/20 1652  •   acetaminophen (TYLENOL) tablet 650 mg, 650 mg, Enteral Tube, Q6HRS PRN, Eduardo Gaitan M.D.  •  Respiratory Therapy Consult, , Nebulization, Continuous RT, LAURA Evans Jr..O.  •  ipratropium-albuterol (DUONEB) nebulizer solution, 3 mL, Nebulization, Q2HRS PRN (RT), LAURA Evans Jr..OSalma, 3 mL at 08/05/20 0240  •  MD Alert...ICU Electrolyte Replacement per Pharmacy, , Other, PHARMACY TO DOSE, LAURA Evans Jr..O.  •  senna-docusate (PERICOLACE or SENOKOT S) 8.6-50 MG per tablet 2 Tab, 2 Tab, Enteral Tube, BID, 2 Tab at 08/11/20 0615 **AND** polyethylene glycol/lytes (MIRALAX) PACKET 1 Packet, 1 Packet, Enteral Tube, QDAY PRN, 1 Packet at 08/07/20 1015 **AND** magnesium hydroxide (MILK OF MAGNESIA) suspension 30 mL, 30 mL, Enteral Tube, QDAY PRN, 30 mL at 08/05/20 1732 **AND** bisacodyl (DULCOLAX) suppository 10 mg, 10 mg, Rectal, QDAY PRN, Poncho Sy Jr. D.O., 10 mg at 08/06/20 1735  •  labetalol (NORMODYNE/TRANDATE) injection 10 mg, 10 mg, Intravenous, Q4HRS PRN, Poncho Sy Jr. D.O., 10 mg at 08/10/20 0110  (Medications reviewed.)    Cardiac Imaging and Procedures Review  CARDIAC STUDIES/PROCEDURES:     CARDIAC CATHETERIZATION CONCLUSIONS by Arsenio Loredo (08/02/20)  1. Acute inferior STEMI  2. Successful PCI of the mid and distal RCA 92.5 x 15 mm Kyle drug-eluting stent   2.5 x 22 mm Kyle drug-eluting stent.)  3. Residual ischemia involving the left main coronary artery and circumflex  4. Severe elevation in LVEDP     ECHOCARDIOGRAM CONCLUSIONS (08/02/20)  Intubated. Technically difficult. Only subcostals available.  Moderately reduced left ventricular systolic function. Left ventricular   ejection fraction is visually estimated to be 35%.  Left ventricular hypokinesis of the inferolateral, inferior and   inferoseptal walls.  The right ventricle was normal in size and function.  Mild tricuspid regurgitation. Estimated right ventricular systolic   pressure  is 55 mmHg; moderate  pulmonary hypertension. Right atrial   pressure is estimated to be 8 mmHg.  Normal pericardium without effusion.    EKG performed on (08/08/20) EKG shows sinus rhythm with anterior ST depression.       Assessment/Plan  1. STEMI, Coronary artery disease with stent placement: He is clinically doing well without recurrence of his angina.  We will continue with current medical care including aspirin, clopidogrel, metoprolol, lisinopril and atorvastatin.  2. Ischemic cardiomyopathy: The overall volume status is adequate. We will continue with current care  3. Atrial fibrillation on anticoagulation therapy (Eliquis)    Thank you for allowing me to participate in the care of this patient.  I will continue to follow this patient    Please contact me with any questions.    Willie Snell M.D.   Cardiologist, [facility]  [phone]

## 2020-08-11 NOTE — PROGRESS NOTES
Cortrak Placement    Tube Team verified patient name and medical record number prior to tube placement.  Cortrak tube (55 inches, 12 Angolan) placed at 75 cm in right nare.  Per Cortrak picture, tube appears to be in the small bowel.  Nursing Instructions: Awaiting KUB to confirm placement before use for medications or feeding. Once placement confirmed, flush tube with 30 ml of water, and then remove and save stylet, in patient medication drawer.

## 2020-08-11 NOTE — PROGRESS NOTES
Dr. Fay at bedside to assess patient at shift change. Notified him of blood secretions; ok per MD to give morning dose of Eliquis.

## 2020-08-11 NOTE — CARE PLAN
Problem: Respiratory:  Goal: Respiratory status will improve  Intervention: Assess and monitor pulmonary status  Note: Assessed with head to toe assessment. Patient has increased work of breathing. He is on the high flow nasal cannula with oxygen saturation within defined limits. MD aware of patient's respiratory status.      Problem: Skin Integrity  Goal: Risk for impaired skin integrity will decrease  Intervention: Implement precautions to protect skin integrity in collaboration with the interdisciplinary team  Note: Patient's skin assessed. Dressing changed over skin tear. Patient has a mepilex on his sacrum and one on each heel to prevent skin breakdown.

## 2020-08-11 NOTE — PROGRESS NOTES
[facility] Progress Note      Admit Date: 8/2/2020  Resident(s): Stephen Fay  Attending: JIA Nolasco/ Dr. Sy    Date & Time:   8/11/2020   6:21 AM       Patient ID:    Name:             Sukhi Howard     YOB: 1945  Age:                 74 y.o.  male   MRN:               [id number]      ID:  74 yr old male with PMHx of DLD, hypothyroidism, COPD, lung cancer S/P chemotherapy in 2005, presented 8/2/20 as a transfer from [facility] where he presented for sudden retrosternal chest pain 8/10, found to have an inferior STEMI, received tenecteplase, following which he had an episode of Vfib needing defibrillation and 1 round of ACLS. He was then intubated, following which high volume hemoptysis was noted necessitating TXA and Kcentra. He was started on Amiodarone gtt, Fentanyl, obtunded on admission with GCS 3. He underwent a bronchoscopy on admission significant for a right mainstem bronchus friable mass which was removed and sent to path, which was benign. GCS improved to 11T, he was taken to the cath lab and underwent PCI with KEYLA to RCA. Early on 8/3/20 AM he had PEA and Code Blue was called with him attaining ROSC after Epinephrine and 1 round of ACLS. He was also noted to be leukocytic and hypothermic to 35 C, currently normothermic. He is currently off pressors, off sedation, alert and oriented, extubated to BiPAP yesterday and now on Oxymask. He is also in atrial fibrillation with controlled ventricular rate.        Interval Events:    -overngiht pulled out cortrak again, placed again late in AM    -agitated. Consider haldol PRN for agtiation at night  -  - atrial fibrillation with controlled rate, on Apixaban, PO Metoprolol  - Cards recs continuation of Amiodarone for now  -start lisinopril for ischemiccardiomyopathy  -not tolerating bipap refuses, is oriented x4.  On HFNC 40L/40%  - on triple therapy with ASA, Plavix, Eliquis  -good UOP  - hypernatremia 159.  Increased free water flushes 400 q4  hours  -repleting potassium  - last BM 8/10  - afebrile, complete course of Unasyn, WBC in teen's, perhaps chronically high  -palliative consulted    Review of Systems   Constitutional: Negative for fever and malaise/fatigue.   HENT: Negative for sinus pain.    Eyes: Negative for blurred vision, double vision and photophobia.   Respiratory: Positive for sputum production. Negative for cough, shortness of breath and stridor.    Cardiovascular: Negative for chest pain and orthopnea.   Gastrointestinal: Negative for abdominal pain, blood in stool, constipation, diarrhea, nausea and vomiting.   Genitourinary: Negative for dysuria, frequency and urgency.   Musculoskeletal: Negative for joint pain and myalgias.   Skin: Negative for itching and rash.   Neurological: Positive for weakness. Negative for tingling, focal weakness and headaches.   Psychiatric/Behavioral: Negative for suicidal ideas. The patient is not nervous/anxious and does not have insomnia.    All other systems reviewed and are negative.      VITALS:  Pulse: 94  Blood Pressure : 138/64      Monitored Temp 2  Av.9 °C (98.5 °F)  Min: 36.1 °C (97 °F)  Max: 37.6 °C (99.7 °F)  Temp  Av.6 °C (97.9 °F)  Min: 36.3 °C (97.3 °F)  Max: 36.8 °C (98.3 °F)         Physical Exam:  Physical Exam     GEN: HFNC in place. Patient is oriented x4, but somewhat agitated  HEENT: NC/AT.  NGT in place.  Cardiovascular: Afib rate controlled  Pulmonary: Effort normal breath sounds good no wheezing no crackles  Abdominal: Soft, bowel sounds sluggish, slight distention nontender  Genitourinary: Cesar in place  Musculoskeletal: Moving all the extremities. No extremity edema  Skin warm and dry  Neurological:  alert and following commands    Consultants:  Cardiology    HemoDynamics:  Pulse: 94 Blood Pressure : 138/64       Respiratory:     Respiration: (!) 26, Pulse Oximetry: 99 %    Chest Tube Drains:    Recent Labs     08/10/20  0119   ISTATAPH 7.530*   ISTATAPCO2 38.4*    ISTATAPO2 61*   ISTATATCO2 33   BEVYWOR8OEU 93   ISTATARTHCO3 32.0*   ISTATARTBE 9*   ISTATTEMP 37.1 C   ISTATFIO2 40   ISTATSPEC Arterial   ISTATAPHTC 7.528*   CUJZGFHL0WV 61*       Fluids:  Intake/Output       08/09/20 0700 - 08/10/20 0659 08/10/20 0700 - 08/11/20 0659 08/11/20 0700 - 08/12/20 0659      0700-1859 1900-0659 Total 0700-1859 1900-0659 Total 0700-1859 1900-0659 Total       Intake    I.V.  147.1  139.9 287  116  -- 116  --  -- --    Precedex Volume 19 39.9 58.9 16 -- 16 -- -- --    Amiodarone Volume 128.1 -- 128.1 -- -- -- -- -- --    Volume (mL) (potassium chloride in water (KCL) ivpb **Administer in ICU only** 40 mEq) -- 100 100 100 -- 100 -- -- --    NG/GT  100  300 400  300  430 730  --  -- --    Intake (mL) ([REMOVED] Enteral Tube 08/09/20 Cortrak - Gastric Left nare) 100 300 400 300 430 730 -- -- --    IV Piggyback  100  -- 100  --  -- --  --  -- --    Volume (mL) (ampicillin/sulbactam (UNASYN) 3 g in  mL IVPB) 100 -- 100 -- -- -- -- -- --    Enteral  60  660 720  420  450 870  --  -- --    Free Water / Tube Flush 60 660 720 420 450 870 -- -- --    Total Intake 407.1 1099.9 1507  -- -- --       Output    Urine  910  640 1550  800  -- 800  --  -- --    Output (mL) (Urethral Catheter Temperature probe)  800 -- 800 -- -- --    Drains  0  -- 0  --  -- --  --  -- --    Residual Amount (mL) (Discarded) ([REMOVED] Enteral Tube 08/09/20 Cortrak - Gastric Left nare) 0 -- 0 -- -- -- -- -- --    Stool  --  -- --  --  -- --  --  -- --    Number of Times Stooled 0 x 1 x 1 x -- -- -- -- -- --    Emesis/NG output  0  -- 0  --  -- --  --  -- --    Output (mL) ([REMOVED] Enteral Tube 08/09/20 Cortrak - Gastric Left nare) 0 -- 0 -- -- -- -- -- --    Total Output  800 -- 800 -- -- --       Net I/O     -502.9 459.9 -43 36 880 916 -- -- --           Recent Labs     08/09/20  0350 08/10/20  0430 08/11/20  0300   SODIUM 153* 157* 159*   POTASSIUM 4.0 3.1* 3.2*   CHLORIDE 108  113* 116*   CO2 34* 33 33   BUN 41* 47* 50*   CREATININE 1.17 1.14 1.16   CALCIUM 7.7* 8.1* 8.3*     Body mass index is 26.83 kg/m².    GI/Nutrition:  Recent Labs     08/09/20  0350 08/10/20  0430 08/10/20  11320  0300   ALTSGPT  --  48  --   --    ASTSGOT  --  53*  --   --    ALKPHOSPHAT  --  72  --   --    TBILIRUBIN  --  0.6  --   --    PREALBUMIN  --   --  8.9*  --    GLUCOSE 107* 120*  --  160*       Heme:  Recent Labs     08/09/20  0350 08/10/20  0430 08/11/20  030   RBC 3.40* 3.45* 3.24*   HEMOGLOBIN 10.5* 10.9* 10.0*   HEMATOCRIT 33.0* 33.4* 31.9*   PLATELETCT 278 359 366       Infectious Disease:  Monitored Temp 2  Av.9 °C (98.5 °F)  Min: 36.1 °C (97 °F)  Max: 37.6 °C (99.7 °F)  Temp  Av.6 °C (97.9 °F)  Min: 36.3 °C (97.3 °F)  Max: 36.8 °C (98.3 °F)  Recent Labs     08/09/20  0350 08/10/20  0430 08/11/20  0300   WBC 12.4* 16.6* 19.9*   NEUTSPOLYS 92.10* 85.90* 87.90*   LYMPHOCYTES 0.90* 4.70* 4.80*   MONOCYTES 3.50 8.10 5.40   EOSINOPHILS 0.00 0.10 0.40   BASOPHILS 0.00 0.20 0.30   ASTSGOT  --  53*  --    ALTSGPT  --  48  --    ALKPHOSPHAT  --  72  --    TBILIRUBIN  --  0.6  --        Medications:  • potassium bicarbonate  25 mEq     • Metoprolol Tartrate  5 mg     • Pharmacy  1 Each     • apixaban  5 mg     • aspirin  81 mg     • clopidogrel  75 mg     • metoprolol  25 mg     • amiodarone  400 mg     • budesonide-formoterol  2 Puff     • racepinephrine  0.5 mL     • ipratropium-albuterol  3 mL     • atorvastatin  40 mg     • acetaminophen  650 mg     • Respiratory Therapy Consult       • ipratropium-albuterol  3 mL     • MD Alert...Adult ICU Electrolyte Replacement per Pharmacy       • senna-docusate  2 Tab      And   • polyethylene glycol/lytes  1 Packet      And   • magnesium hydroxide  30 mL      And   • bisacodyl  10 mg     • labetalol  10 mg     • dexmedetomidine (PRECEDEX) infusion  0-1.5 mcg/kg/hr Stopped (08/10/20 0830)        Imaging:  DX-ABDOMEN FOR TUBE PLACEMENT   Final  Result      Enteric tube tip projects over the stomach.      IR-MIDLINE CATHETER INSERTION WO GUIDANCE > AGE 3   Final Result                  Ultrasound-guided midline placement performed by qualified nursing staff    as above.          DX-ABDOMEN FOR TUBE PLACEMENT   Final Result      Enteric tube projects over the stomach.      Distended loops of bowel throughout the abdomen may represent ileus.         DX-CHEST-FOR LINE PLACEMENT Perform procedure in: Patient's Room   Final Result      Right central line has been removed. No pneumothorax is seen.      Bilateral airspace opacities likely representing multifocal pneumonia.      Multifocal areas of parenchymal scarring are again noted. Underlying emphysematous changes.      Likely small bilateral pleural effusions.         DX-CHEST-PORTABLE (1 VIEW)   Final Result      Possible slight increase in hazy opacities in left lung base which could represent atelectasis and/or pneumonitis.      Otherwise no significant interval change.         DX-ABDOMEN FOR TUBE PLACEMENT   Final Result      Enteric tube has been placed and the tip projects over the stomach.      DX-CHEST-PORTABLE (1 VIEW)   Final Result      Removal of endotracheal and Dobbhoff tubes.      No other significant interval change.      DX-CHEST-PORTABLE (1 VIEW)   Final Result         1.  Interstitial pulmonary parenchymal prominence suggest chronic underlying lung disease, component of interstitial edema and/or infiltrates not excluded. Stable since prior study   2.  Small bilateral pleural effusions, stable since prior study   3.  Hyperexpansion of lungs favors changes of COPD.   4.  Atherosclerosis                  DX-CHEST-PORTABLE (1 VIEW)   Final Result         1.  Interstitial pulmonary parenchymal prominence suggest chronic underlying lung disease, component of interstitial edema and/or infiltrates not excluded. Stable since prior study   2.  Small bilateral pleural effusions, stable since prior  study   3.  Hyperexpansion of lungs favors changes of COPD.   4.  Atherosclerosis               DX-CHEST-PORTABLE (1 VIEW)   Final Result         1.  Interstitial pulmonary parenchymal prominence suggest chronic underlying lung disease, component of interstitial edema and/or infiltrates not excluded. Stable since prior study   2.  Small bilateral pleural effusions, stable since prior study   3.  Hyperexpansion of lungs favors changes of COPD.   4.  Atherosclerosis            DX-CHEST-PORTABLE (1 VIEW)   Final Result         1.  Interstitial pulmonary parenchymal prominence suggest chronic underlying lung disease, component of interstitial edema and/or infiltrates not excluded. Stable since prior study   2.  Small bilateral pleural effusions, stable since prior study   3.  Hyperexpansion of lungs favors changes of COPD.   4.  Atherosclerosis         DX-CHEST-PORTABLE (1 VIEW)   Final Result         1.  Interstitial pulmonary parenchymal prominence suggest chronic underlying lung disease, component of interstitial edema and/or infiltrates not excluded. Stable since prior study   2.  Small bilateral pleural effusions, decreased on the right since prior study   3.  Hyperexpansion of lungs favors changes of COPD.   4.  Atherosclerosis      DX-ABDOMEN FOR TUBE PLACEMENT   Final Result      Feeding tube tip at the distal stomach.      DX-CHEST-LIMITED (1 VIEW)   Final Result         1.  Interstitial pulmonary parenchymal prominence suggest chronic underlying lung disease, component of interstitial edema and/or infiltrates not excluded. Stable since prior study   2.  Left upper lobe nodular density, see dedicated CT performed August 2, 2020 for further characterization.   3.  Small bilateral pleural effusions   4.  Hyperexpansion of lungs favors changes of COPD.   5.  Atherosclerosis   6.  Right internal jugular central line is seen, there is no new central line appreciated since prior study.      DX-ABDOMEN FOR TUBE  PLACEMENT   Final Result         1.  Nonspecific bowel gas pattern.   2.  Nasogastric tube tip terminates overlying the expected location of the pylorus or first duodenal segment.      DX-CHEST-PORTABLE (1 VIEW)   Final Result         1.  Interstitial pulmonary parenchymal prominence suggest chronic underlying lung disease, component of interstitial edema and/or infiltrates not excluded.   2.  Left upper lobe nodular density, see dedicated CT performed today for further characterization.   3.  Small bilateral pleural effusions   4.  Hyperexpansion of lungs favors changes of COPD.   5.  Atherosclerosis      EC-ECHOCARDIOGRAM COMPLETE W/O CONT   Final Result      CT-CHEST (THORAX) WITH   Final Result      1.  Diffuse bullous emphysematous changes of the chest with 3 areas of spiculation, 2 in the left upper lobe, and one in the right lower lobe.      2.  Differential diagnosis includes lung carcinoma versus acute and chronic inflammatory changes associated with bullous disease.      3.  Comparison with prior imaging, if available, is recommended. Alternatively PET/CT or biopsy may be of value to exclude lung carcinoma.      4.  No thoracic adenopathy.      5.  Bibasilar areas of atelectasis or pneumonia.      6.  No significant pleural effusion.      7.  No evidence of acute hemorrhage identified.      Fleischner Society pulmonary nodule recommendations:         CT-HEAD W/O   Final Result      1.  No acute intracranial process.      2.  Atrophy and small vessel ischemic change.      CL-LEFT HEART CATHETERIZATION WITH POSSIBLE INTERVENTION    (Results Pending)         Assessment and plan    * Acute on chronic systolic heart failure (HCC)- (present on admission)  Assessment & Plan  - ischemic cardiomyopathy, EF 35%, left heart cath with KEYLA to RCA for STEMI, cardiac arrest  Plan  - ASA, Plavix, statin, will start BB, ACEi, Aldactone as able, holding for current need for pressors  - Lasix discontinued for  hypernatremia    Coronary artery disease due to calcified coronary lesion- (present on admission)  Assessment & Plan  - multivessel disease - 100% RCA occlusion, 100% LCx occlusion, left main 70% stenosis, LAD 30%  - S/P KEYLA to RCA, possible reperfusion injury manifested by PEA, attained ROSC  Plan  - Cardiology following  - ASA, Plavix, statin, Metoprolol  - to start ACEi as able    Cardiac arrest (HCC)- (present on admission)  Assessment & Plan  - underlying ischemic heart disease, multivessel disease, initially received tenecteplase at outside facility, had Vfib, attained ROSC with 1 round of ACLS  - subsequently had PEA after cath at [facility] 8/3, attained ROSC with Epi and one round of ACLS  Plan  - Amiodarone to continue per Cardiology  - watch for post cath/ MI complications with continuous telemetry monitoring    Acute ST elevation myocardial infarction (STEMI) due to occlusion of right coronary artery (HCC)- (present on admission)  Assessment & Plan  - RCA STEMI < 100% occlusion on cath on admission  - S/P KEYLA to RCA, EF 35%  - currently on pressor support but off pressors from 8/7  Plan  - continue ASA, Plavix, statin per Cortrak  - metoprolol 25 bid  - Cardiology following, appreciate recs    PAF (paroxysmal atrial fibrillation) ([facility])  Assessment & Plan  - likely from increased arrhythmogenic myocardium post STEMI, cardiac arrest  - PRN Metoprolol IV for RVR  - scheduled PO Metoprolol  - CHADSVASC 3, on Eliquis    COPD (chronic obstructive pulmonary disease) ([facility])- (present on admission)  Assessment & Plan  - underlying COPD, unclear if he is on supplemental O2 support at home  - extubated successfully on 8/8 initially to BiPAP, currently saturating well on Oxymask at 40%  - Symbicort, Duonebs, RT per protocol    Leukocytosis- (present on admission)  Assessment & Plan  - likely stress reaction, patient also hypothermic but this could be from hypoperfusion from cardiac arrest  - trending down to normal  range, now normothermic  Plan  - Unasyn to cover for aspiration PNA, complete course    Ischemic cardiomyopathy (HCC)- (present on admission)  Assessment & Plan  - EF 35% at the time of cardiac catheterization during admission  - currently on ASA, Plavix, statin, Metoprolol    History of lung cancer  Assessment & Plan  - in 2005, S/P chemo, recent hemoptysis and friable mass on bronch raising suspicion for recurrence  - repeat bronchoscopy 8/4 with no evidence of endobronchial lesion/ mass, evident only old blood lavaged and sent for studies  - unlikely recurrence of lung cancer  - path from bronchoscopic tissue sample benign    Encephalopathy acute- (present on admission)  Assessment & Plan  - likely toxic, metabolic, possibly hypoxic/ anoxic during Vfib/ PEA but he was alert and able to communicate this AM  - resolved, alert, oriented, able to follow commands    Metabolic acidosis- (present on admission)  Assessment & Plan  resolved    Hyperglycemia- (present on admission)  Assessment & Plan  - BG<200, SSI      DISPO: ICU, possible transfer tomorrow    Code Status: DNR    Quality Measures:  Cesar Catheter: yes  DVT Prophylaxis: Eliquis  Ulcer Prophylaxis: Pepcid  Antibiotics: Unasyn  Lines: PIV lines

## 2020-08-11 NOTE — PROGRESS NOTES
"Critical Care Progress Note    Date of admission  8/2/2020    Chief Complaint  74 y.o. male admitted 8/2/2020 with cardiac arrest, STEMI, resp failure    Hospital Course    74 y.o. male with a past medical history of hypercholesterolemia, hypothyroidism, COPD, lung cancer status post chemotherapy in 2005 who presented 8/2/2020 as a transfer from [facility] where he presented for sudden onset of retrosternal crushing chest pain which was rated at that time as 8 out of 10 and radiating to the left arm.  He was found to have an inferior ST elevation myocardial infarction and received tenecteplase for treatment.  He subsequently had a reperfusion ventricular fibrillation which required defibrillation and 1 round of ACLS.  He was intubated with RSI at 1615, following this intubation hemoptysis was noted and was reported to the transport team as \"a liter and a half of serosanguinous fluid\".  He was given TXA and Kcentra, for attempted reversal of TNKase, it is unknown if the outside facility has cryoprecipitate.  He was started on amiodarone and propofol and transferred to our facility for higher level of care.  At [facility] had PCI and KEYLA to RCA with residual  LCX; EF 35%.    8/3 - brief PEA arrest ~ 1 am, received additional crystalloid and PRBCs given protamine for heparin reversal, inhaled TXA.  Hemoglobin had dropped to 7.8 and received 2U PRBCs, full ventilator support, titrating down epinephrine and norepinephrine infusions, arouses and follows commands.  WBC 28.7, suspect stress reaction but Unasyn initiated for possible pneumonia/aspiration.   8/4 - some ongoing bloody secretions from ET tube; repeat bronchoscopy with old blood and airways lavaged without evidence of active bleeding, BAL sent for culture.  On/off norepinephrine infusion, off epinephrine.  Stop bicarbonate infusion today.  Not yet ready for extubation but awake and following commands.  Okay to stop amiodarone per cardiology  8/5 - SBT but not " appropriate for liberation, weaning sedation, advancing tube feed, low-dose norepinephrine, consider diuresis soon, remains on Unasyn with negative cultures to date.  Less bloody secretions in ET tube today status post bronchoscopy yesterday with no clear site for bleeding.  8/6 - path from airway mass 8/3 no malignant cells; starting stress dose steroid for ? PURI, add lasix as jayshree;   8/7 - full ventilator support, Lasix, not ready for extubation; no bowel movement, given Relistor today  8/8 - extubated to BiPAP and tolerating well, sodium high 152, Lasix stopped, Risperdal added for anxiety/agitation  8/9 - off BiPAP, currently on high flow nasal cannula.  Failed swallow and core track placed, start tube feed and free water  8/10 - Continues on HFNC, pulled cortrak      Interval Problem Update  Reviewed last 24 hour events:   - No acute events overnight   - Neuro: off dex, confused   - HR: 70s-90s   - SBP: 100s-160s   - GI: TF at goal   - UOP: 1.5L/24 hrs   - Cesar: yes   - Tm: 37.6   - Lines: CVC   - PPx: GI not indicated, DVT eliquis   - HFNC 30% and 40L   - CXR (personally reviewed and compared to prior): no new   - pulses difficult to palpate in LEs -> art US ordered    Yesterday   - No acute events overnight   - Neuro: dex at 0.4   - HR: 60s-80s   - SBP: 100s-150s   - GI: pulled cortrak   - UOP: 1.6   - Cesar: yes   - Tm: 37.6   - Lines: CVC   - PPx: GI not indicated, DVT eliquis   - HFNC 40% and 60LPM   - CXR (personally reviewed and compared to prior): stable right effusion, LLL increasing opacity   - Off hydrocort   - Stop Risperdal    Review of Systems  Review of Systems   Unable to perform ROS: Acuity of condition        Vital Signs for last 24 hours   Temp:  [36.3 °C (97.3 °F)-36.8 °C (98.3 °F)] 36.8 °C (98.3 °F)  Pulse:  [] 94  Resp:  [18-34] 26  BP: ()/(51-74) 120/66  SpO2:  [75 %-100 %] 98 %    Hemodynamic parameters for last 24 hours       Respiratory Information for the last 24 hours        Physical Exam   Physical Exam  Vitals signs and nursing note reviewed.   Constitutional:       Appearance: He is well-developed. He is ill-appearing.      Interventions: He is intubated.   HENT:      Head: Normocephalic and atraumatic.      Right Ear: External ear normal.      Left Ear: External ear normal.      Nose: Nose normal.      Mouth/Throat:      Mouth: Mucous membranes are moist.   Eyes:      Conjunctiva/sclera: Conjunctivae normal.   Neck:      Musculoskeletal: Neck supple.      Vascular: No JVD.      Trachea: No tracheal deviation.   Cardiovascular:      Rate and Rhythm: Normal rate and regular rhythm.      Pulses: Normal pulses.      Comments: Intermittent atrial fibrillation  Pulmonary:      Effort: He is intubated.      Breath sounds: Rales present. No wheezing.      Comments: High flow nasal cannula today  Chest:      Comments: Barrel chested  Abdominal:      General: Bowel sounds are normal. There is no distension.      Palpations: Abdomen is soft.   Musculoskeletal:         General: No tenderness.   Skin:     General: Skin is dry.      Capillary Refill: Capillary refill takes more than 3 seconds.      Findings: No rash.      Comments: Slight clubbing; cool LEs   Neurological:      Comments: A/O x3, follows commands, odd affect, moves all extremities symmetrically   Psychiatric:      Comments: Unable to assess         Medications  Current Facility-Administered Medications   Medication Dose Route Frequency Provider Last Rate Last Dose   • potassium bicarbonate (KLYTE) effervescent tablet 25 mEq  25 mEq Enteral Tube BID Stephen Fay M.D.       • lisinopril (PRINIVIL) tablet 2.5 mg  2.5 mg Oral Q DAY Stephen Fay M.D.       • Metoprolol Tartrate (LOPRESSOR) injection 5 mg  5 mg Intravenous Q5 MIN PRN Igor Aldana M.D.       • Pharmacy Consult: Enteral tube insertion - review meds/change route/product selection  1 Each Other PHARMACY TO DOSE Eduardo Gaitan M.D.       • apixaban (ELIQUIS) tablet 5 mg  5  mg Enteral Tube BID Eduardo Gaitan M.D.   5 mg at 08/10/20 1652   • aspirin (ASA) chewable tab 81 mg  81 mg Enteral Tube DAILY Eduardo Gaitan M.D.   81 mg at 08/11/20 0615   • clopidogrel (PLAVIX) tablet 75 mg  75 mg Enteral Tube DAILY Eduardo Gaitan M.D.   75 mg at 08/11/20 0616   • metoprolol (LOPRESSOR) tablet 25 mg  25 mg Enteral Tube TWICE DAILY Eduardo Gaitan M.D.   25 mg at 08/11/20 0615   • amiodarone (CORDARONE) tablet 400 mg  400 mg Enteral Tube Q DAY Kristyn Jon M.D.   400 mg at 08/11/20 0615   • budesonide-formoterol (SYMBICORT) 80-4.5 MCG/ACT inhaler 2 Puff  2 Puff Inhalation BID (RT) Eduardo Gaitan M.D.   2 Puff at 08/11/20 0628   • racepinephrine (MICRONEFRIN) 2.25 % nebulizer solution 0.5 mL  0.5 mL Nebulization Q30MIN PRN (RT) Eduardo Gaitan M.D.       • ipratropium-albuterol (DUONEB) nebulizer solution  3 mL Nebulization Q4HRS (RT) Harsha Chavis M.D.   3 mL at 08/11/20 0625   • atorvastatin (LIPITOR) tablet 40 mg  40 mg Enteral Tube Q EVENING Eduardo Gaitan M.D.   40 mg at 08/10/20 1652   • acetaminophen (TYLENOL) tablet 650 mg  650 mg Enteral Tube Q6HRS PRN Eduardo Gaitan M.D.       • Respiratory Therapy Consult   Nebulization Continuous RT Poncho Sy Jr. D.O.       • ipratropium-albuterol (DUONEB) nebulizer solution  3 mL Nebulization Q2HRS PRN (RT) LAURA Evans Jr..O.   3 mL at 08/05/20 0240   • MD Alert...ICU Electrolyte Replacement per Pharmacy   Other PHARMACY TO DOSE Poncho Sy Jr. D.O.       • senna-docusate (PERICOLACE or SENOKOT S) 8.6-50 MG per tablet 2 Tab  2 Tab Enteral Tube BID LAURA Evans Jr..O.   2 Tab at 08/11/20 0615    And   • polyethylene glycol/lytes (MIRALAX) PACKET 1 Packet  1 Packet Enteral Tube QDAY PRN LAURA Evans Jr..O.   1 Packet at 08/07/20 1015    And   • magnesium hydroxide (MILK OF MAGNESIA) suspension 30 mL  30 mL Enteral Tube QDAY PRN LAURA Evans Jr..O.   30 mL at 08/05/20 1732    And   • bisacodyl  (DULCOLAX) suppository 10 mg  10 mg Rectal QDAY PRN Poncho Sy Jr., D.O.   10 mg at 08/06/20 1735   • labetalol (NORMODYNE/TRANDATE) injection 10 mg  10 mg Intravenous Q4HRS PRN Poncho Sy Jr., D.O.   10 mg at 08/10/20 0110   • dexmedetomidine (PRECEDEX) 400 mcg/100mL NS premix infusion  0-1.5 mcg/kg/hr Intravenous Continuous Poncho Sy Jr., D.O.   Stopped at 08/10/20 0830       Fluids    Intake/Output Summary (Last 24 hours) at 8/11/2020 0720  Last data filed at 8/11/2020 0643  Gross per 24 hour   Intake 1966 ml   Output 1450 ml   Net 516 ml       Laboratory  Recent Labs     08/10/20  0119   ISTATAPH 7.530*   ISTATAPCO2 38.4*   ISTATAPO2 61*   ISTATATCO2 33   [id number] 93   ISTATARTHCO3 32.0*   ISTATARTBE 9*   ISTATTEMP 37.1 C   ISTATFIO2 40   ISTATSPEC Arterial   ISTATAPHTC 7.528*   [id number] 61*         Recent Labs     08/09/20  0350 08/10/20  0430 08/11/20  0300   SODIUM 153* 157* 159*   POTASSIUM 4.0 3.1* 3.2*   CHLORIDE 108 113* 116*   CO2 34* 33 33   BUN 41* 47* 50*   CREATININE 1.17 1.14 1.16   CALCIUM 7.7* 8.1* 8.3*     Recent Labs     08/09/20  0350 08/10/20  0430 08/10/20  1139 08/11/20  0300   ALTSGPT  --  48  --   --    ASTSGOT  --  53*  --   --    ALKPHOSPHAT  --  72  --   --    TBILIRUBIN  --  0.6  --   --    PREALBUMIN  --   --  8.9*  --    GLUCOSE 107* 120*  --  160*     Recent Labs     08/09/20  0350 08/10/20  0430 08/11/20  0300   WBC 12.4* 16.6* 19.9*   NEUTSPOLYS 92.10* 85.90* 87.90*   LYMPHOCYTES 0.90* 4.70* 4.80*   MONOCYTES 3.50 8.10 5.40   EOSINOPHILS 0.00 0.10 0.40   BASOPHILS 0.00 0.20 0.30   ASTSGOT  --  53*  --    ALTSGPT  --  48  --    ALKPHOSPHAT  --  72  --    TBILIRUBIN  --  0.6  --      Recent Labs     08/09/20  0350 08/10/20  0430 08/11/20  0300   RBC 3.40* 3.45* 3.24*   HEMOGLOBIN 10.5* 10.9* 10.0*   HEMATOCRIT 33.0* 33.4* 31.9*   PLATELETCT [phone]       Imaging  X-Ray:  I have personally reviewed the images and compared with prior  images.    Assessment/Plan  * Acute on chronic systolic heart failure (HCC)- (present on admission)  Assessment & Plan  Intubated after cardiac arrest in the setting of NSTEMI with hemoptysis after intubation - 8/2  Liberated from ventilator 8/8  Continue titrated oxygen, high flow nasal cannula, increase mobility.  PRN lasix, holding now due to Uremia and hypernatremia    Cardiac arrest (HCC)- (present on admission)  Assessment & Plan  Reportedly V. fib arrest at outside hospital, ? Reperfusion following TNKase  Continue amiodarone PO  Monitor for recurrent arrhythmias, optimize electrolytes    Acute ST elevation myocardial infarction (STEMI) due to occlusion of right coronary artery ([facility])- (present on admission)  Assessment & Plan  Taken to Cath Lab on 8/2/2020, KEYLA placed in RCA  Continue DAPT  Reduced EF 35%, ischemic cardiomyopathy  Meds reviewed, cardiology following    PAF (paroxysmal atrial fibrillation) ([facility])  Assessment & Plan  Rate control, started on metoprolol, anticoagulation with Eliquis now    COPD (chronic obstructive pulmonary disease) ([facility])- (present on admission)  Assessment & Plan  Not in acute exacerbation  RT/O2 Protocols  Titrate supplemental FiO2 to maintain SpO2 >92%    Leukocytosis- (present on admission)  Assessment & Plan  Unasyn completed  Cultures negative  Monitor trend and need for reculture/abx    Ischemic cardiomyopathy ([facility])- (present on admission)  Assessment & Plan  Left ventricular ejection fraction of 35%  Careful volume resuscitation  Continue beta-blocker, ACE inhibitor and diuresis as tolerated    History of lung cancer  Assessment & Plan  Reportedly treated in 2005  3 spiculated masses present on CT chest  Endobronchial mobile tissue mass benign bronchial mucosa, suspect traumatic intubation with bleeding post T and K  Repeat bronchoscopy without any airway lesions noted  Follow-up CT scan 1 to 2 months after recovers    Hypernatremia  Assessment & Plan  2.4L free water  deficit   Begin Free water flushes    Acute respiratory failure with hypoxia (HCC)- (present on admission)  Assessment & Plan  Weaned from ventilator  RT/O2 Protocols  HFNC titrations ongoing  Titrate supplemental FiO2 to maintain SpO2 >92%  Ambulate, IS  Continued pulmonary edema limiting improvement    Encephalopathy acute- (present on admission)  Assessment & Plan  Limit sedatives  GCS 14    Hyperglycemia- (present on admission)  Assessment & Plan  Goal blood glucose 120-180  sliding scale insulin, accuchecks  hypoglycemia protocol      VTE:  NOAC  Ulcer: Not Indicated  Lines: Cesar Catheter  Ongoing indication addressed    I have performed a physical exam and reviewed and updated ROS and Plan today (8/11/2020). In review of yesterday's note (8/10/2020), there are no changes except as documented above.     Discussed patient condition and risk of morbidity and/or mortality with RN, RT, Pharmacy, [employer] [facility] resident, Charge nurse / hot rounds and Patient

## 2020-08-11 NOTE — PROGRESS NOTES
Dr. Sy and Dr. Fay notified that this RN had to doppler pulses on all 4 extremities. No new orders received.

## 2020-08-11 NOTE — THERAPY
Missed Therapy     Patient Name: Sukhi Howard  Age:  74 y.o., Sex:  male  Medical Record #: [id number]  Today's Date: 8/11/2020    Discussed missed therapy with RN       08/11/20 0723   Interdisciplinary Plan of Care Collaboration   IDT Collaboration with  Nursing   Collaboration Comments Attempted to see pt for cognitive evaluation, however he was unable to follow simple 1-step directives and did not answer simple yes/no questions.  SLP to hold today and will attempt will pt is able to participate.

## 2020-08-11 NOTE — PROGRESS NOTES
Patient's wife, Lexx was asked if she has a copy of what medications her  takes at home so that Med Rec can be completed. She will bring in the list next time she comes to the hospital.

## 2020-08-11 NOTE — ASSESSMENT & PLAN NOTE
Weaned from ventilator  RT/O2 Protocols  HFNC titrations ongoing  Titrate supplemental FiO2 to maintain SpO2 >92%  Ambulate, IS  Continued pulmonary edema and likely pneumonia limiting improvement

## 2020-08-11 NOTE — CARE PLAN
Problem: Communication  Goal: The ability to communicate needs accurately and effectively will improve  Outcome: PROGRESSING AS EXPECTED     Problem: Safety  Goal: Will remain free from injury  Outcome: PROGRESSING AS EXPECTED  Goal: Will remain free from falls  Outcome: PROGRESSING AS EXPECTED     Problem: Infection  Goal: Will remain free from infection  Outcome: PROGRESSING AS EXPECTED     Problem: Venous Thromboembolism (VTW)/Deep Vein Thrombosis (DVT) Prevention:  Goal: Patient will participate in Venous Thrombosis (VTE)/Deep Vein Thrombosis (DVT)Prevention Measures  Outcome: PROGRESSING AS EXPECTED     Problem: Bowel/Gastric:  Goal: Normal bowel function is maintained or improved  Outcome: PROGRESSING AS EXPECTED  Goal: Will not experience complications related to bowel motility  Outcome: PROGRESSING AS EXPECTED     Problem: Knowledge Deficit  Goal: Knowledge of disease process/condition, treatment plan, diagnostic tests, and medications will improve  Outcome: PROGRESSING SLOWER THAN EXPECTED  Goal: Knowledge of the prescribed therapeutic regimen will improve  Outcome: PROGRESSING SLOWER THAN EXPECTED     Problem: Discharge Barriers/Planning  Goal: Patient's continuum of care needs will be met  Outcome: PROGRESSING SLOWER THAN EXPECTED

## 2020-08-11 NOTE — CARE PLAN
Respiratory Update    HHFNC 40L / 40%    BIPAP PRN (14/8 40%)    Duoneb Q4  Symbicort BID        Pt tolerating current treatments well with no adverse reactions.

## 2020-08-12 PROBLEM — I73.9 PAD (PERIPHERAL ARTERY DISEASE) (HCC): Status: ACTIVE | Noted: 2020-01-01

## 2020-08-12 PROBLEM — J18.9 PNEUMONIA: Status: ACTIVE | Noted: 2020-01-01

## 2020-08-12 NOTE — PROGRESS NOTES
"Received report from LIZZY Kovacs. Assumed care of patient. Lines and gtts verified. Dr. Fay at bedside during shift change. Discussed patient's labs (WBC increase to 30.3). Also discussed that patient's BP meds were held this morning because his SBP dropped into the 90s. Ok per Dr. Fay to give medications now as patient's SBP is in the 130s. Goal is to get Pallative Care in to see patient today. Patient says that he wants to \"keep fighting\". He is oriented only to self and date. He thinks that he is in a motor home; when patient is then reoriented that he is in the hospital and asked why he is in the hospital, he does not know. When reminded that patient had a heart attack he says \"oh yes, that's right.\"     This RN left a message with Phlebtomoist to draw blood cultures. This RN also left message with Pallative Care RN requesting that she see patient today.   "

## 2020-08-12 NOTE — PROGRESS NOTES
Dr. Gonda notified of potassium 2.9 and WBC 30.3. Orders for blood cultures, K+ replacement, urinalysis, and procalcitonin received

## 2020-08-12 NOTE — PROGRESS NOTES
Cardiology Follow Up Progress Note    Date of Service  8/12/2020    Attending Physician  Poncho Sy Jr., D.O.    Chief Complaint   STEMI, percutaneous intervention with stent placement, atrial fibrillation on chronic anticoagulation therapy, respiratory failure on ventilator support.    LONI Howard is a 74 y.o. male admitted 8/2/2020 with above.    Interim Events  No significant changes noted from cardiac standpoint within the past 24 hours.    Review of Systems  Review of Systems   Unable to perform ROS: Other       Vital signs in last 24 hours  Temp:  [37 °C (98.6 °F)-37.5 °C (99.5 °F)] 37.3 °C (99.1 °F)  Pulse:  [68-95] 82  Resp:  [21-30] 29  BP: ()/(46-79) 92/54  SpO2:  [89 %-100 %] 100 %    Physical Exam  Physical Exam  Constitutional:       Interventions: He is sedated.   HENT:      Head: Normocephalic and atraumatic.   Eyes:      Comments: Closed.   Cardiovascular:      Rate and Rhythm: Normal rate and regular rhythm.   Pulmonary:      Breath sounds: Normal breath sounds.   Abdominal:      General: Bowel sounds are normal.      Palpations: Abdomen is soft.   Skin:     General: Skin is warm and dry.         Lab Review  Lab Results   Component Value Date/Time    WBC 30.3 (HH) 08/12/2020 03:40 AM    RBC 3.03 (L) 08/12/2020 03:40 AM    HEMOGLOBIN 9.6 (L) 08/12/2020 03:40 AM    HEMATOCRIT 29.8 (L) 08/12/2020 03:40 AM    MCV 98.3 (H) 08/12/2020 03:40 AM    MCH 31.7 08/12/2020 03:40 AM    MCHC 32.2 (L) 08/12/2020 03:40 AM    MPV 11.0 08/12/2020 03:40 AM      Lab Results   Component Value Date/Time    SODIUM 155 (H) 08/12/2020 03:40 AM    POTASSIUM 2.9 (L) 08/12/2020 03:40 AM    CHLORIDE 115 (H) 08/12/2020 03:40 AM    CO2 29 08/12/2020 03:40 AM    GLUCOSE 136 (H) 08/12/2020 03:40 AM    BUN 48 (H) 08/12/2020 03:40 AM    CREATININE 1.20 08/12/2020 03:40 AM      Lab Results   Component Value Date/Time    ASTSGOT 53 (H) 08/10/2020 04:30 AM    ALTSGPT 48 08/10/2020 04:30 AM     Lab Results   Component Value  Date/Time    CHOLSTRLTOT 91 (L) 08/03/2020 12:25 AM    LDL 32 08/03/2020 12:25 AM    HDL 51 08/03/2020 12:25 AM    TRIGLYCERIDE 38 08/03/2020 12:25 AM    TROPONINT 3931 (H) 08/03/2020 12:25 AM       No results for input(s): NTPROBNP in the last 72 hours.  (Above labs reviewed.)       Current Facility-Administered Medications:   •  cefepime (MAXIPIME) 2 g in  mL IVPB, 2 g, Intravenous, Q12HRS, Poncho Sy Jr., D.O., Stopped at 08/12/20 1013  •  D5W infusion, , Intravenous, Continuous, Poncho Sy Jr., D.OSalma, Last Rate: 50 mL/hr at 08/12/20 1020  •  potassium bicarbonate (KLYTE) effervescent tablet 25 mEq, 25 mEq, Enteral Tube, BID, Stephen Fay M.D., 25 mEq at 08/12/20 0604  •  lisinopril (PRINIVIL) tablet 2.5 mg, 2.5 mg, Enteral Tube, Q DAY, Poncho Sy Jr., D.O., 2.5 mg at 08/12/20 0735  •  Metoprolol Tartrate (LOPRESSOR) injection 5 mg, 5 mg, Intravenous, Q5 MIN PRN, Igor Aldana M.D.  •  Pharmacy Consult: Enteral tube insertion - review meds/change route/product selection, 1 Each, Other, PHARMACY TO DOSE, Eduardo Gaitan M.D.  •  apixaban (ELIQUIS) tablet 5 mg, 5 mg, Enteral Tube, BID, Eduardo Gaitan M.D., 5 mg at 08/12/20 0945  •  aspirin (ASA) chewable tab 81 mg, 81 mg, Enteral Tube, DAILY, Eduardo Gaitan M.D., 81 mg at 08/12/20 0605  •  clopidogrel (PLAVIX) tablet 75 mg, 75 mg, Enteral Tube, DAILY, Eduardo Gaitan M.D., 75 mg at 08/12/20 0604  •  metoprolol (LOPRESSOR) tablet 25 mg, 25 mg, Enteral Tube, TWICE DAILY, Eduardo Gaitan M.D., 25 mg at 08/12/20 0737  •  amiodarone (CORDARONE) tablet 400 mg, 400 mg, Enteral Tube, Q DAY, Kristyn Jon M.D., 400 mg at 08/12/20 0605  •  budesonide-formoterol (SYMBICORT) 80-4.5 MCG/ACT inhaler 2 Puff, 2 Puff, Inhalation, BID (RT), Eduardo Gaitan M.D., 2 Puff at 08/12/20 0741  •  racepinephrine (MICRONEFRIN) 2.25 % nebulizer solution 0.5 mL, 0.5 mL, Nebulization, Q30MIN PRN (RT), Eduardo Gaitan M.D.  •  ipratropium-albuterol (DUONEB) nebulizer  solution, 3 mL, Nebulization, Q4HRS (RT), Harsha Chavis M.D., 3 mL at 08/12/20 0741  •  atorvastatin (LIPITOR) tablet 40 mg, 40 mg, Enteral Tube, Q EVENING, Eduardo Gaitan M.D., 40 mg at 08/11/20 1725  •  acetaminophen (TYLENOL) tablet 650 mg, 650 mg, Enteral Tube, Q6HRS PRN, Eduardo Gaitan M.D., 650 mg at 08/12/20 0035  •  Respiratory Therapy Consult, , Nebulization, Continuous RT, Poncho Sy Jr., D.O.  •  ipratropium-albuterol (DUONEB) nebulizer solution, 3 mL, Nebulization, Q2HRS PRN (RT), LAURA Evans Jr..OSalma, 3 mL at 08/05/20 0240  •  MD Alert...ICU Electrolyte Replacement per Pharmacy, , Other, PHARMACY TO DOSE, LAURA Evans Jr..O.  •  senna-docusate (PERICOLACE or SENOKOT S) 8.6-50 MG per tablet 2 Tab, 2 Tab, Enteral Tube, BID, 2 Tab at 08/12/20 0604 **AND** polyethylene glycol/lytes (MIRALAX) PACKET 1 Packet, 1 Packet, Enteral Tube, QDAY PRN, 1 Packet at 08/11/20 1729 **AND** magnesium hydroxide (MILK OF MAGNESIA) suspension 30 mL, 30 mL, Enteral Tube, QDAY PRN, 30 mL at 08/05/20 1732 **AND** bisacodyl (DULCOLAX) suppository 10 mg, 10 mg, Rectal, QDAY PRN, Poncho Sy Jr. D.O., 10 mg at 08/06/20 1735  •  labetalol (NORMODYNE/TRANDATE) injection 10 mg, 10 mg, Intravenous, Q4HRS PRN, Poncho Sy Jr. D.O., 10 mg at 08/10/20 0110  (Medications reviewed.)    Cardiac Imaging and Procedures Review  CARDIAC STUDIES/PROCEDURES:     CARDIAC CATHETERIZATION CONCLUSIONS by Arsenio Loredo (08/02/20)  1. Acute inferior STEMI  2. Successful PCI of the mid and distal RCA 92.5 x 15 mm Kyle drug-eluting stent   2.5 x 22 mm Kyle drug-eluting stent.)  3. Residual ischemia involving the left main coronary artery and circumflex  4. Severe elevation in LVEDP     ECHOCARDIOGRAM CONCLUSIONS (08/02/20)  Intubated. Technically difficult. Only subcostals available.  Moderately reduced left ventricular systolic function. Left ventricular   ejection fraction is visually estimated to be 35%.  Left  ventricular hypokinesis of the inferolateral, inferior and   inferoseptal walls.  The right ventricle was normal in size and function.  Mild tricuspid regurgitation. Estimated right ventricular systolic   pressure  is 55 mmHg; moderate pulmonary hypertension. Right atrial   pressure is estimated to be 8 mmHg.  Normal pericardium without effusion.     EKG performed on (08/08/20) EKG shows sinus rhythm with anterior ST depression.    Assessment/Plan  1. STEMI, coronary artery disease with stent placement: He is stable from coronary standpoint, however, overall prognosis is poor. Palliative care being consulted.  2. Ischemic cardiomyopathy  3. Atrial fibrillation on anticoagulation therapy (Eliquis)    Thank you for allowing me to participate in the care of this patient.  Cardiology will sign off on this patient    Please contact me with any questions.    Willie Snell M.D.   Cardiologist, [facility]  [phone]

## 2020-08-12 NOTE — CARE PLAN
Problem: Infection  Goal: Will remain free from infection  Intervention: Assess signs and symptoms of infection  Note: Patient's WBCs have increased to 30.3. Discussed with doctor possible causes. Blood cultures, urinalysis, and chest x ray completed. Patient started on antibiotics.      Problem: Discharge Barriers/Planning  Goal: Patient's continuum of care needs will be met  Intervention: Involve patient and significant other/support system in setting and prioritizing goals for hospital stay and discharge  Note: Pallative Care RN called and this RN spoke with her regarding coming to see patient today. Wife should be at bedside this afternoon and is anxious to meet with the Pallative Care team. Pallative Care RN, Daniella, will plan to come to bedside this afternoon when wife is present if time allows.

## 2020-08-12 NOTE — THERAPY
Speech Language Pathology  Daily Treatment     Patient Name: Sukhi Howard  Age:  74 y.o., Sex:  male  Medical Record #: [id number]  Today's Date: 8/12/2020     Precautions  Precautions: (P) Fall Risk, Swallow Precautions ( See Comments), Nasogastric Tube  Comments: (P) HFNC    Assessment    Pt seen on this date for dysphagia therapy. Pt A&Ox2 with disorientation to place and event and agreeable to PO trials. Pt currently on 30L at 30% Fi02. Upon inspection of oral cavity, copious bloody secretions appreciated on roof of mouth and back of throat and oral care was provided. Intermittent coughing appreciated with trials of single ice chips and immediate coughing appreciated in 100% of MTL which is concerning for aspiration. With coughing events, pt coughing up thick bloody secretions and removed with yankour. Upon palpation, laryngeal elevation was weak and swallow trigger delayed up to 12 seconds. Pt declined further PO trials, but continued to ask for swabs. At this time, recommend continuation of NPO with cortrak and okay for 5 single ice chips an hour with RN to reduce xerostomia and maintain integrity of the swallow. Recommend aggressive oral care.    Plan    Continue NPO with cortrak for nutrition. Okay for 5 single ice chips with RN only and aggressive oral care.    Continue current treatment plan.    Discharge Recommendations: (P) Recommend post-acute placement for additional speech therapy services prior to discharge home       Objective       08/12/20 1052   Charge Group   SLP Swallowing Dysfunction Treatment Swallowing Dysfunction Treatment   Precautions   Precautions Fall Risk;Swallow Precautions ( See Comments);Nasogastric Tube   Comments HFNC   Vitals   O2 (LPM) 30   O2 Delivery Device Heated High Flow Nasal Cannula   Pain 0 - 10 Group   Therapist Pain Assessment Post Activity Pain Same as Prior to Activity;Nurse Notified;0   Cognitive-Linguistic   Level of Consciousness Alert   Orientation Level Not Oriented  to Place, Not Oriented to Event   Dysphagia    Dysphagia X   Positioning / Behavior Modification Modulate Rate or Bite Size;Self Monitoring   Other Treatments trials of single ice chips and MTL via teaspoon   Diet / Liquid Recommendation NPO;Pre-Feeding Trials with SLP Only   Nutritional Liquid Intake Rating Scale Nothing by mouth   Nutritional Food Intake Rating Scale Nothing by mouth   Nursing Communication Swallow Precaution Sign Posted at Head of Bed   Skilled Intervention Compensatory Strategies;Verbal Cueing   Short Term Goals   Short Term Goal # 1 Pt will participate in prefeeding trials 1:1 with SLP with no s/sx of aspiraiton and min cues.    Goal Outcome # 1 Progressing slower than expected

## 2020-08-12 NOTE — CARE PLAN
Problem: Respiratory:  Goal: Respiratory status will improve  Note: Collaboration w/ RT. Continuous O2 monitoring in place. Lung sounds assessed and documented      Problem: Skin Integrity  Goal: Risk for impaired skin integrity will decrease  Note: Pt turned Q2h, pillows utilized for positioning/comfort. Waffle cushion in place. Skin assessed and documented. Bed bath performed, skin cleansed w/ soap and water/CHG wipes

## 2020-08-12 NOTE — THERAPY
"Speech Language Pathology   Initial Assessment     Patient Name: Sukhi Howard  AGE:  74 y.o., SEX:  male  Medical Record #: [id number]  Today's Date: 8/12/2020     Precautions  Precautions: Fall Risk, Swallow Precautions ( See Comments)  Comments: HFNC    Assessment    75 YO  male admitted 8/2/2020 2/2 retrosternal chest pain. PMHx: COPD, emphysema of lung, high cholesterol, hypothyroid and lung cancer. CMHx: acute STEMI, cardiac arrest, COPD, leukocytosis, ischemic cardiomyopathy, hx of lung cancer, encephalopathy acute, metabolic acidosis, hyperglycemia. Head CT \"No acute intracranial process.\" CXR 8/8/20 \"Interstitial pulmonary parenchymal prominence suggest chronic underlying lung disease\".Pt intubated 8/2/20 and extubated 8/8/20    Pt seen on this date for a cognitive-linguistic evaluation. Non-standardized measures were used to assess an array of cognitive domains which found minimal deficits in reasoning and auditory comprehension and moderate deficits in problem solving. Delayed recall task presented to pt and he recalled 0/4 words independently, 1/4 given categorical clue, and 1/4 given choice of three options. Immediate recall, thought organization, and confrontational naming were found to be within normal limits. Increased WOB appreciated when speaking and RT at bedside provided breathing treatment. When responding to questions pt had a delayed response time (~5 seconds delay before responding). Pt independently recalled address, age, and birthday. He declined further evaluation 2/2 lethargy. Would recommend further evaluation as pt is able to participate.     Plan    Cognitive speech therapy in the acute care setting and at next level of care    Recommend Speech Therapy 5 times per week until therapy goals are met for the following treatments:  Dysphagia Training, Cognitive-Linguistic Training and Patient / Family / Caregiver Education.    Discharge Recommendations: (P) Recommend post-acute placement for " additional speech therapy services prior to discharge home       Objective       08/12/20 1146   Precautions   Precautions Fall Risk;Swallow Precautions ( See Comments)   Comments HFNC   Vitals   O2 (LPM) 30   O2 Delivery Device Heated High Flow Nasal Cannula   Pain 0 - 10 Group   Therapist Pain Assessment Post Activity Pain Same as Prior to Activity;Nurse Notified;0   Prior Living Situation   Lives with - Patient's Self Care Capacity Spouse   Comments Pt reports he lives with his Spouse near [facility]   Prior Level Of Function   Communication Within Functional Limits   Swallow Within Functional Limits   Dentition Intact   Dentures None   Hearing Within Functional Limits for Evaluation   Hearing Aid None   Vision Within Functional Limits for Evaluation   Patient's Primary Language English   Occupation (Pre-Hospital Vocational) Retired Due To Age   Verbal Expression   Verbal Expression / Aphasia Eval (WDL) X   Vocal Quality Breathy;Decreased Intensity   Verbal Output Automatic Minimal (4)   Verbal Output Conversation Minimal (4)   Repetition: Single Words Within Functional Limits (6-7)   Repetition: Phrases Within Functional Limits (6-7)   Repetition: Sentences Within Functional Limits (6-7)   Naming Within Functional Limits (6-7)   Word Finding Deficits Within Functional Limits (6-7)   Auditory Comprehension   Auditory Comprehension (WDL) X   Yes / No Questions: Personal Information Within Functional Limits (6-7)   Yes / No Questions: General Information Within Functional Limits (6-7)   Yes / No Questions: Abstract Supervision (5)   Identifies Body Parts Within Functional Limits (6-7)   Follows One Unit Commands Within Functional Limits (6-7)   Follows Two Unit Commands Within Functional Limits (6-7)   Follows Three Unit Commands Minimal (4)   Reading Comprehension   Comments will need assessment   Written Expression   Comments will need assessment   Cognitive-Linguistic   Cognitive-Linguistic (WDL) X   Level of  Consciousness Alert   Orientation Level Not Oriented to Reason;Not Oriented to Place   Sustained Attention Within Functional Limits (6-7)   Short Term Memory Moderate-Severe   Immediate Memory Within Functional Limits (6-7)   Long Term Memory / Reminiscing Within Functional Limits (6-7)   Simple Reasoning / Problem Solving Minimal (4)   Complex Reasoning  / Problem Solving Moderate (3)   Insight into Deficits Within Functional Limits (6-7)   Social / Pragmatic Communication   Social / Pragmatic Communication WDL   Short Term Goals   Short Term Goal # 1 Pt will participate in prefeeding trials 1:1 with SLP with no s/sx of aspiraiton and min cues.    Short Term Goal # 2 NEW 8/12: Pt will be oriented x4 to all spheres independently   Short Term Goal # 3 NEW 8/12: Pt will provide solutions to functional problem solving questions with >80% accuracy and min cues

## 2020-08-12 NOTE — PROGRESS NOTES
"Critical Care Progress Note    Date of admission  8/2/2020    Chief Complaint  74 y.o. male admitted 8/2/2020 with cardiac arrest, STEMI, resp failure    Hospital Course    74 y.o. male with a past medical history of hypercholesterolemia, hypothyroidism, COPD, lung cancer status post chemotherapy in 2005 who presented 8/2/2020 as a transfer from [facility] where he presented for sudden onset of retrosternal crushing chest pain which was rated at that time as 8 out of 10 and radiating to the left arm.  He was found to have an inferior ST elevation myocardial infarction and received tenecteplase for treatment.  He subsequently had a reperfusion ventricular fibrillation which required defibrillation and 1 round of ACLS.  He was intubated with RSI at 1615, following this intubation hemoptysis was noted and was reported to the transport team as \"a liter and a half of serosanguinous fluid\".  He was given TXA and Kcentra, for attempted reversal of TNKase, it is unknown if the outside facility has cryoprecipitate.  He was started on amiodarone and propofol and transferred to our facility for higher level of care.  At [facility] had PCI and KEYLA to RCA with residual  LCX; EF 35%.    8/3 - brief PEA arrest ~ 1 am, received additional crystalloid and PRBCs given protamine for heparin reversal, inhaled TXA.  Hemoglobin had dropped to 7.8 and received 2U PRBCs, full ventilator support, titrating down epinephrine and norepinephrine infusions, arouses and follows commands.  WBC 28.7, suspect stress reaction but Unasyn initiated for possible pneumonia/aspiration.   8/4 - some ongoing bloody secretions from ET tube; repeat bronchoscopy with old blood and airways lavaged without evidence of active bleeding, BAL sent for culture.  On/off norepinephrine infusion, off epinephrine.  Stop bicarbonate infusion today.  Not yet ready for extubation but awake and following commands.  Okay to stop amiodarone per cardiology  8/5 - SBT but not " appropriate for liberation, weaning sedation, advancing tube feed, low-dose norepinephrine, consider diuresis soon, remains on Unasyn with negative cultures to date.  Less bloody secretions in ET tube today status post bronchoscopy yesterday with no clear site for bleeding.  8/6 - path from airway mass 8/3 no malignant cells; starting stress dose steroid for ? PURI, add lasix as jayshree;   8/7 - full ventilator support, Lasix, not ready for extubation; no bowel movement, given Relistor today  8/8 - extubated to BiPAP and tolerating well, sodium high 152, Lasix stopped, Risperdal added for anxiety/agitation  8/9 - off BiPAP, currently on high flow nasal cannula.  Failed swallow and core track placed, start tube feed and free water  8/10 - Continues on HFNC, pulled cortrak      Interval Problem Update  Reviewed last 24 hour events:   - No acute events overnight   - Neuro: off dex, confused   - HR: 70s-90s   - SBP: 100s-160s   - GI: TF at goal   - UOP: 1.5L/24 hrs   - Cesar: yes   - Tm: 37.6   - Lines: CVC   - PPx: GI not indicated, DVT eliquis   - HFNC 30% and 40L   - CXR (personally reviewed and compared to prior): no new   - pulses difficult to palpate in LEs -> art US ordered    Yesterday   - No acute events overnight   - Neuro: dex at 0.4   - HR: 60s-80s   - SBP: 100s-150s   - GI: pulled cortrak   - UOP: 1.6   - Cesar: yes   - Tm: 37.6   - Lines: CVC   - PPx: GI not indicated, DVT eliquis   - HFNC 40% and 60LPM   - CXR (personally reviewed and compared to prior): stable right effusion, LLL increasing opacity   - Off hydrocort   - Stop Risperdal    Review of Systems  Review of Systems   Unable to perform ROS: Acuity of condition        Vital Signs for last 24 hours   Temp:  [37.1 °C (98.8 °F)-37.2 °C (99 °F)] 37.2 °C (99 °F)  Pulse:  [] 68  Resp:  [21-34] 21  BP: ()/(54-81) 137/79  SpO2:  [89 %-100 %] 93 %    Hemodynamic parameters for last 24 hours       Respiratory Information for the last 24 hours        Physical Exam   Physical Exam  Vitals signs and nursing note reviewed.   Constitutional:       Appearance: He is well-developed. He is ill-appearing.      Interventions: He is intubated.   HENT:      Head: Normocephalic and atraumatic.      Right Ear: External ear normal.      Left Ear: External ear normal.      Nose: Nose normal.      Mouth/Throat:      Mouth: Mucous membranes are moist.   Eyes:      Conjunctiva/sclera: Conjunctivae normal.   Neck:      Musculoskeletal: Neck supple.      Vascular: No JVD.      Trachea: No tracheal deviation.   Cardiovascular:      Rate and Rhythm: Normal rate and regular rhythm.      Pulses: Normal pulses.      Comments: Intermittent atrial fibrillation  Pulmonary:      Effort: He is intubated.      Breath sounds: Rales present. No wheezing.      Comments: High flow nasal cannula today  Chest:      Comments: Barrel chested  Abdominal:      General: Bowel sounds are normal. There is no distension.      Palpations: Abdomen is soft.   Musculoskeletal:         General: No tenderness.   Skin:     General: Skin is dry.      Capillary Refill: Capillary refill takes more than 3 seconds.      Findings: No rash.      Comments: Slight clubbing; cool LEs   Neurological:      Comments: A/O x3, follows commands, odd affect, moves all extremities symmetrically   Psychiatric:      Comments: Unable to assess         Medications  Current Facility-Administered Medications   Medication Dose Route Frequency Provider Last Rate Last Dose   • potassium bicarbonate (KLYTE) effervescent tablet 25 mEq  25 mEq Enteral Tube BID Stephen Fay M.D.   25 mEq at 08/11/20 1728   • [START ON 8/12/2020] lisinopril (PRINIVIL) tablet 2.5 mg  2.5 mg Enteral Tube Q DAY Poncho Sy Jr., D.O.       • Metoprolol Tartrate (LOPRESSOR) injection 5 mg  5 mg Intravenous Q5 MIN PRN Igor Aldana M.D.       • Pharmacy Consult: Enteral tube insertion - review meds/change route/product selection  1 Each Other PHARMACY TO DOSE  Eduardo Gaitan M.D.       • apixaban (ELIQUIS) tablet 5 mg  5 mg Enteral Tube BID Eduardo Gaitan M.D.   5 mg at 08/11/20 0936   • aspirin (ASA) chewable tab 81 mg  81 mg Enteral Tube DAILY Eduardo Gaitan M.D.   81 mg at 08/11/20 0615   • clopidogrel (PLAVIX) tablet 75 mg  75 mg Enteral Tube DAILY Eduardo Gaitan M.D.   75 mg at 08/11/20 0616   • metoprolol (LOPRESSOR) tablet 25 mg  25 mg Enteral Tube TWICE DAILY Eduardo Gaitan M.D.   25 mg at 08/11/20 1723   • amiodarone (CORDARONE) tablet 400 mg  400 mg Enteral Tube Q DAY Kristyn Jon M.D.   400 mg at 08/11/20 0615   • budesonide-formoterol (SYMBICORT) 80-4.5 MCG/ACT inhaler 2 Puff  2 Puff Inhalation BID (RT) Eduardo Gaitan M.D.   2 Puff at 08/11/20 1849   • racepinephrine (MICRONEFRIN) 2.25 % nebulizer solution 0.5 mL  0.5 mL Nebulization Q30MIN PRN (RT) Eduardo Gaitan M.D.       • ipratropium-albuterol (DUONEB) nebulizer solution  3 mL Nebulization Q4HRS (RT) Harsha Chavis M.D.   3 mL at 08/11/20 1849   • atorvastatin (LIPITOR) tablet 40 mg  40 mg Enteral Tube Q EVENING Eduardo Gaitan M.D.   40 mg at 08/11/20 1725   • acetaminophen (TYLENOL) tablet 650 mg  650 mg Enteral Tube Q6HRS PRN Eduardo Gaitan M.D.   650 mg at 08/11/20 1426   • Respiratory Therapy Consult   Nebulization Continuous RT Poncho Sy Jr., D.O.       • ipratropium-albuterol (DUONEB) nebulizer solution  3 mL Nebulization Q2HRS PRN (RT) LAURA Evans Jr..OSalma   3 mL at 08/05/20 0240   • MD Alert...ICU Electrolyte Replacement per Pharmacy   Other PHARMACY TO DOSE Poncho Sy Jr., D.O.       • senna-docusate (PERICOLACE or SENOKOT S) 8.6-50 MG per tablet 2 Tab  2 Tab Enteral Tube BID Poncho Sy Jr., D.O.   2 Tab at 08/11/20 1727    And   • polyethylene glycol/lytes (MIRALAX) PACKET 1 Packet  1 Packet Enteral Tube QDAY PRN Poncho Sy Jr., D.O.   1 Packet at 08/11/20 1729    And   • magnesium hydroxide (MILK OF MAGNESIA) suspension 30 mL  30 mL Enteral Tube QDAY  PRN Poncho Sy Jr., D.O.   30 mL at 08/05/20 1732    And   • bisacodyl (DULCOLAX) suppository 10 mg  10 mg Rectal QDAY PRN Poncho Sy Jr., D.O.   10 mg at 08/06/20 1735   • labetalol (NORMODYNE/TRANDATE) injection 10 mg  10 mg Intravenous Q4HRS PRN Poncho Sy Jr., D.O.   10 mg at 08/10/20 0110       Fluids    Intake/Output Summary (Last 24 hours) at 8/11/2020 2145  Last data filed at 8/11/2020 1800  Gross per 24 hour   Intake 3030 ml   Output 824 ml   Net 2206 ml       Laboratory  Recent Labs     08/10/20  0119   ISTATAPH 7.530*   ISTATAPCO2 38.4*   ISTATAPO2 61*   ISTATATCO2 33   [id number] 93   ISTATARTHCO3 32.0*   ISTATARTBE 9*   ISTATTEMP 37.1 C   ISTATFIO2 40   ISTATSPEC Arterial   ISTATAPHTC 7.528*   [id number] 61*         Recent Labs     08/09/20  0350 08/10/20  0430 08/11/20  0300   SODIUM 153* 157* 159*   POTASSIUM 4.0 3.1* 3.2*   CHLORIDE 108 113* 116*   CO2 34* 33 33   BUN 41* 47* 50*   CREATININE 1.17 1.14 1.16   CALCIUM 7.7* 8.1* 8.3*     Recent Labs     08/09/20  0350 08/10/20  0430 08/10/20  1139 08/11/20  0300   ALTSGPT  --  48  --   --    ASTSGOT  --  53*  --   --    ALKPHOSPHAT  --  72  --   --    TBILIRUBIN  --  0.6  --   --    PREALBUMIN  --   --  8.9*  --    GLUCOSE 107* 120*  --  160*     Recent Labs     08/09/20  0350 08/10/20  0430 08/11/20  0300   WBC 12.4* 16.6* 19.9*   NEUTSPOLYS 92.10* 85.90* 87.90*   LYMPHOCYTES 0.90* 4.70* 4.80*   MONOCYTES 3.50 8.10 5.40   EOSINOPHILS 0.00 0.10 0.40   BASOPHILS 0.00 0.20 0.30   ASTSGOT  --  53*  --    ALTSGPT  --  48  --    ALKPHOSPHAT  --  72  --    TBILIRUBIN  --  0.6  --      Recent Labs     08/09/20  0350 08/10/20  0430 08/11/20  0300   RBC 3.40* 3.45* 3.24*   HEMOGLOBIN 10.5* 10.9* 10.0*   HEMATOCRIT 33.0* 33.4* 31.9*   PLATELETCT 278 050 406       Imaging  X-Ray:  I have personally reviewed the images and compared with prior images.    Assessment/Plan  * Acute on chronic systolic heart failure (HCC)- (present on  admission)  Assessment & Plan  Intubated after cardiac arrest in the setting of NSTEMI with hemoptysis after intubation - 8/2  Liberated from ventilator 8/8  Continue titrated oxygen, high flow nasal cannula, increase mobility.  PRN lasix, holding now due to Uremia and hypernatremia    Cardiac arrest ([facility])- (present on admission)  Assessment & Plan  Reportedly V. fib arrest at outside hospital, ? Reperfusion following TNKase  Continue amiodarone PO  Monitor for recurrent arrhythmias, optimize electrolytes    Acute ST elevation myocardial infarction (STEMI) due to occlusion of right coronary artery ([facility])- (present on admission)  Assessment & Plan  Taken to Cath Lab on 8/2/2020, KEYLA placed in RCA  Continue DAPT  Reduced EF 35%, ischemic cardiomyopathy  Meds reviewed, cardiology following    PAF (paroxysmal atrial fibrillation) ([facility])  Assessment & Plan  Rate control, started on metoprolol, anticoagulation with Eliquis now    COPD (chronic obstructive pulmonary disease) ([facility])- (present on admission)  Assessment & Plan  Not in acute exacerbation  RT/O2 Protocols  Titrate supplemental FiO2 to maintain SpO2 >92%    Leukocytosis- (present on admission)  Assessment & Plan  Unasyn completed  Cultures negative  Monitor trend and need for reculture/abx    Ischemic cardiomyopathy ([facility])- (present on admission)  Assessment & Plan  Left ventricular ejection fraction of 35%  Careful volume resuscitation  Continue beta-blocker, ACE inhibitor and diuresis as tolerated    History of lung cancer  Assessment & Plan  Reportedly treated in 2005  3 spiculated masses present on CT chest  Endobronchial mobile tissue mass benign bronchial mucosa, suspect traumatic intubation with bleeding post T and K  Repeat bronchoscopy without any airway lesions noted  Follow-up CT scan 1 to 2 months after recovers    Hypernatremia  Assessment & Plan  2.4L free water deficit   Begin Free water flushes    Acute respiratory failure with hypoxia ([facility])-  (present on admission)  Assessment & Plan  Weaned from ventilator  RT/O2 Protocols  HFNC titrations ongoing  Titrate supplemental FiO2 to maintain SpO2 >92%  Ambulate, IS  Continued pulmonary edema limiting improvement    Encephalopathy acute- (present on admission)  Assessment & Plan  Limit sedatives  GCS 14    Hyperglycemia- (present on admission)  Assessment & Plan  Goal blood glucose 120-180  sliding scale insulin, accuchecks  hypoglycemia protocol      VTE:  NOAC  Ulcer: Not Indicated  Lines: Cesar Catheter  Ongoing indication addressed    I have performed a physical exam and reviewed and updated ROS and Plan today (8/11/2020). In review of yesterday's note (8/10/2020), there are no changes except as documented above.     Discussed patient condition and risk of morbidity and/or mortality with RN, RT, Pharmacy, [employer] resident, Charge nurse / hot rounds and Patient

## 2020-08-12 NOTE — PROGRESS NOTES
"Critical Care Progress Note    Date of admission  8/2/2020    Chief Complaint  74 y.o. male admitted 8/2/2020 with cardiac arrest, STEMI, resp failure    Hospital Course    74 y.o. male with a past medical history of hypercholesterolemia, hypothyroidism, COPD, lung cancer status post chemotherapy in 2005 who presented 8/2/2020 as a transfer from [facility] where he presented for sudden onset of retrosternal crushing chest pain which was rated at that time as 8 out of 10 and radiating to the left arm.  He was found to have an inferior ST elevation myocardial infarction and received tenecteplase for treatment.  He subsequently had a reperfusion ventricular fibrillation which required defibrillation and 1 round of ACLS.  He was intubated with RSI at 1615, following this intubation hemoptysis was noted and was reported to the transport team as \"a liter and a half of serosanguinous fluid\".  He was given TXA and Kcentra, for attempted reversal of TNKase, it is unknown if the outside facility has cryoprecipitate.  He was started on amiodarone and propofol and transferred to our facility for higher level of care.  At [facility] had PCI and KEYLA to RCA with residual  LCX; EF 35%.    8/3 - brief PEA arrest ~ 1 am, received additional crystalloid and PRBCs given protamine for heparin reversal, inhaled TXA.  Hemoglobin had dropped to 7.8 and received 2U PRBCs, full ventilator support, titrating down epinephrine and norepinephrine infusions, arouses and follows commands.  WBC 28.7, suspect stress reaction but Unasyn initiated for possible pneumonia/aspiration.   8/4 - some ongoing bloody secretions from ET tube; repeat bronchoscopy with old blood and airways lavaged without evidence of active bleeding, BAL sent for culture.  On/off norepinephrine infusion, off epinephrine.  Stop bicarbonate infusion today.  Not yet ready for extubation but awake and following commands.  Okay to stop amiodarone per cardiology  8/5 - SBT but not " appropriate for liberation, weaning sedation, advancing tube feed, low-dose norepinephrine, consider diuresis soon, remains on Unasyn with negative cultures to date.  Less bloody secretions in ET tube today status post bronchoscopy yesterday with no clear site for bleeding.  8/6 - path from airway mass 8/3 no malignant cells; starting stress dose steroid for ? PURI, add lasix as jayshree;   8/7 - full ventilator support, Lasix, not ready for extubation; no bowel movement, given Relistor today  8/8 - extubated to BiPAP and tolerating well, sodium high 152, Lasix stopped, Risperdal added for anxiety/agitation  8/9 - off BiPAP, currently on high flow nasal cannula.  Failed swallow and core track placed, start tube feed and free water  8/10 - Continues on HFNC, pulled cortrak      Interval Problem Update  Reviewed last 24 hour events:   - WBC worsened to 30.3, PCT 0.35   - Neuro: confused, AO x2   - HR: 70s-90s   - SBP: 90s-130s   - GI: TF at goal, last BM today   - UOP: 1.2L/24 hrs   - Cesar: yes   - Tm: 37.5   - Lines: midline   - PPx: GI not indicated, DVT eliquis   - HFNC 30%, 30L   - CXR (personally reviewed and compared to prior): patchy B/L opacities, LLL in particular    Yesterday   - No acute events overnight   - Neuro: off dex, confused   - HR: 70s-90s   - SBP: 100s-160s   - GI: TF at goal   - UOP: 1.5L/24 hrs   - Cesar: yes   - Tm: 37.6   - Lines: CVC   - PPx: GI not indicated, DVT eliquis   - HFNC 30% and 40L   - CXR (personally reviewed and compared to prior): no new   - pulses difficult to palpate in LEs -> art US ordered    Review of Systems  Review of Systems   Unable to perform ROS: Mental acuity        Vital Signs for last 24 hours   Temp:  [37 °C (98.6 °F)-37.5 °C (99.5 °F)] 37.5 °C (99.5 °F)  Pulse:  [68-95] 84  Resp:  [21-30] 25  BP: (101-137)/(46-79) 116/46  SpO2:  [89 %-100 %] 95 %    Hemodynamic parameters for last 24 hours       Respiratory Information for the last 24 hours       Physical Exam    Physical Exam  Vitals signs and nursing note reviewed.   Constitutional:       Appearance: He is well-developed. He is ill-appearing.      Interventions: He is intubated.   HENT:      Head: Normocephalic and atraumatic.      Right Ear: External ear normal.      Left Ear: External ear normal.      Nose: Nose normal.      Mouth/Throat:      Mouth: Mucous membranes are dry.   Eyes:      Conjunctiva/sclera: Conjunctivae normal.   Neck:      Musculoskeletal: Neck supple.      Vascular: No JVD.      Trachea: No tracheal deviation.   Cardiovascular:      Rate and Rhythm: Normal rate and regular rhythm.      Pulses:           Dorsalis pedis pulses are 0 on the right side and 0 on the left side.        Posterior tibial pulses are 1+ on the right side and 1+ on the left side.      Comments: Intermittent atrial fibrillation  Pulmonary:      Effort: Respiratory distress present. He is intubated.      Breath sounds: Rhonchi and rales present. No wheezing.      Comments: High flow nasal cannula  Abdominal:      General: Bowel sounds are normal. There is no distension.      Palpations: Abdomen is soft.   Musculoskeletal:         General: No tenderness.   Skin:     General: Skin is dry.      Capillary Refill: Capillary refill takes more than 3 seconds.      Findings: No rash.      Comments: Slight clubbing; cool LEs  Multiple areas of necrosis on toes   Neurological:      Comments: A/O x1-2, confused, follows commands, moves all extremities symmetrically   Psychiatric:      Comments: Unable to assess             Medications  Current Facility-Administered Medications   Medication Dose Route Frequency Provider Last Rate Last Dose   • cefepime (MAXIPIME) 2 g in  mL IVPB  2 g Intravenous Q12HRS Poncho Sy Jr., D.O.       • potassium bicarbonate (KLYTE) effervescent tablet 25 mEq  25 mEq Enteral Tube BID Stephen Fay M.D.   25 mEq at 08/12/20 0604   • lisinopril (PRINIVIL) tablet 2.5 mg  2.5 mg Enteral Tube Q DAY Poncho BARBER  Yossi Waters, D.OSalma   2.5 mg at 08/12/20 0735   • Metoprolol Tartrate (LOPRESSOR) injection 5 mg  5 mg Intravenous Q5 MIN PRN Igor Aldana M.D.       • Pharmacy Consult: Enteral tube insertion - review meds/change route/product selection  1 Each Other PHARMACY TO DOSE Eduardo Gaitan M.D.       • apixaban (ELIQUIS) tablet 5 mg  5 mg Enteral Tube BID Eduardo Gaitan M.D.   5 mg at 08/11/20 2150   • aspirin (ASA) chewable tab 81 mg  81 mg Enteral Tube DAILY Eduardo Gaitan M.D.   81 mg at 08/12/20 0605   • clopidogrel (PLAVIX) tablet 75 mg  75 mg Enteral Tube DAILY Eduardo Gaitan M.D.   75 mg at 08/12/20 0604   • metoprolol (LOPRESSOR) tablet 25 mg  25 mg Enteral Tube TWICE DAILY Eduardo Gaitan M.D.   25 mg at 08/12/20 0737   • amiodarone (CORDARONE) tablet 400 mg  400 mg Enteral Tube Q DAY Kristyn Jon M.D.   400 mg at 08/12/20 0605   • budesonide-formoterol (SYMBICORT) 80-4.5 MCG/ACT inhaler 2 Puff  2 Puff Inhalation BID (RT) Eduardo Gaitan M.D.   2 Puff at 08/12/20 0741   • racepinephrine (MICRONEFRIN) 2.25 % nebulizer solution 0.5 mL  0.5 mL Nebulization Q30MIN PRN (RT) Eduardo Gaitan M.D.       • ipratropium-albuterol (DUONEB) nebulizer solution  3 mL Nebulization Q4HRS (RT) Harsha Chavis M.D.   3 mL at 08/12/20 0741   • atorvastatin (LIPITOR) tablet 40 mg  40 mg Enteral Tube Q EVENING Eduardo Gaitan M.D.   40 mg at 08/11/20 1725   • acetaminophen (TYLENOL) tablet 650 mg  650 mg Enteral Tube Q6HRS PRN Eduardo Gaitan M.D.   650 mg at 08/12/20 0035   • Respiratory Therapy Consult   Nebulization Continuous RT Poncho Sy Jr., D.OSalma       • ipratropium-albuterol (DUONEB) nebulizer solution  3 mL Nebulization Q2HRS PRN (RT) Poncho Sy Jr., D.O.   3 mL at 08/05/20 0240   • MD Alert...ICU Electrolyte Replacement per Pharmacy   Other PHARMACY TO DOSE Poncho Sy Jr., D.O.       • senna-docusate (PERICOLACE or SENOKOT S) 8.6-50 MG per tablet 2 Tab  2 Tab Enteral Tube BID Poncho Sy Jr.,  D.O.   2 Tab at 08/12/20 0604    And   • polyethylene glycol/lytes (MIRALAX) PACKET 1 Packet  1 Packet Enteral Tube QDAY PRN Poncho Sy Jr., D.O.   1 Packet at 08/11/20 1729    And   • magnesium hydroxide (MILK OF MAGNESIA) suspension 30 mL  30 mL Enteral Tube QDAY PRN Poncho Sy Jr., D.O.   30 mL at 08/05/20 1732    And   • bisacodyl (DULCOLAX) suppository 10 mg  10 mg Rectal QDAY PRN Poncho Sy Jr., D.O.   10 mg at 08/06/20 1735   • labetalol (NORMODYNE/TRANDATE) injection 10 mg  10 mg Intravenous Q4HRS PRN Poncho Sy Jr., D.O.   10 mg at 08/10/20 0110       Fluids    Intake/Output Summary (Last 24 hours) at 8/12/2020 0815  Last data filed at 8/12/2020 0600  Gross per 24 hour   Intake 3620 ml   Output 1072 ml   Net 2548 ml       Laboratory  Recent Labs     08/10/20  0119   ISTATAPH 7.530*   ISTATAPCO2 38.4*   ISTATAPO2 61*   ISTATATCO2 33   [id number] 93   ISTATARTHCO3 32.0*   ISTATARTBE 9*   ISTATTEMP 37.1 C   ISTATFIO2 40   ISTATSPEC Arterial   ISTATAPHTC 7.528*   LBLAPVEX2IX 61*         Recent Labs     08/10/20  0430 08/11/20  0300 08/12/20  0340   SODIUM 157* 159* 155*   POTASSIUM 3.1* 3.2* 2.9*   CHLORIDE 113* 116* 115*   CO2 33 33 29   BUN 47* 50* 48*   CREATININE 1.14 1.16 1.20   CALCIUM 8.1* 8.3* 8.5     Recent Labs     08/10/20  0430 08/10/20  1139 08/11/20  0300 08/12/20  0340   ALTSGPT 48  --   --   --    ASTSGOT 53*  --   --   --    ALKPHOSPHAT 72  --   --   --    TBILIRUBIN 0.6  --   --   --    PREALBUMIN  --  8.9*  --   --    GLUCOSE 120*  --  160* 136*     Recent Labs     08/10/20  0430 08/11/20  0300 08/12/20  0340   WBC 16.6* 19.9* 30.3*   NEUTSPOLYS 85.90* 87.90* 88.30*   LYMPHOCYTES 4.70* 4.80* 4.90*   MONOCYTES 8.10 5.40 3.50   EOSINOPHILS 0.10 0.40 1.70   BASOPHILS 0.20 0.30 0.30   ASTSGOT 53*  --   --    ALTSGPT 48  --   --    ALKPHOSPHAT 72  --   --    TBILIRUBIN 0.6  --   --      Recent Labs     08/10/20  0430 08/11/20  0300 08/12/20  0340   RBC 3.45* 3.24* 3.03*    HEMOGLOBIN 10.9* 10.0* 9.6*   HEMATOCRIT 33.4* 31.9* 29.8*   PLATELETCT 359 366 370       Imaging  X-Ray:  I have personally reviewed the images and compared with prior images.    Assessment/Plan  * Acute on chronic systolic heart failure (HCC)- (present on admission)  Assessment & Plan  Intubated after cardiac arrest in the setting of NSTEMI with hemoptysis after intubation - 8/2  Liberated from ventilator 8/8  Continue to titrate oxygen, high flow nasal cannula, increase mobility.  PRN lasix, holding now due to uremia and hypernatremia    Cardiac arrest ([facility])- (present on admission)  Assessment & Plan  Reportedly V. fib arrest at outside hospital, ? Reperfusion following TNKase  Continue amiodarone PO  Monitor for recurrent arrhythmias, optimize electrolytes    Acute ST elevation myocardial infarction (STEMI) due to occlusion of right coronary artery ([facility])- (present on admission)  Assessment & Plan  Taken to Cath Lab on 8/2/2020, KEYLA placed in RCA  Continue DAPT  Reduced EF 35%, ischemic cardiomyopathy  Meds reviewed, cardiology following    PAF (paroxysmal atrial fibrillation) ([facility])  Assessment & Plan  Rate control, started on metoprolol, anticoagulation with Eliquis now    COPD (chronic obstructive pulmonary disease) ([facility])- (present on admission)  Assessment & Plan  Not in acute exacerbation, no wheeze present  RT/O2 Protocols  Titrate supplemental FiO2 to maintain SpO2 >92%    Leukocytosis- (present on admission)  Assessment & Plan  Unasyn completed  Cultures negative  Leukocytosis worsening today, chest x-ray with left lower lobe infiltrate  Start cefepime    Ischemic cardiomyopathy ([facility])- (present on admission)  Assessment & Plan  Left ventricular ejection fraction of 35%  Careful volume resuscitation  Continue beta-blocker, ACE inhibitor and diuresis as tolerated    History of lung cancer  Assessment & Plan  Reportedly treated in 2005  3 spiculated masses present on CT chest  Endobronchial mobile tissue  mass benign bronchial mucosa, suspect traumatic intubation with bleeding post TNK  Repeat bronchoscopy without any airway lesions noted  Follow-up CT scan 1 to 2 months after recovers    Pneumonia- (present on admission)  Assessment & Plan  Worsening leukocytosis, left lower lobe infiltrate  Start Maxipime  Cultures sent    Hypernatremia- (present on admission)  Assessment & Plan  Continue free water flushes    Acute respiratory failure with hypoxia (HCC)- (present on admission)  Assessment & Plan  Weaned from ventilator  RT/O2 Protocols  HFNC titrations ongoing  Titrate supplemental FiO2 to maintain SpO2 >92%  Ambulate, IS  Continued pulmonary edema and likely pneumonia limiting improvement    Encephalopathy acute- (present on admission)  Assessment & Plan  Limit sedatives  GCS 14    Hyperglycemia- (present on admission)  Assessment & Plan  Goal blood glucose 120-180  sliding scale insulin, accuchecks  hypoglycemia protocol      VTE:  NOAC  Ulcer: Not Indicated  Lines: Cesar Catheter  Ongoing indication addressed    I have performed a physical exam and reviewed and updated ROS and Plan today (8/12/2020). In review of yesterday's note (8/11/2020), there are no changes except as documented above.     Discussed patient condition and risk of morbidity and/or mortality with RN, RT, Pharmacy, [employer] resident, Charge nurse / hot rounds and Patient     Addendum: Patient had a large bloody bowel movement this evening, vascular studies with severe PAD bilaterally.  Family meeting was held with the patient's wife, 2 daughters and son.  It was determined given his progressive worsening state and poor prognosis for meaningful long-term, quality outcome that his goals of care would be shifted to comfort based approach.  Comfort care orders were placed as well as a hospice consult, the patient will be allowed to die with dignity and peace.  The patient's daughters request that he be allowed to have a beer if able.

## 2020-08-12 NOTE — CARE PLAN
Respiratory Update    Treatment modality: HHFNC 30L/30% Fio2  DuoNeb Q4  Symbicort BID  Pt tolerating current treatments well with no adverse reactions.

## 2020-08-12 NOTE — PROGRESS NOTES
[facility] Progress Note      Admit Date: 8/2/2020  Resident(s): Stephen Fay  Attending: JIA Nolasco/ Dr. Sy    Date & Time:   8/12/2020   8:38 AM       Patient ID:    Name:             Sukhi Howard     YOB: 1945  Age:                 74 y.o.  male   MRN:               [id number]      ID:    74 yr old male with PMHx of DLD, hypothyroidism, COPD, lung cancer S/P chemotherapy in 2005, presented 8/2/20 as a transfer from [facility] where he presented for sudden retrosternal chest pain 8/10, found to have an inferior STEMI, received tenecteplase, following which he had an episode of Vfib needing defibrillation and 1 round of ACLS. He was then intubated, following which high volume hemoptysis was noted necessitating TXA and Kcentra. He was started on Amiodarone gtt, Fentanyl, obtunded on admission with GCS 3.  He underwent a bronchoscopy on admission significant for a right mainstem bronchus friable mass which was removed and sent to path, which was benign. GCS improved to 11T, he was taken to the cath lab and underwent PCI with KEYLA to RCA 8/2/2020. Early on 8/3/20 AM he had PEA and Code Blue was called with him attaining ROSC after Epinephrine and 1 round of ACLS.  He was extubated 8/8/2020, currently on HFNC. He is also in atrial fibrillation with controlled ventricular rate.      Interval Events:    -WBC's spiked to 30k.  Blood cultures, UA, CXR pending. CXR looks not major changed    -agitated, intermittently oriented. Continue daytime re-orienting  - atrial fibrillation with controlled rate, on Apixaban, PO Metoprolol. Continue amiodarone for vfib arrest. Continue lisinopril    -On HFNC 30L/40%  -on triple therapy with ASA, Plavix, Eliquis  -good UOP  -hypernatremia improving 155.  continue free water flushes 400 q4 hours  -repleting potassium  - last BM 8/9  -started on cefepime 8/12 given WBC to 30k, no other signs of infection/sepsis for time being    -palliative consulted    Review of Systems    Constitutional: Negative for fever and malaise/fatigue.   HENT: Negative for sinus pain.    Eyes: Negative for blurred vision, double vision and photophobia.   Respiratory: Positive for sputum production. Negative for cough, shortness of breath and stridor.    Cardiovascular: Negative for chest pain and orthopnea.   Gastrointestinal: Negative for abdominal pain, blood in stool, constipation, diarrhea, nausea and vomiting.   Genitourinary: Negative for dysuria, frequency and urgency.   Musculoskeletal: Negative for joint pain and myalgias.   Skin: Negative for itching and rash.   Neurological: Positive for weakness. Negative for tingling, focal weakness and headaches.   Psychiatric/Behavioral: Negative for suicidal ideas. The patient is not nervous/anxious and does not have insomnia.    All other systems reviewed and are negative.      VITALS:  Pulse: 84  Blood Pressure : 116/46      Monitored Temp 2  Av.1 °C (98.8 °F)  Min: 36.8 °C (98.2 °F)  Max: 37.5 °C (99.5 °F)  Temp  Av.2 °C (98.9 °F)  Min: 37 °C (98.6 °F)  Max: 37.5 °C (99.5 °F)         Physical Exam:  Physical Exam     GEN: HFNC in place. Patient is oriented x4, but somewhat agitated  HEENT: NC/AT.  NGT in place.  Cardiovascular: Afib rate controlled  Pulmonary: Effort normal breath sounds good no wheezing no crackles  Abdominal: Soft, bowel sounds sluggish, slight distention nontender  Genitourinary: Cesar in place  Musculoskeletal: Moving all the extremities. No extremity edema  Skin warm and dry, pressure wounds on b/l heels and sacrum  Neurological:  alert and following commands    Consultants:  Cardiology    HemoDynamics:  Pulse: 84 Blood Pressure : 116/46       Respiratory:     Respiration: (!) 25, Pulse Oximetry: 95 %    Chest Tube Drains:    Recent Labs     08/10/20  0119   ISTATAPH 7.530*   ISTATAPCO2 38.4*   ISTATAPO2 61*   ISTATATCO2 33   [id number] 93   ISTATARTHCO3 32.0*   ISTATARTBE 9*   ISTATTEMP 37.1 C   ISTATFIO2 40   ISTATSPEC  Arterial   ISTATAPHTC 7.528*   [id number] 61*       Fluids:  Intake/Output       08/10/20 0700 - 08/11/20 0659 08/11/20 0700 - 08/12/20 0659 08/12/20 0700 - 08/13/20 0659      6036-3848 1878-8629 Total 0700-1859 1900-0659 Total 0700-1859 1900-0659 Total       Intake    I.V.  116  -- 116  --  -- --  --  -- --    Precedex Volume 16 -- 16 -- -- -- -- -- --    Volume (mL) (potassium chloride in water (KCL) ivpb **Administer in ICU only** 40 mEq) 100 -- 100 -- -- -- -- -- --    Other  --  -- --  300  220 520  --  -- --    Medications (PO/Enteral Liquids) -- -- -- 300 220 520 -- -- --    NG/GT  300  430 730  600  600 1200  --  -- --    Intake (mL) ([REMOVED] Enteral Tube 08/09/20 Cortrak - Gastric Left nare) 300 430 730 -- -- -- -- -- --    Intake (mL) (Enteral Tube 08/11/20 Cortrak - Gastric) -- -- --  -- -- --    Enteral  420  700 1120  1200  1200 2400  --  -- --    Free Water / Tube Flush  1200 1200 2400 -- -- --    Total Intake 836 1130 1966 2100 2020 4120 -- -- --       Output    Urine  800  650 1450  324  910 1234  --  -- --    Output (mL) (Urethral Catheter Temperature probe)   -- -- --    Total Output   -- -- --       Net I/O     36  1110 2886 -- -- --        Weight: 73.1 kg (161 lb 2.5 oz)  Recent Labs     08/10/20  0430 08/11/20  0300 08/12/20  0340   SODIUM 157* 159* 155*   POTASSIUM 3.1* 3.2* 2.9*   CHLORIDE 113* 116* 115*   CO2 33 33 29   BUN 47* 50* 48*   CREATININE 1.14 1.16 1.20   CALCIUM 8.1* 8.3* 8.5     Body mass index is 27.66 kg/m².    GI/Nutrition:  Recent Labs     08/10/20  0430 08/10/20  1139 08/11/20 0300 08/12/20 0340   ALTSGPT 48  --   --   --    ASTSGOT 53*  --   --   --    ALKPHOSPHAT 72  --   --   --    TBILIRUBIN 0.6  --   --   --    PREALBUMIN  --  8.9*  --   --    GLUCOSE 120*  --  160* 136*       Heme:  Recent Labs     08/10/20  0430 08/11/20  0300 08/12/20  0340   RBC 3.45* 3.24* 3.03*   HEMOGLOBIN  10.9* 10.0* 9.6*   HEMATOCRIT 33.4* 31.9* 29.8*   PLATELETCT 359 366 370       Infectious Disease:  Monitored Temp 2  Av.1 °C (98.8 °F)  Min: 36.8 °C (98.2 °F)  Max: 37.5 °C (99.5 °F)  Temp  Av.2 °C (98.9 °F)  Min: 37 °C (98.6 °F)  Max: 37.5 °C (99.5 °F)  Recent Labs     08/10/20  0430 20  0300 20  0340   WBC 16.6* 19.9* 30.3*   NEUTSPOLYS 85.90* 87.90* 88.30*   LYMPHOCYTES 4.70* 4.80* 4.90*   MONOCYTES 8.10 5.40 3.50   EOSINOPHILS 0.10 0.40 1.70   BASOPHILS 0.20 0.30 0.30   ASTSGOT 53*  --   --    ALTSGPT 48  --   --    ALKPHOSPHAT 72  --   --    TBILIRUBIN 0.6  --   --        Medications:  • cefepime  2 g     • D5W       • potassium bicarbonate  25 mEq     • lisinopril  2.5 mg     • Metoprolol Tartrate  5 mg     • Pharmacy  1 Each     • apixaban  5 mg     • aspirin  81 mg     • clopidogrel  75 mg     • metoprolol  25 mg     • amiodarone  400 mg     • budesonide-formoterol  2 Puff     • racepinephrine  0.5 mL     • ipratropium-albuterol  3 mL     • atorvastatin  40 mg     • acetaminophen  650 mg     • Respiratory Therapy Consult       • ipratropium-albuterol  3 mL     • MD Alert...Adult ICU Electrolyte Replacement per Pharmacy       • senna-docusate  2 Tab      And   • polyethylene glycol/lytes  1 Packet      And   • magnesium hydroxide  30 mL      And   • bisacodyl  10 mg     • labetalol  10 mg          Imaging:  DX-CHEST-LIMITED (1 VIEW)   Final Result      1.  Stable bilateral pulmonary opacities, with largest focal opacity located in the left lung base.   2.  Stable small bilateral pleural effusions.      US-LIT SINGLE LEVEL BILAT   Final Result      DX-ABDOMEN FOR TUBE PLACEMENT   Final Result      Enteric tube tip projects over the stomach.      IR-MIDLINE CATHETER INSERTION WO GUIDANCE > AGE 3   Final Result                  Ultrasound-guided midline placement performed by qualified nursing staff    as above.          DX-ABDOMEN FOR TUBE PLACEMENT   Final Result      Enteric tube  projects over the stomach.      Distended loops of bowel throughout the abdomen may represent ileus.         DX-CHEST-FOR LINE PLACEMENT Perform procedure in: Patient's Room   Final Result      Right central line has been removed. No pneumothorax is seen.      Bilateral airspace opacities likely representing multifocal pneumonia.      Multifocal areas of parenchymal scarring are again noted. Underlying emphysematous changes.      Likely small bilateral pleural effusions.         DX-CHEST-PORTABLE (1 VIEW)   Final Result      Possible slight increase in hazy opacities in left lung base which could represent atelectasis and/or pneumonitis.      Otherwise no significant interval change.         DX-ABDOMEN FOR TUBE PLACEMENT   Final Result      Enteric tube has been placed and the tip projects over the stomach.      DX-CHEST-PORTABLE (1 VIEW)   Final Result      Removal of endotracheal and Dobbhoff tubes.      No other significant interval change.      DX-CHEST-PORTABLE (1 VIEW)   Final Result         1.  Interstitial pulmonary parenchymal prominence suggest chronic underlying lung disease, component of interstitial edema and/or infiltrates not excluded. Stable since prior study   2.  Small bilateral pleural effusions, stable since prior study   3.  Hyperexpansion of lungs favors changes of COPD.   4.  Atherosclerosis                  DX-CHEST-PORTABLE (1 VIEW)   Final Result         1.  Interstitial pulmonary parenchymal prominence suggest chronic underlying lung disease, component of interstitial edema and/or infiltrates not excluded. Stable since prior study   2.  Small bilateral pleural effusions, stable since prior study   3.  Hyperexpansion of lungs favors changes of COPD.   4.  Atherosclerosis               DX-CHEST-PORTABLE (1 VIEW)   Final Result         1.  Interstitial pulmonary parenchymal prominence suggest chronic underlying lung disease, component of interstitial edema and/or infiltrates not excluded.  Stable since prior study   2.  Small bilateral pleural effusions, stable since prior study   3.  Hyperexpansion of lungs favors changes of COPD.   4.  Atherosclerosis            DX-CHEST-PORTABLE (1 VIEW)   Final Result         1.  Interstitial pulmonary parenchymal prominence suggest chronic underlying lung disease, component of interstitial edema and/or infiltrates not excluded. Stable since prior study   2.  Small bilateral pleural effusions, stable since prior study   3.  Hyperexpansion of lungs favors changes of COPD.   4.  Atherosclerosis         DX-CHEST-PORTABLE (1 VIEW)   Final Result         1.  Interstitial pulmonary parenchymal prominence suggest chronic underlying lung disease, component of interstitial edema and/or infiltrates not excluded. Stable since prior study   2.  Small bilateral pleural effusions, decreased on the right since prior study   3.  Hyperexpansion of lungs favors changes of COPD.   4.  Atherosclerosis      DX-ABDOMEN FOR TUBE PLACEMENT   Final Result      Feeding tube tip at the distal stomach.      DX-CHEST-LIMITED (1 VIEW)   Final Result         1.  Interstitial pulmonary parenchymal prominence suggest chronic underlying lung disease, component of interstitial edema and/or infiltrates not excluded. Stable since prior study   2.  Left upper lobe nodular density, see dedicated CT performed August 2, 2020 for further characterization.   3.  Small bilateral pleural effusions   4.  Hyperexpansion of lungs favors changes of COPD.   5.  Atherosclerosis   6.  Right internal jugular central line is seen, there is no new central line appreciated since prior study.      DX-ABDOMEN FOR TUBE PLACEMENT   Final Result         1.  Nonspecific bowel gas pattern.   2.  Nasogastric tube tip terminates overlying the expected location of the pylorus or first duodenal segment.      DX-CHEST-PORTABLE (1 VIEW)   Final Result         1.  Interstitial pulmonary parenchymal prominence suggest chronic  underlying lung disease, component of interstitial edema and/or infiltrates not excluded.   2.  Left upper lobe nodular density, see dedicated CT performed today for further characterization.   3.  Small bilateral pleural effusions   4.  Hyperexpansion of lungs favors changes of COPD.   5.  Atherosclerosis      EC-ECHOCARDIOGRAM COMPLETE W/O CONT   Final Result      CT-CHEST (THORAX) WITH   Final Result      1.  Diffuse bullous emphysematous changes of the chest with 3 areas of spiculation, 2 in the left upper lobe, and one in the right lower lobe.      2.  Differential diagnosis includes lung carcinoma versus acute and chronic inflammatory changes associated with bullous disease.      3.  Comparison with prior imaging, if available, is recommended. Alternatively PET/CT or biopsy may be of value to exclude lung carcinoma.      4.  No thoracic adenopathy.      5.  Bibasilar areas of atelectasis or pneumonia.      6.  No significant pleural effusion.      7.  No evidence of acute hemorrhage identified.      Fleischner Society pulmonary nodule recommendations:         CT-HEAD W/O   Final Result      1.  No acute intracranial process.      2.  Atrophy and small vessel ischemic change.      CL-LEFT HEART CATHETERIZATION WITH POSSIBLE INTERVENTION    (Results Pending)   US-EXTREMITY ARTERY LOWER BILAT    (Results Pending)         Assessment and plan    * Acute on chronic systolic heart failure (HCC)- (present on admission)  Assessment & Plan  - ischemic cardiomyopathy, EF 35%, left heart cath with KEYLA to RCA for STEMI, cardiac arrest  Plan  - ASA, Plavix, statin, will start BB, ACEi, Aldactone as able, holding for current need for pressors  - Lasix discontinued for hypernatremia    Coronary artery disease due to calcified coronary lesion- (present on admission)  Assessment & Plan  - multivessel disease - 100% RCA occlusion, 100% LCx occlusion, left main 70% stenosis, LAD 30%  - S/P KEYLA to RCA, possible reperfusion injury  manifested by PEA, attained ROSC  Plan  - Cardiology following  - ASA, Plavix, statin, Metoprolol  - to start ACEi as able    Cardiac arrest (HCC)- (present on admission)  Assessment & Plan  - underlying ischemic heart disease, multivessel disease, initially received tenecteplase at outside facility, had Vfib, attained ROSC with 1 round of ACLS  - subsequently had PEA after cath at [facility] 8/3, attained ROSC with Epi and one round of ACLS  Plan  - Amiodarone to continue per Cardiology  - watch for post cath/ MI complications with continuous telemetry monitoring    Acute ST elevation myocardial infarction (STEMI) due to occlusion of right coronary artery (HCC)- (present on admission)  Assessment & Plan  - RCA STEMI < 100% occlusion on cath on admission  - S/P KEYLA to RCA, EF 35%  - currently on pressor support but off pressors from 8/7  Plan  - continue ASA, Plavix, statin per Cortrak  - metoprolol 25 bid  - Cardiology following, appreciate recs    PAF (paroxysmal atrial fibrillation) ([facility])  Assessment & Plan  - likely from increased arrhythmogenic myocardium post STEMI, cardiac arrest  - PRN Metoprolol IV for RVR  - scheduled PO Metoprolol  - CHADSVAS 3, on Eliquis    COPD (chronic obstructive pulmonary disease) ([facility])- (present on admission)  Assessment & Plan  - underlying COPD, unclear if he is on supplemental O2 support at home  - extubated successfully on 8/8 initially to BiPAP, currently saturating well on Oxymask at 40%  - Symbicort, Duonebs, RT per protocol    Leukocytosis- (present on admission)  Assessment & Plan  - likely stress reaction, patient also hypothermic but this could be from hypoperfusion from cardiac arrest  - trending down to normal range, now normothermic  - finished unasyn course for aspiration PNA 8/10  -now uptrending again 8/12  Plan  - started Cefepime for HAP    Ischemic cardiomyopathy (HCC)- (present on admission)  Assessment & Plan  - EF 35% at the time of cardiac catheterization  during admission  - currently on ASA, Plavix, statin, Metoprolol    History of lung cancer  Assessment & Plan  - in 2005, S/P chemo, recent hemoptysis and friable mass on bronch raising suspicion for recurrence  - repeat bronchoscopy 8/4 with no evidence of endobronchial lesion/ mass, evident only old blood lavaged and sent for studies  - unlikely recurrence of lung cancer  - path from bronchoscopic tissue sample benign    Encephalopathy acute- (present on admission)  Assessment & Plan  - likely toxic, metabolic, possibly hypoxic/ anoxic during Vfib/ PEA but he was alert and able to communicate this AM  - resolved, alert, oriented, able to follow commands    Metabolic acidosis- (present on admission)  Assessment & Plan  resolved    Hyperglycemia- (present on admission)  Assessment & Plan  - BG<200, SSI      DISPO: ICU    Code Status: DNR    Quality Measures:  Cesar Catheter: yes  DVT Prophylaxis: Eliquis  Ulcer Prophylaxis: Pepcid  Antibiotics: cefepime  Lines: PIV lines

## 2020-08-12 NOTE — CONSULTS
"Reason for PC Consult: Advance Care Planning    Consulted by: Dr. Fay    Assessment:  General:   74 yr old male with PMHx of DLD, hypothyroidism, COPD, lung cancer S/P chemotherapy in 2005, presented 8/2/20 as a transfer from [facility]. Found to have an inferior STEMI, received tenecteplase, following which he had an episode of Vfib needing defibrillation and 1 round of ACLS. 8/3 pt had another PEA arrest, pt remained on ventilator. Pt was eventually liberated from vent, has required HFNC since. Pt currently confused and restrained in CIC.     Dyspnea: Yes   Last BM: 08/12/20  Pain: Yes  Depression: Unable to determine  Dementia: No    Spiritual:  Is Pentecostal or spirituality important for coping with this illness? No  Has a  or spiritual provider visit been requested? No    Palliative Performance Scale: 30%    Advance Directive: None  DPOA: No  POLST: No    Code Status: DNR/DNI- changed to comfort measures    Social: Pt lives at home with his wife Lexx in [place]. Pt has three children, Moe, Lorri, and Ole.     Outcome:  Along with BS LIZZY De Leon, introduced self and role of Palliative Care to pt's wife Lexx in the conference room. Pt's children, Moe, Lorri, and Ole were included via speaker phone.  Assessed family's understanding of current medical status, overall health picture, and options for future care. Wife summarizes the events of this hospitalization including pt's heart attack, intubation, and what she perceives as a \"steady decline\" since. Lexx reports that pt has been on oral chemo medications for 16 years. He has dealt with health issues, but largely remained independent. When this event occurred Sukhi and Lexx were camping in [place], [place].     Dr. Fay and Dr. Sy joined conversation. MD provided detailed medical update. Following this information, team explored pt's values, beliefs, and preferences in order to identify GOC. According to Lexx the pt's priority is quality of life. " PC RN explains comfort focused care vs continuing supportive care and treatment. After thorough discussion among all family members and team, family all agree that comfort focused care aligns with pt's wishes. Spiritual visit declined. BS RN will assist family is setting up video conference call in order to say goodbye.     Active listening, reflection, reminiscing, validation & normalization, and empathic support utilized throughout this encounter.  All questions answered.  PC contact information given.         Updated: all team members present.    Plan: comfort focused care. PC RN will follow and support.       Thank you for allowing Palliative Care to participate in this patient's care. Please feel free to call [phone] with any questions or concerns.

## 2020-08-12 NOTE — PROGRESS NOTES
This RN participated in hot rounds. Dr. Sy and Dr. Fay notified that this RN is not able to obtain a doppler pulse on patient's left pedal or post tibial pulse; a doppler popliteal pulse can be obtained. A doppler right pedal pulse can be heard. No new orders received. No need, per Dr. Fay, to update MD on patient's pulses. MD also notified that patient's abdomen is more distended and more firm compared to this RN's assessment yesterday. No new orders received.

## 2020-08-12 NOTE — PROGRESS NOTES
Patient had a bowel movement that was bloody. Dr. Fay notified. No new orders received; family conference planned when wife arrives to discuss plan of care.

## 2020-08-12 NOTE — PROGRESS NOTES
Care conference was held with wife, 3 adult children (over the phone), Dr. Sy, Dr. Fay, Pallative Care RN, Daniella, and this RN. After discussion, family has made the decision to make the patient comfort care. A zoom meeting has been scheduled for 1645. Mitts removed from patient's hands.

## 2020-08-12 NOTE — PROGRESS NOTES
Dr. Fay notified that when patient's skin was assessed on his coccyx he had pinpoint open spots that were bleeding at time of assessment. Area was cleansed and covered with a mepilex.     Patient was turned and Dr. Fay assessed skin on patient's coccyx. Area is no longer bleeding, but it is red and raw; old blood noted on mepilex. Skin was cleaned and a new mepilex placed.     This RN received a call from patient's wife requesting an update. She was notified that patient's WBC has increased to 30.3 today and that his pulses are harder to find in his feet. She was also informed that Pallative Care has been contacted and will come see her and her  when she arrives, if the time allows. She was thankful for the care that the hospital is providing to her .

## 2020-08-13 NOTE — ASSESSMENT & PLAN NOTE
- intubated initially, extubated successfully to BiPAP and NFNC then to NC/ Oxymask O2 support  - made comfort care on 8/12, passed away from cardiac arrest secondary to acute hypoxia on 8/13

## 2020-08-13 NOTE — ASSESSMENT & PLAN NOTE
Monophasic flow distal to the knees  Skin tissue changes on the toes  LIT <0.5 bilaterally  Continue statin and antiplatelet therapy

## 2020-08-13 NOTE — PROGRESS NOTES
Patient says that he is not ready to have his oxygen/cortrak/molina catheter removed. He also declines antianxiety medications or pain medications. His wife would like him to have his beer before oxygen is removed.

## 2020-08-13 NOTE — DISCHARGE SUMMARY
Internal Medicine Death Summary  Note Author: Igor Aldana M.D.       Admit Date:  8/2/2020       Date of Death:   8/13/2020    Service:   [facility] Internal Medicine Gold Team  Attending Physician(s):   Poncho Sy D.O.       Senior Resident(s):   Igor Aldana M.D.      Cause of Death:   Cardiac arrest secondary to acute hypoxic respiratory failure    Diagnoses:            Principal Problem:    Acute on chronic systolic heart failure (HCC) POA: Yes  Active Problems:    Acute ST elevation myocardial infarction (STEMI) due to occlusion of right coronary artery (HCC) POA: Yes    Cardiac arrest (HCC) POA: Yes    Coronary artery disease due to calcified coronary lesion POA: Yes      Overview: Left main 70%,  prox LCX    History of lung cancer POA: No      Overview: 2005, s/p chemo RX    Ischemic cardiomyopathy (HCC) POA: Yes    Leukocytosis POA: Yes    COPD (chronic obstructive pulmonary disease) (HCC) POA: Yes    PAF (paroxysmal atrial fibrillation) (HCC) POA: No      Overview: 8/5/2020 probably a few hours per RN, converted after IV metoprolol    Acute respiratory failure with hypoxia (HCC) POA: Yes    Hypernatremia POA: Yes    Pneumonia POA: Yes    PAD (peripheral artery disease) (HCC) POA: Yes    Hyperglycemia POA: Yes    Metabolic acidosis POA: Yes    Encephalopathy acute POA: Yes  Resolved Problems:    Ventricular fibrillation (HCC) POA: Yes    Hemoptysis POA: Yes      Hospital Summary (Brief Narrative):       Mr Howard was a 74 year old male with past medical history of dyslipidemia, hypothyroidism, COPD, lung cancer status post chemotherapy in 2005, who presented as a transfer from [facility] on 8/2/2027 with retrosternal chest pain and found to have an inferior STEMI, received tenecteplase, following which she had an episode of V. fib needing defibrillation and 1 round of ACLS.  He was then intubated following which high-volume hemoptysis was noted necessitating TXA and Kcentra.  He was  started on amiodarone drip, fentanyl, obtunded on admission with GCS of 3.  He underwent a bronchoscopy on admission significant for right mainstem bronchus friable mass which was removed and sent to path, which was benign.  He was taken to the Cath Lab and underwent PCI with KEYLA to RCA 8/2/2020.  Bili on 8/3/2020 a.m. he had PEA and CODE BLUE was called with him during [place] after epi and 1 round of ACLS.    He was extubated 8/8/2020 and weaned down to high flow nasal cannula.  He was weaned off of pressor support.  He was found to have atrial fibrillation, with metoprolol being added for rate control.  He was started on apixaban in addition to aspirin, Plavix, metoprolol.  Lisinopril was added as well.  He was diuresed with Lasix with good urine output but was later noted to be hyponatremic and free water flushes were introduced with discontinuation of Lasix.    He was noted to be leukocytic with no clear focus of infection beyond that anticipated by him receiving stress dose steroids.  He was started on cefepime on 8/12.    Of note, he was significantly encephalopathic, likely related to anoxia sustained during his V. fib and PEA events.  Palliative care was consulted and along with discussion with his family and the intensive care team, his management will switch to comfort care measures only.    On 8/14/2020 at approximately 7:10 AM, he passed away of cardiac arrest secondary to hypoxic respiratory failure.          Patient /Hospital Summary (Details -- Problem Oriented) :          Coronary artery disease due to calcified coronary lesion  Assessment & Plan  - multivessel disease - 100% RCA occlusion, 100% LCx occlusion, left main 70% stenosis, LAD 30%  - S/P KEYLA to RCA, possible reperfusion injury manifested by PEA, attained ROSC  Plan  - Cardiology following  - ASA, Plavix, statin, Metoprolol      Cardiac arrest (HCC)  Assessment & Plan  - underlying ischemic heart disease, multivessel disease, initially  received tenecteplase at outside facility, had Vfib, attained ROSC with 1 round of ACLS  - subsequently had PEA after cath at [facility] 8/3, attained ROSC with Epi and one round of ACLS  Plan  - Amiodarone to continue per Cardiology  - watch for post cath/ MI complications with continuous telemetry monitoring    Acute ST elevation myocardial infarction (STEMI) due to occlusion of right coronary artery (HCC)  Assessment & Plan  - RCA STEMI < 100% occlusion on cath on admission  - S/P KEYLA to RCA, EF 35%  - currently on pressor support but off pressors from 8/7  Plan  - continue ASA, Plavix, statin per [facility]  - metoprolol 25 bid    * Acute on chronic systolic heart failure (HCC)  Assessment & Plan  - ischemic cardiomyopathy, EF 35%, left heart cath with KEYLA to RCA for STEMI, cardiac arrest  Plan  - ASA, Plavix, statin, will start BB, ACEi, Aldactone as able, holding for current need for pressors  - Lasix discontinued for hypernatremia    Hemoptysis-resolved as of 8/6/2020  Assessment & Plan  - large volume hemoptysis after intubation and tenecteplase  - finding on bronchoscopy of friable left mainstem bronchus mass, sent to path  - hx of lung cancer, S/P chemo in 2005  - repeat bronchoscopy 8/4, evident old blood but no mucosal lesion, likely primary hemoptysis in setting of traumatic intubation and tenecteplase as opposed to intrabronchial lesion  Plan  - await path on mass, BAL fluid studies  - transfuse to keep Hb>7    Ventricular fibrillation (HCC)-resolved as of 8/6/2020  Assessment & Plan  - Vfib sustained when patient was being worked up for STEMI, likely from arrhythmogenic myocardium when hypoxic  Plan  - discontinue Amiodarone    PAF (paroxysmal atrial fibrillation) (HCC)  Assessment & Plan  - likely from increased arrhythmogenic myocardium post STEMI, cardiac arrest  - PRN Metoprolol IV for RVR  - scheduled PO Metoprolol  - CHADSVASC 3, on Eliquis    COPD (chronic obstructive pulmonary disease)  (HCC)  Assessment & Plan  - underlying COPD, unclear if he is on supplemental O2 support at home  - extubated successfully on 8/8 initially to BiPAP, currently saturating well on Oxymask at 40%  - Symbicort, Duonebs, RT per protocol    Leukocytosis  Assessment & Plan  - likely stress reaction, patient also hypothermic but this could be from hypoperfusion from cardiac arrest  - trending down to normal range, now normothermic  - finished unasyn course for aspiration PNA 8/10  -now uptrending again 8/12  Plan  - started Cefepime for HAP    Ischemic cardiomyopathy (HCC)  Assessment & Plan  - EF 35% at the time of cardiac catheterization during admission  - currently on ASA, Plavix, statin, Metoprolol    History of lung cancer  Assessment & Plan  - in 2005, S/P chemo, recent hemoptysis and friable mass on bronch raising suspicion for recurrence  - repeat bronchoscopy 8/4 with no evidence of endobronchial lesion/ mass, evident only old blood lavaged and sent for studies  - unlikely recurrence of lung cancer  - path from bronchoscopic tissue sample benign    Hypernatremia  Assessment & Plan  - secondary to likely over-diuresis    Acute respiratory failure with hypoxia (HCC)  Assessment & Plan  - intubated initially, extubated successfully to BiPAP and NFNC then to NC/ Oxymask O2 support  - made comfort care on 8/12, passed away from cardiac arrest secondary to acute hypoxia on 8/13    Encephalopathy acute  Assessment & Plan  - likely toxic, metabolic, possibly hypoxic/ anoxic during Vfib/ PEA but he was alert and able to communicate this AM  - resolved, alert, oriented, able to follow commands    Metabolic acidosis  Assessment & Plan  resolved    Hyperglycemia  Assessment & Plan  - BG<200, SSI      Consultants:     Cardiology    Procedures:        8/2/2020 cardiac catheterization  8/2/2020 bronchoscopy with BAL  8/3/2020 arterial line  8/3/2020 right internal jugular CVC  8/3/2020 right arterial line  8/4/2020 bronchoscopy  with BAL  8/10/2020 LUE midline    Imaging/ Testing:      US-EXTREMITY ARTERY LOWER BILAT   Final Result      DX-CHEST-LIMITED (1 VIEW)   Final Result      1.  Stable bilateral pulmonary opacities, with largest focal opacity located in the left lung base.   2.  Stable small bilateral pleural effusions.      US-LIT SINGLE LEVEL BILAT   Final Result      DX-ABDOMEN FOR TUBE PLACEMENT   Final Result      Enteric tube tip projects over the stomach.      IR-MIDLINE CATHETER INSERTION WO GUIDANCE > AGE 3   Final Result                  Ultrasound-guided midline placement performed by qualified nursing staff    as above.          DX-ABDOMEN FOR TUBE PLACEMENT   Final Result      Enteric tube projects over the stomach.      Distended loops of bowel throughout the abdomen may represent ileus.         DX-CHEST-FOR LINE PLACEMENT Perform procedure in: Patient's Room   Final Result      Right central line has been removed. No pneumothorax is seen.      Bilateral airspace opacities likely representing multifocal pneumonia.      Multifocal areas of parenchymal scarring are again noted. Underlying emphysematous changes.      Likely small bilateral pleural effusions.         DX-ABDOMEN FOR TUBE PLACEMENT   Final Result      Enteric tube has been placed and the tip projects over the stomach.      DX-ABDOMEN FOR TUBE PLACEMENT   Final Result      Feeding tube tip at the distal stomach.      DX-CHEST-LIMITED (1 VIEW)   Final Result         1.  Interstitial pulmonary parenchymal prominence suggest chronic underlying lung disease, component of interstitial edema and/or infiltrates not excluded. Stable since prior study   2.  Left upper lobe nodular density, see dedicated CT performed August 2, 2020 for further characterization.   3.  Small bilateral pleural effusions   4.  Hyperexpansion of lungs favors changes of COPD.   5.  Atherosclerosis   6.  Right internal jugular central line is seen, there is no new central line appreciated  since prior study.      DX-ABDOMEN FOR TUBE PLACEMENT   Final Result         1.  Nonspecific bowel gas pattern.   2.  Nasogastric tube tip terminates overlying the expected location of the pylorus or first duodenal segment.      DX-CHEST-PORTABLE (1 VIEW)   Final Result         1.  Interstitial pulmonary parenchymal prominence suggest chronic underlying lung disease, component of interstitial edema and/or infiltrates not excluded.   2.  Left upper lobe nodular density, see dedicated CT performed today for further characterization.   3.  Small bilateral pleural effusions   4.  Hyperexpansion of lungs favors changes of COPD.   5.  Atherosclerosis      EC-ECHOCARDIOGRAM COMPLETE W/O CONT   Final Result      CT-CHEST (THORAX) WITH   Final Result      1.  Diffuse bullous emphysematous changes of the chest with 3 areas of spiculation, 2 in the left upper lobe, and one in the right lower lobe.      2.  Differential diagnosis includes lung carcinoma versus acute and chronic inflammatory changes associated with bullous disease.      3.  Comparison with prior imaging, if available, is recommended. Alternatively PET/CT or biopsy may be of value to exclude lung carcinoma.      4.  No thoracic adenopathy.      5.  Bibasilar areas of atelectasis or pneumonia.      6.  No significant pleural effusion.      7.  No evidence of acute hemorrhage identified.      Fleischner Society pulmonary nodule recommendations:         CT-HEAD W/O   Final Result      1.  No acute intracranial process.      2.  Atrophy and small vessel ischemic change.      CL-LEFT HEART CATHETERIZATION WITH POSSIBLE INTERVENTION    (Results Pending)

## 2020-08-13 NOTE — PROGRESS NOTES
Patient declined beer.    Per wife's request, patient was taken off of high flow nasal cannula and placed on 2 liters nasal cannula for patient's comfort. Saral instructed to call RN if patient appears to be anxious or in pain.

## 2020-08-13 NOTE — THERAPY
Pt has transitioned to comfort care. PT will sign off at this time. Please reorder should pt's status change. Thanks    Mame Rehman, PT, DPT Phone: [phone]

## 2020-08-13 NOTE — CARE PLAN
Problem: Safety  Goal: Free from accidental injury  Description: PT remained free from accidental injury  Outcome: PROGRESSING AS EXPECTED  Note: Pt remained free from accidental injury  Intervention: Initiate Safety Measures  Note: Safety precautions implemented   Intervention: Assess for Fall Risk  Note: Fall risk assessed and precautions implemented   Intervention: Initiate Fall Precautions  Note: Fall risks precautions initiated     Problem: Pain  Goal: Alleviation of Pain or a reduction in pain to the patient's comfort goal  Description: Pt reported no pain through out shitf, refused medication  Outcome: PROGRESSING AS EXPECTED  Intervention: Pain Management--Medications  Note: Pt reported no significant pain, denies medication  Intervention: Pain Management--Non Pharmacologic techniques. Examples: Relaxation, Distraction, Play Therapy, Activity Therapy, Massage  Flowsheets (Taken 8/13/2020 0400)  Intervention: (Pt declines morphine)   Repositioned   Declines  Note: Pt declined pain medication.

## 2020-08-13 NOTE — PROGRESS NOTES
Patient pronounced  at 707.  called - patient is not a case and all lines can be removed. DONOR called - patient is not a candidate for organ donation. Belongings sent with wife. Wife states she is unsure what  home/cremetorium patient will be sent to, but she will contact once she knows. Expiration paperwork completed. Patient placed in body bag and traction called. Awaiting pickup at this time.

## 2020-08-13 NOTE — PROGRESS NOTES
During care conference family requested that patient receive a beer once he is comfort care. Beer ordered.

## 2020-08-13 NOTE — THERAPY
Missed Therapy     Patient Name: Sukhi Howard  Age:  74 y.o., Sex:  male  Medical Record #: [id number]  Today's Date: 8/13/2020 08/13/20 0801   Interdisciplinary Plan of Care Collaboration   Collaboration Comments Pts status has changed to comfort care at this time. OT will be available for d/c needs if appropriate.

## 2020-08-17 LAB
BACTERIA BLD CULT: NORMAL
BACTERIA BLD CULT: NORMAL
SIGNIFICANT IND 70042: NORMAL
SIGNIFICANT IND 70042: NORMAL
SITE SITE: NORMAL
SITE SITE: NORMAL
SOURCE SOURCE: NORMAL
SOURCE SOURCE: NORMAL

## 2020-08-31 LAB
FUNGUS SPEC CULT: NORMAL
FUNGUS SPEC CULT: NORMAL
SIGNIFICANT IND 70042: NORMAL
SIGNIFICANT IND 70042: NORMAL
SITE SITE: NORMAL
SITE SITE: NORMAL
SOURCE SOURCE: NORMAL
SOURCE SOURCE: NORMAL
